# Patient Record
Sex: FEMALE | Race: WHITE | NOT HISPANIC OR LATINO | Employment: OTHER | URBAN - METROPOLITAN AREA
[De-identification: names, ages, dates, MRNs, and addresses within clinical notes are randomized per-mention and may not be internally consistent; named-entity substitution may affect disease eponyms.]

---

## 2019-05-02 ENCOUNTER — TRANSCRIBE ORDERS (OUTPATIENT)
Dept: MRI IMAGING | Facility: CLINIC | Age: 64
End: 2019-05-02

## 2019-05-02 ENCOUNTER — APPOINTMENT (OUTPATIENT)
Dept: RADIOLOGY | Facility: CLINIC | Age: 64
End: 2019-05-02
Payer: COMMERCIAL

## 2019-05-02 DIAGNOSIS — M54.2 NECKACHE: ICD-10-CM

## 2019-05-02 DIAGNOSIS — M54.2 NECKACHE: Primary | ICD-10-CM

## 2019-05-02 PROCEDURE — 72050 X-RAY EXAM NECK SPINE 4/5VWS: CPT

## 2019-07-02 ENCOUNTER — APPOINTMENT (OUTPATIENT)
Dept: LAB | Facility: HOSPITAL | Age: 64
End: 2019-07-02
Payer: COMMERCIAL

## 2019-07-02 ENCOUNTER — TRANSCRIBE ORDERS (OUTPATIENT)
Dept: ADMINISTRATIVE | Facility: HOSPITAL | Age: 64
End: 2019-07-02

## 2019-07-02 DIAGNOSIS — I10 ESSENTIAL HYPERTENSION, MALIGNANT: Primary | ICD-10-CM

## 2019-07-02 DIAGNOSIS — I10 ESSENTIAL HYPERTENSION, MALIGNANT: ICD-10-CM

## 2019-07-02 LAB
25(OH)D3 SERPL-MCNC: 18 NG/ML (ref 30–100)
ALBUMIN SERPL BCP-MCNC: 3.5 G/DL (ref 3.5–5)
ALP SERPL-CCNC: 96 U/L (ref 46–116)
ALT SERPL W P-5'-P-CCNC: 26 U/L (ref 12–78)
ANION GAP SERPL CALCULATED.3IONS-SCNC: 8 MMOL/L (ref 4–13)
AST SERPL W P-5'-P-CCNC: 14 U/L (ref 5–45)
BASOPHILS # BLD AUTO: 0.09 THOUSANDS/ΜL (ref 0–0.1)
BASOPHILS NFR BLD AUTO: 1 % (ref 0–1)
BILIRUB DIRECT SERPL-MCNC: 0.07 MG/DL (ref 0–0.2)
BILIRUB SERPL-MCNC: 0.3 MG/DL (ref 0.2–1)
BUN SERPL-MCNC: 13 MG/DL (ref 5–25)
CALCIUM SERPL-MCNC: 8.6 MG/DL (ref 8.3–10.1)
CHLORIDE SERPL-SCNC: 106 MMOL/L (ref 100–108)
CHOLEST SERPL-MCNC: 152 MG/DL (ref 50–200)
CO2 SERPL-SCNC: 28 MMOL/L (ref 21–32)
CREAT SERPL-MCNC: 0.72 MG/DL (ref 0.6–1.3)
EOSINOPHIL # BLD AUTO: 0.5 THOUSAND/ΜL (ref 0–0.61)
EOSINOPHIL NFR BLD AUTO: 5 % (ref 0–6)
ERYTHROCYTE [DISTWIDTH] IN BLOOD BY AUTOMATED COUNT: 12.6 % (ref 11.6–15.1)
EST. AVERAGE GLUCOSE BLD GHB EST-MCNC: 128 MG/DL
GFR SERPL CREATININE-BSD FRML MDRD: 89 ML/MIN/1.73SQ M
GLUCOSE SERPL-MCNC: 101 MG/DL (ref 65–140)
HBA1C MFR BLD: 6.1 % (ref 4.2–6.3)
HCT VFR BLD AUTO: 39.7 % (ref 34.8–46.1)
HDLC SERPL-MCNC: 36 MG/DL (ref 40–60)
HGB BLD-MCNC: 13.5 G/DL (ref 11.5–15.4)
IMM GRANULOCYTES # BLD AUTO: 0.12 THOUSAND/UL (ref 0–0.2)
IMM GRANULOCYTES NFR BLD AUTO: 1 % (ref 0–2)
LDLC SERPL CALC-MCNC: 55 MG/DL (ref 0–100)
LYMPHOCYTES # BLD AUTO: 2.84 THOUSANDS/ΜL (ref 0.6–4.47)
LYMPHOCYTES NFR BLD AUTO: 26 % (ref 14–44)
MCH RBC QN AUTO: 30.3 PG (ref 26.8–34.3)
MCHC RBC AUTO-ENTMCNC: 34 G/DL (ref 31.4–37.4)
MCV RBC AUTO: 89 FL (ref 82–98)
MONOCYTES # BLD AUTO: 0.88 THOUSAND/ΜL (ref 0.17–1.22)
MONOCYTES NFR BLD AUTO: 8 % (ref 4–12)
NEUTROPHILS # BLD AUTO: 6.58 THOUSANDS/ΜL (ref 1.85–7.62)
NEUTS SEG NFR BLD AUTO: 59 % (ref 43–75)
NONHDLC SERPL-MCNC: 116 MG/DL
NRBC BLD AUTO-RTO: 0 /100 WBCS
PLATELET # BLD AUTO: 218 THOUSANDS/UL (ref 149–390)
PMV BLD AUTO: 10.9 FL (ref 8.9–12.7)
POTASSIUM SERPL-SCNC: 4.2 MMOL/L (ref 3.5–5.3)
PROT SERPL-MCNC: 6.9 G/DL (ref 6.4–8.2)
RBC # BLD AUTO: 4.45 MILLION/UL (ref 3.81–5.12)
SODIUM SERPL-SCNC: 142 MMOL/L (ref 136–145)
TRIGL SERPL-MCNC: 306 MG/DL
WBC # BLD AUTO: 11.01 THOUSAND/UL (ref 4.31–10.16)

## 2019-07-02 PROCEDURE — 80061 LIPID PANEL: CPT

## 2019-07-02 PROCEDURE — 80048 BASIC METABOLIC PNL TOTAL CA: CPT

## 2019-07-02 PROCEDURE — 82306 VITAMIN D 25 HYDROXY: CPT

## 2019-07-02 PROCEDURE — 83036 HEMOGLOBIN GLYCOSYLATED A1C: CPT

## 2019-07-02 PROCEDURE — 80076 HEPATIC FUNCTION PANEL: CPT

## 2019-07-02 PROCEDURE — 85025 COMPLETE CBC W/AUTO DIFF WBC: CPT

## 2019-07-02 PROCEDURE — 36415 COLL VENOUS BLD VENIPUNCTURE: CPT

## 2019-10-09 ENCOUNTER — APPOINTMENT (OUTPATIENT)
Dept: LAB | Facility: CLINIC | Age: 64
End: 2019-10-09
Payer: COMMERCIAL

## 2019-10-09 ENCOUNTER — TRANSCRIBE ORDERS (OUTPATIENT)
Dept: LAB | Facility: CLINIC | Age: 64
End: 2019-10-09

## 2019-10-09 DIAGNOSIS — M79.642 LEFT HAND PAIN: Primary | ICD-10-CM

## 2019-10-09 DIAGNOSIS — M79.642 LEFT HAND PAIN: ICD-10-CM

## 2019-10-09 LAB
CRP SERPL QL: 10.2 MG/L
ERYTHROCYTE [DISTWIDTH] IN BLOOD BY AUTOMATED COUNT: 13.1 % (ref 11.6–15.1)
ERYTHROCYTE [SEDIMENTATION RATE] IN BLOOD: 38 MM/HOUR (ref 0–20)
HCT VFR BLD AUTO: 40 % (ref 34.8–46.1)
HGB BLD-MCNC: 13.1 G/DL (ref 11.5–15.4)
MCH RBC QN AUTO: 29.4 PG (ref 26.8–34.3)
MCHC RBC AUTO-ENTMCNC: 32.8 G/DL (ref 31.4–37.4)
MCV RBC AUTO: 90 FL (ref 82–98)
PLATELET # BLD AUTO: 228 THOUSANDS/UL (ref 149–390)
PMV BLD AUTO: 11 FL (ref 8.9–12.7)
RBC # BLD AUTO: 4.45 MILLION/UL (ref 3.81–5.12)
URATE SERPL-MCNC: 5.3 MG/DL (ref 2–6.8)
WBC # BLD AUTO: 10.69 THOUSAND/UL (ref 4.31–10.16)

## 2019-10-09 PROCEDURE — 86038 ANTINUCLEAR ANTIBODIES: CPT

## 2019-10-09 PROCEDURE — 86140 C-REACTIVE PROTEIN: CPT

## 2019-10-09 PROCEDURE — 36415 COLL VENOUS BLD VENIPUNCTURE: CPT | Performed by: ORTHOPAEDIC SURGERY

## 2019-10-09 PROCEDURE — 85652 RBC SED RATE AUTOMATED: CPT | Performed by: ORTHOPAEDIC SURGERY

## 2019-10-09 PROCEDURE — 84550 ASSAY OF BLOOD/URIC ACID: CPT

## 2019-10-09 PROCEDURE — 86618 LYME DISEASE ANTIBODY: CPT

## 2019-10-09 PROCEDURE — 85027 COMPLETE CBC AUTOMATED: CPT

## 2019-10-09 PROCEDURE — 86430 RHEUMATOID FACTOR TEST QUAL: CPT

## 2019-10-10 LAB
B BURGDOR IGG+IGM SER-ACNC: <0.91 ISR (ref 0–0.9)
RHEUMATOID FACT SER QL LA: NEGATIVE

## 2019-10-11 LAB — RYE IGE QN: NEGATIVE

## 2020-09-28 ENCOUNTER — TRANSCRIBE ORDERS (OUTPATIENT)
Dept: LAB | Facility: CLINIC | Age: 65
End: 2020-09-28

## 2020-09-28 ENCOUNTER — APPOINTMENT (OUTPATIENT)
Dept: LAB | Facility: CLINIC | Age: 65
End: 2020-09-28
Payer: MEDICARE

## 2020-09-28 DIAGNOSIS — R73.03 DIABETES MELLITUS, LATENT: ICD-10-CM

## 2020-09-28 DIAGNOSIS — Z83.430 FAMILY HISTORY OF ELEVATED LIPOPROTEIN (A): ICD-10-CM

## 2020-09-28 DIAGNOSIS — R73.03 DIABETES MELLITUS, LATENT: Primary | ICD-10-CM

## 2020-09-28 LAB
ALBUMIN SERPL BCP-MCNC: 3.5 G/DL (ref 3.5–5)
ALP SERPL-CCNC: 83 U/L (ref 46–116)
ALT SERPL W P-5'-P-CCNC: 24 U/L (ref 12–78)
ANION GAP SERPL CALCULATED.3IONS-SCNC: 7 MMOL/L (ref 4–13)
AST SERPL W P-5'-P-CCNC: 13 U/L (ref 5–45)
BASOPHILS # BLD AUTO: 0.15 THOUSANDS/ΜL (ref 0–0.1)
BASOPHILS NFR BLD AUTO: 1 % (ref 0–1)
BILIRUB SERPL-MCNC: 0.42 MG/DL (ref 0.2–1)
BUN SERPL-MCNC: 17 MG/DL (ref 5–25)
CALCIUM SERPL-MCNC: 9.1 MG/DL (ref 8.3–10.1)
CHLORIDE SERPL-SCNC: 109 MMOL/L (ref 100–108)
CHOLEST SERPL-MCNC: 105 MG/DL (ref 50–200)
CO2 SERPL-SCNC: 28 MMOL/L (ref 21–32)
CREAT SERPL-MCNC: 0.71 MG/DL (ref 0.6–1.3)
CREAT UR-MCNC: 213 MG/DL
EOSINOPHIL # BLD AUTO: 1.42 THOUSAND/ΜL (ref 0–0.61)
EOSINOPHIL NFR BLD AUTO: 9 % (ref 0–6)
ERYTHROCYTE [DISTWIDTH] IN BLOOD BY AUTOMATED COUNT: 12.9 % (ref 11.6–15.1)
EST. AVERAGE GLUCOSE BLD GHB EST-MCNC: 128 MG/DL
GFR SERPL CREATININE-BSD FRML MDRD: 90 ML/MIN/1.73SQ M
GLUCOSE P FAST SERPL-MCNC: 104 MG/DL (ref 65–99)
HBA1C MFR BLD: 6.1 %
HCT VFR BLD AUTO: 39.5 % (ref 34.8–46.1)
HDLC SERPL-MCNC: 33 MG/DL
HGB BLD-MCNC: 13.4 G/DL (ref 11.5–15.4)
IMM GRANULOCYTES # BLD AUTO: 0.19 THOUSAND/UL (ref 0–0.2)
IMM GRANULOCYTES NFR BLD AUTO: 1 % (ref 0–2)
LDLC SERPL CALC-MCNC: 25 MG/DL (ref 0–100)
LYMPHOCYTES # BLD AUTO: 3.56 THOUSANDS/ΜL (ref 0.6–4.47)
LYMPHOCYTES NFR BLD AUTO: 22 % (ref 14–44)
MCH RBC QN AUTO: 29.9 PG (ref 26.8–34.3)
MCHC RBC AUTO-ENTMCNC: 33.9 G/DL (ref 31.4–37.4)
MCV RBC AUTO: 88 FL (ref 82–98)
MICROALBUMIN UR-MCNC: 37.8 MG/L (ref 0–20)
MICROALBUMIN/CREAT 24H UR: 18 MG/G CREATININE (ref 0–30)
MONOCYTES # BLD AUTO: 1.09 THOUSAND/ΜL (ref 0.17–1.22)
MONOCYTES NFR BLD AUTO: 7 % (ref 4–12)
NEUTROPHILS # BLD AUTO: 9.56 THOUSANDS/ΜL (ref 1.85–7.62)
NEUTS SEG NFR BLD AUTO: 60 % (ref 43–75)
NONHDLC SERPL-MCNC: 72 MG/DL
NRBC BLD AUTO-RTO: 0 /100 WBCS
PLATELET # BLD AUTO: 257 THOUSANDS/UL (ref 149–390)
PMV BLD AUTO: 10.2 FL (ref 8.9–12.7)
POTASSIUM SERPL-SCNC: 2.9 MMOL/L (ref 3.5–5.3)
PROT SERPL-MCNC: 7.3 G/DL (ref 6.4–8.2)
RBC # BLD AUTO: 4.48 MILLION/UL (ref 3.81–5.12)
SODIUM SERPL-SCNC: 144 MMOL/L (ref 136–145)
TRIGL SERPL-MCNC: 236 MG/DL
WBC # BLD AUTO: 15.97 THOUSAND/UL (ref 4.31–10.16)

## 2020-09-28 PROCEDURE — 82043 UR ALBUMIN QUANTITATIVE: CPT | Performed by: NURSE PRACTITIONER

## 2020-09-28 PROCEDURE — 82570 ASSAY OF URINE CREATININE: CPT | Performed by: NURSE PRACTITIONER

## 2020-09-28 PROCEDURE — 80053 COMPREHEN METABOLIC PANEL: CPT

## 2020-09-28 PROCEDURE — 80061 LIPID PANEL: CPT

## 2020-09-28 PROCEDURE — 85025 COMPLETE CBC W/AUTO DIFF WBC: CPT

## 2020-09-28 PROCEDURE — 83036 HEMOGLOBIN GLYCOSYLATED A1C: CPT

## 2020-09-28 PROCEDURE — 36415 COLL VENOUS BLD VENIPUNCTURE: CPT

## 2020-10-06 ENCOUNTER — PREP FOR PROCEDURE (OUTPATIENT)
Dept: GASTROENTEROLOGY | Facility: CLINIC | Age: 65
End: 2020-10-06

## 2020-10-06 ENCOUNTER — OFFICE VISIT (OUTPATIENT)
Dept: GASTROENTEROLOGY | Facility: CLINIC | Age: 65
End: 2020-10-06
Payer: COMMERCIAL

## 2020-10-06 ENCOUNTER — TELEPHONE (OUTPATIENT)
Dept: GASTROENTEROLOGY | Facility: CLINIC | Age: 65
End: 2020-10-06

## 2020-10-06 VITALS
SYSTOLIC BLOOD PRESSURE: 146 MMHG | HEART RATE: 96 BPM | TEMPERATURE: 97.5 F | BODY MASS INDEX: 31.91 KG/M2 | DIASTOLIC BLOOD PRESSURE: 66 MMHG | WEIGHT: 169 LBS | HEIGHT: 61 IN

## 2020-10-06 DIAGNOSIS — R19.4 CHANGE IN BOWEL HABITS: ICD-10-CM

## 2020-10-06 DIAGNOSIS — R19.7 DIARRHEA, UNSPECIFIED TYPE: Primary | ICD-10-CM

## 2020-10-06 PROCEDURE — 99204 OFFICE O/P NEW MOD 45 MIN: CPT | Performed by: INTERNAL MEDICINE

## 2020-10-06 RX ORDER — POTASSIUM CHLORIDE 750 MG/1
10 TABLET, FILM COATED, EXTENDED RELEASE ORAL DAILY
COMMUNITY
Start: 2020-10-05 | End: 2020-10-09 | Stop reason: SDUPTHER

## 2020-10-06 RX ORDER — LOSARTAN POTASSIUM 50 MG/1
50 TABLET ORAL DAILY
COMMUNITY
Start: 2020-08-20 | End: 2020-11-13 | Stop reason: SDUPTHER

## 2020-10-06 RX ORDER — FUROSEMIDE 20 MG/1
20 TABLET ORAL DAILY
COMMUNITY
Start: 2020-08-20 | End: 2020-10-30 | Stop reason: SDUPTHER

## 2020-10-09 ENCOUNTER — LAB (OUTPATIENT)
Dept: LAB | Facility: CLINIC | Age: 65
End: 2020-10-09
Payer: COMMERCIAL

## 2020-10-09 ENCOUNTER — TELEPHONE (OUTPATIENT)
Dept: ADMINISTRATIVE | Facility: OTHER | Age: 65
End: 2020-10-09

## 2020-10-09 ENCOUNTER — OFFICE VISIT (OUTPATIENT)
Dept: INTERNAL MEDICINE CLINIC | Facility: CLINIC | Age: 65
End: 2020-10-09
Payer: COMMERCIAL

## 2020-10-09 VITALS
RESPIRATION RATE: 18 BRPM | TEMPERATURE: 97.8 F | DIASTOLIC BLOOD PRESSURE: 66 MMHG | HEART RATE: 84 BPM | WEIGHT: 168.4 LBS | SYSTOLIC BLOOD PRESSURE: 130 MMHG | HEIGHT: 60 IN | BODY MASS INDEX: 33.06 KG/M2

## 2020-10-09 DIAGNOSIS — F32.0 CURRENT MILD EPISODE OF MAJOR DEPRESSIVE DISORDER, UNSPECIFIED WHETHER RECURRENT (HCC): ICD-10-CM

## 2020-10-09 DIAGNOSIS — E66.9 OBESITY (BMI 30-39.9): ICD-10-CM

## 2020-10-09 DIAGNOSIS — E87.6 HYPOKALEMIA: ICD-10-CM

## 2020-10-09 DIAGNOSIS — Z23 ENCOUNTER FOR IMMUNIZATION: Primary | ICD-10-CM

## 2020-10-09 DIAGNOSIS — F17.200 SMOKING ADDICTION: ICD-10-CM

## 2020-10-09 DIAGNOSIS — E78.2 MIXED HYPERLIPIDEMIA: ICD-10-CM

## 2020-10-09 DIAGNOSIS — Z13.820 OSTEOPOROSIS SCREENING: ICD-10-CM

## 2020-10-09 DIAGNOSIS — H91.93 BILATERAL HEARING LOSS, UNSPECIFIED HEARING LOSS TYPE: ICD-10-CM

## 2020-10-09 DIAGNOSIS — L60.0 INGROWN TOENAIL OF BOTH FEET: ICD-10-CM

## 2020-10-09 DIAGNOSIS — M19.90 ARTHRITIS: ICD-10-CM

## 2020-10-09 DIAGNOSIS — E55.9 VITAMIN D DEFICIENCY: ICD-10-CM

## 2020-10-09 DIAGNOSIS — I10 ESSENTIAL HYPERTENSION: ICD-10-CM

## 2020-10-09 DIAGNOSIS — R73.03 PREDIABETES: ICD-10-CM

## 2020-10-09 DIAGNOSIS — Z53.20 CERVICAL CANCER SCREENING DECLINED: ICD-10-CM

## 2020-10-09 DIAGNOSIS — R19.7 DIARRHEA, UNSPECIFIED TYPE: ICD-10-CM

## 2020-10-09 PROCEDURE — 99204 OFFICE O/P NEW MOD 45 MIN: CPT | Performed by: NURSE PRACTITIONER

## 2020-10-09 PROCEDURE — 36415 COLL VENOUS BLD VENIPUNCTURE: CPT

## 2020-10-09 PROCEDURE — 4004F PT TOBACCO SCREEN RCVD TLK: CPT | Performed by: NURSE PRACTITIONER

## 2020-10-09 PROCEDURE — 83516 IMMUNOASSAY NONANTIBODY: CPT

## 2020-10-09 PROCEDURE — 3078F DIAST BP <80 MM HG: CPT | Performed by: NURSE PRACTITIONER

## 2020-10-09 PROCEDURE — 90662 IIV NO PRSV INCREASED AG IM: CPT | Performed by: NURSE PRACTITIONER

## 2020-10-09 PROCEDURE — G0008 ADMIN INFLUENZA VIRUS VAC: HCPCS | Performed by: NURSE PRACTITIONER

## 2020-10-09 PROCEDURE — 82784 ASSAY IGA/IGD/IGG/IGM EACH: CPT

## 2020-10-09 PROCEDURE — 86255 FLUORESCENT ANTIBODY SCREEN: CPT

## 2020-10-09 PROCEDURE — 3288F FALL RISK ASSESSMENT DOCD: CPT | Performed by: NURSE PRACTITIONER

## 2020-10-09 PROCEDURE — 1101F PT FALLS ASSESS-DOCD LE1/YR: CPT | Performed by: NURSE PRACTITIONER

## 2020-10-09 PROCEDURE — 3725F SCREEN DEPRESSION PERFORMED: CPT | Performed by: NURSE PRACTITIONER

## 2020-10-09 RX ORDER — LORATADINE 10 MG/1
CAPSULE, LIQUID FILLED ORAL AS NEEDED
COMMUNITY

## 2020-10-09 RX ORDER — ESCITALOPRAM OXALATE 20 MG/1
10 TABLET ORAL DAILY
Qty: 90 TABLET | Refills: 0 | Status: SHIPPED | OUTPATIENT
Start: 2020-10-09 | End: 2021-11-05 | Stop reason: ALTCHOICE

## 2020-10-09 RX ORDER — POTASSIUM CHLORIDE 1500 MG/1
20 TABLET, FILM COATED, EXTENDED RELEASE ORAL DAILY
Qty: 30 TABLET | Refills: 0 | Status: SHIPPED | OUTPATIENT
Start: 2020-10-09 | End: 2020-10-30 | Stop reason: SDUPTHER

## 2020-10-09 RX ORDER — NAPROXEN 500 MG/1
500 TABLET ORAL 2 TIMES DAILY WITH MEALS
Qty: 60 TABLET | Refills: 0 | Status: SHIPPED | OUTPATIENT
Start: 2020-10-09 | End: 2020-12-09

## 2020-10-09 RX ORDER — ATORVASTATIN CALCIUM 10 MG/1
TABLET, FILM COATED ORAL DAILY
COMMUNITY
Start: 2020-08-20 | End: 2020-10-30 | Stop reason: SDUPTHER

## 2020-10-09 RX ORDER — OMEPRAZOLE 20 MG/1
20 CAPSULE, DELAYED RELEASE ORAL DAILY
COMMUNITY
Start: 2020-09-14 | End: 2021-01-18 | Stop reason: SDUPTHER

## 2020-10-09 RX ORDER — FENOFIBRATE 160 MG/1
160 TABLET ORAL DAILY
COMMUNITY
Start: 2020-10-05 | End: 2020-11-13 | Stop reason: SDUPTHER

## 2020-10-10 LAB
ENDOMYSIUM IGA SER QL: NEGATIVE
GLIADIN PEPTIDE IGA SER-ACNC: 4 UNITS (ref 0–19)
GLIADIN PEPTIDE IGG SER-ACNC: 2 UNITS (ref 0–19)
IGA SERPL-MCNC: 222 MG/DL (ref 87–352)
TTG IGA SER-ACNC: <2 U/ML (ref 0–3)
TTG IGG SER-ACNC: <2 U/ML (ref 0–5)

## 2020-10-13 ENCOUNTER — TELEPHONE (OUTPATIENT)
Dept: INTERNAL MEDICINE CLINIC | Facility: CLINIC | Age: 65
End: 2020-10-13

## 2020-10-20 ENCOUNTER — LAB (OUTPATIENT)
Dept: LAB | Facility: HOSPITAL | Age: 65
End: 2020-10-20
Attending: INTERNAL MEDICINE
Payer: COMMERCIAL

## 2020-10-20 ENCOUNTER — TRANSCRIBE ORDERS (OUTPATIENT)
Dept: GASTROENTEROLOGY | Facility: CLINIC | Age: 65
End: 2020-10-20

## 2020-10-20 ENCOUNTER — TELEPHONE (OUTPATIENT)
Dept: GASTROENTEROLOGY | Facility: CLINIC | Age: 65
End: 2020-10-20

## 2020-10-20 ENCOUNTER — ANESTHESIA EVENT (OUTPATIENT)
Dept: GASTROENTEROLOGY | Facility: HOSPITAL | Age: 65
End: 2020-10-20

## 2020-10-20 DIAGNOSIS — R19.7 DIARRHEA, UNSPECIFIED TYPE: Primary | ICD-10-CM

## 2020-10-20 DIAGNOSIS — R19.4 CHANGE IN BOWEL HABITS: ICD-10-CM

## 2020-10-20 DIAGNOSIS — R19.7 DIARRHEA, UNSPECIFIED TYPE: ICD-10-CM

## 2020-10-20 LAB
25(OH)D3 SERPL-MCNC: 37.1 NG/ML (ref 30–100)
ANION GAP SERPL CALCULATED.3IONS-SCNC: 10 MMOL/L (ref 4–13)
BUN SERPL-MCNC: 12 MG/DL (ref 5–25)
CALCIUM SERPL-MCNC: 9 MG/DL (ref 8.3–10.1)
CHLORIDE SERPL-SCNC: 104 MMOL/L (ref 100–108)
CO2 SERPL-SCNC: 28 MMOL/L (ref 21–32)
CREAT SERPL-MCNC: 0.86 MG/DL (ref 0.6–1.3)
GFR SERPL CREATININE-BSD FRML MDRD: 71 ML/MIN/1.73SQ M
GLUCOSE P FAST SERPL-MCNC: 94 MG/DL (ref 65–99)
POTASSIUM SERPL-SCNC: 3.8 MMOL/L (ref 3.5–5.3)
SODIUM SERPL-SCNC: 142 MMOL/L (ref 136–145)

## 2020-10-20 PROCEDURE — 36415 COLL VENOUS BLD VENIPUNCTURE: CPT

## 2020-10-20 PROCEDURE — 80048 BASIC METABOLIC PNL TOTAL CA: CPT

## 2020-10-20 PROCEDURE — 82306 VITAMIN D 25 HYDROXY: CPT

## 2020-10-21 ENCOUNTER — TELEPHONE (OUTPATIENT)
Dept: HEMATOLOGY ONCOLOGY | Facility: CLINIC | Age: 65
End: 2020-10-21

## 2020-10-21 ENCOUNTER — HOSPITAL ENCOUNTER (OUTPATIENT)
Dept: GASTROENTEROLOGY | Facility: HOSPITAL | Age: 65
Setting detail: OUTPATIENT SURGERY
Discharge: HOME | End: 2020-10-21
Attending: INTERNAL MEDICINE
Payer: COMMERCIAL

## 2020-10-21 ENCOUNTER — ANESTHESIA (OUTPATIENT)
Dept: GASTROENTEROLOGY | Facility: HOSPITAL | Age: 65
End: 2020-10-21

## 2020-10-21 ENCOUNTER — TELEPHONE (OUTPATIENT)
Dept: GASTROENTEROLOGY | Facility: CLINIC | Age: 65
End: 2020-10-21

## 2020-10-21 ENCOUNTER — TRANSCRIBE ORDERS (OUTPATIENT)
Dept: GASTROENTEROLOGY | Facility: CLINIC | Age: 65
End: 2020-10-21

## 2020-10-21 VITALS
DIASTOLIC BLOOD PRESSURE: 70 MMHG | HEART RATE: 78 BPM | SYSTOLIC BLOOD PRESSURE: 164 MMHG | TEMPERATURE: 97.9 F | HEIGHT: 61 IN | RESPIRATION RATE: 21 BRPM | WEIGHT: 162.92 LBS | OXYGEN SATURATION: 99 % | BODY MASS INDEX: 30.76 KG/M2

## 2020-10-21 VITALS — HEART RATE: 80 BPM

## 2020-10-21 DIAGNOSIS — R19.4 CHANGE IN BOWEL HABITS: ICD-10-CM

## 2020-10-21 DIAGNOSIS — R19.7 DIARRHEA, UNSPECIFIED TYPE: ICD-10-CM

## 2020-10-21 DIAGNOSIS — K63.9 CECAL LESION: Primary | ICD-10-CM

## 2020-10-21 PROCEDURE — 88305 TISSUE EXAM BY PATHOLOGIST: CPT | Performed by: PATHOLOGY

## 2020-10-21 PROCEDURE — 45380 COLONOSCOPY AND BIOPSY: CPT | Performed by: INTERNAL MEDICINE

## 2020-10-21 PROCEDURE — 45381 COLONOSCOPY SUBMUCOUS NJX: CPT | Performed by: INTERNAL MEDICINE

## 2020-10-21 PROCEDURE — 45385 COLONOSCOPY W/LESION REMOVAL: CPT | Performed by: INTERNAL MEDICINE

## 2020-10-21 RX ORDER — SODIUM CHLORIDE, SODIUM LACTATE, POTASSIUM CHLORIDE, CALCIUM CHLORIDE 600; 310; 30; 20 MG/100ML; MG/100ML; MG/100ML; MG/100ML
125 INJECTION, SOLUTION INTRAVENOUS CONTINUOUS
Status: DISCONTINUED | OUTPATIENT
Start: 2020-10-21 | End: 2020-10-25 | Stop reason: HOSPADM

## 2020-10-21 RX ORDER — SODIUM CHLORIDE, SODIUM LACTATE, POTASSIUM CHLORIDE, CALCIUM CHLORIDE 600; 310; 30; 20 MG/100ML; MG/100ML; MG/100ML; MG/100ML
INJECTION, SOLUTION INTRAVENOUS CONTINUOUS PRN
Status: DISCONTINUED | OUTPATIENT
Start: 2020-10-21 | End: 2020-10-21

## 2020-10-21 RX ORDER — PROPOFOL 10 MG/ML
INJECTION, EMULSION INTRAVENOUS AS NEEDED
Status: DISCONTINUED | OUTPATIENT
Start: 2020-10-21 | End: 2020-10-21

## 2020-10-21 RX ORDER — LIDOCAINE HYDROCHLORIDE 10 MG/ML
0.5 INJECTION, SOLUTION EPIDURAL; INFILTRATION; INTRACAUDAL; PERINEURAL ONCE AS NEEDED
Status: DISCONTINUED | OUTPATIENT
Start: 2020-10-21 | End: 2020-10-25 | Stop reason: HOSPADM

## 2020-10-21 RX ORDER — PROPOFOL 10 MG/ML
INJECTION, EMULSION INTRAVENOUS CONTINUOUS PRN
Status: DISCONTINUED | OUTPATIENT
Start: 2020-10-21 | End: 2020-10-21

## 2020-10-21 RX ADMIN — PROPOFOL 100 MG: 10 INJECTION, EMULSION INTRAVENOUS at 12:33

## 2020-10-21 RX ADMIN — SODIUM CHLORIDE, SODIUM LACTATE, POTASSIUM CHLORIDE, AND CALCIUM CHLORIDE 125 ML/HR: .6; .31; .03; .02 INJECTION, SOLUTION INTRAVENOUS at 11:36

## 2020-10-21 RX ADMIN — SODIUM CHLORIDE, SODIUM LACTATE, POTASSIUM CHLORIDE, AND CALCIUM CHLORIDE: .6; .31; .03; .02 INJECTION, SOLUTION INTRAVENOUS at 12:30

## 2020-10-21 RX ADMIN — PROPOFOL 150 MCG/KG/MIN: 10 INJECTION, EMULSION INTRAVENOUS at 12:33

## 2020-10-22 ENCOUNTER — PATIENT OUTREACH (OUTPATIENT)
Dept: HEMATOLOGY ONCOLOGY | Facility: CLINIC | Age: 65
End: 2020-10-22

## 2020-10-23 ENCOUNTER — TELEPHONE (OUTPATIENT)
Dept: GASTROENTEROLOGY | Facility: CLINIC | Age: 65
End: 2020-10-23

## 2020-10-23 ENCOUNTER — TELEPHONE (OUTPATIENT)
Dept: INTERNAL MEDICINE CLINIC | Facility: CLINIC | Age: 65
End: 2020-10-23

## 2020-10-23 ENCOUNTER — DOCUMENTATION (OUTPATIENT)
Dept: HEMATOLOGY ONCOLOGY | Facility: CLINIC | Age: 65
End: 2020-10-23

## 2020-10-27 ENCOUNTER — TELEPHONE (OUTPATIENT)
Dept: GASTROENTEROLOGY | Facility: CLINIC | Age: 65
End: 2020-10-27

## 2020-10-27 ENCOUNTER — PATIENT OUTREACH (OUTPATIENT)
Dept: HEMATOLOGY ONCOLOGY | Facility: CLINIC | Age: 65
End: 2020-10-27

## 2020-10-28 ENCOUNTER — PATIENT OUTREACH (OUTPATIENT)
Dept: HEMATOLOGY ONCOLOGY | Facility: CLINIC | Age: 65
End: 2020-10-28

## 2020-10-28 ENCOUNTER — TELEPHONE (OUTPATIENT)
Dept: HEMATOLOGY ONCOLOGY | Facility: CLINIC | Age: 65
End: 2020-10-28

## 2020-10-30 DIAGNOSIS — E78.2 MIXED HYPERLIPIDEMIA: Primary | ICD-10-CM

## 2020-10-30 DIAGNOSIS — E87.6 HYPOKALEMIA: ICD-10-CM

## 2020-10-30 DIAGNOSIS — I10 ESSENTIAL HYPERTENSION: ICD-10-CM

## 2020-10-30 RX ORDER — FUROSEMIDE 20 MG/1
20 TABLET ORAL DAILY
Qty: 90 TABLET | Refills: 2 | Status: SHIPPED | OUTPATIENT
Start: 2020-10-30 | End: 2020-11-13 | Stop reason: SDUPTHER

## 2020-10-30 RX ORDER — POTASSIUM CHLORIDE 1500 MG/1
20 TABLET, FILM COATED, EXTENDED RELEASE ORAL DAILY
Qty: 30 TABLET | Refills: 0 | Status: SHIPPED | OUTPATIENT
Start: 2020-10-30 | End: 2020-11-13 | Stop reason: SDUPTHER

## 2020-10-30 RX ORDER — ATORVASTATIN CALCIUM 10 MG/1
10 TABLET, FILM COATED ORAL DAILY
Qty: 90 TABLET | Refills: 2 | Status: SHIPPED | OUTPATIENT
Start: 2020-10-30 | End: 2020-11-13 | Stop reason: SDUPTHER

## 2020-11-03 ENCOUNTER — TELEPHONE (OUTPATIENT)
Dept: GASTROENTEROLOGY | Facility: CLINIC | Age: 65
End: 2020-11-03

## 2020-11-03 ENCOUNTER — HOSPITAL ENCOUNTER (OUTPATIENT)
Dept: CT IMAGING | Facility: HOSPITAL | Age: 65
Discharge: HOME | End: 2020-11-03
Attending: INTERNAL MEDICINE
Payer: COMMERCIAL

## 2020-11-03 DIAGNOSIS — K63.9 CECAL LESION: ICD-10-CM

## 2020-11-03 PROCEDURE — 74177 CT ABD & PELVIS W/CONTRAST: CPT

## 2020-11-03 PROCEDURE — 71260 CT THORAX DX C+: CPT

## 2020-11-03 PROCEDURE — G1004 CDSM NDSC: HCPCS

## 2020-11-03 RX ADMIN — IOHEXOL 100 ML: 350 INJECTION, SOLUTION INTRAVENOUS at 07:38

## 2020-11-04 PROBLEM — D12.0 ADENOMA OF CECUM: Status: ACTIVE | Noted: 2020-11-04

## 2020-11-05 ENCOUNTER — PATIENT OUTREACH (OUTPATIENT)
Dept: HEMATOLOGY ONCOLOGY | Facility: CLINIC | Age: 65
End: 2020-11-05

## 2020-11-05 ENCOUNTER — TELEPHONE (OUTPATIENT)
Dept: SURGICAL ONCOLOGY | Facility: CLINIC | Age: 65
End: 2020-11-05

## 2020-11-05 ENCOUNTER — CONSULT (OUTPATIENT)
Dept: SURGICAL ONCOLOGY | Facility: CLINIC | Age: 65
End: 2020-11-05
Payer: COMMERCIAL

## 2020-11-05 VITALS
BODY MASS INDEX: 31.43 KG/M2 | WEIGHT: 166.5 LBS | HEIGHT: 61 IN | DIASTOLIC BLOOD PRESSURE: 70 MMHG | SYSTOLIC BLOOD PRESSURE: 122 MMHG | HEART RATE: 90 BPM | RESPIRATION RATE: 19 BRPM | TEMPERATURE: 98.2 F

## 2020-11-05 DIAGNOSIS — R91.1 LUNG NODULE SEEN ON IMAGING STUDY: ICD-10-CM

## 2020-11-05 DIAGNOSIS — D12.0 ADENOMA OF CECUM: Primary | ICD-10-CM

## 2020-11-05 DIAGNOSIS — K63.9 CECAL LESION: ICD-10-CM

## 2020-11-05 PROCEDURE — 99205 OFFICE O/P NEW HI 60 MIN: CPT | Performed by: SURGERY

## 2020-11-09 ENCOUNTER — TELEPHONE (OUTPATIENT)
Dept: GASTROENTEROLOGY | Facility: CLINIC | Age: 65
End: 2020-11-09

## 2020-11-09 DIAGNOSIS — R19.7 DIARRHEA, UNSPECIFIED TYPE: Primary | ICD-10-CM

## 2020-11-09 RX ORDER — CHOLESTYRAMINE 4 G/9G
1 POWDER, FOR SUSPENSION ORAL 3 TIMES DAILY PRN
Qty: 30 PACKET | Refills: 5 | Status: SHIPPED | OUTPATIENT
Start: 2020-11-09 | End: 2020-11-19 | Stop reason: HOSPADM

## 2020-11-10 DIAGNOSIS — R19.7 DIARRHEA, UNSPECIFIED TYPE: Primary | ICD-10-CM

## 2020-11-10 RX ORDER — DIPHENOXYLATE HYDROCHLORIDE AND ATROPINE SULFATE 2.5; .025 MG/1; MG/1
2 TABLET ORAL 2 TIMES DAILY PRN
Qty: 30 TABLET | Refills: 3 | Status: SHIPPED | OUTPATIENT
Start: 2020-11-10 | End: 2020-12-04

## 2020-11-11 ENCOUNTER — PATIENT OUTREACH (OUTPATIENT)
Dept: HEMATOLOGY ONCOLOGY | Facility: CLINIC | Age: 65
End: 2020-11-11

## 2020-11-13 ENCOUNTER — OFFICE VISIT (OUTPATIENT)
Dept: INTERNAL MEDICINE CLINIC | Facility: CLINIC | Age: 65
End: 2020-11-13
Payer: COMMERCIAL

## 2020-11-13 VITALS
DIASTOLIC BLOOD PRESSURE: 70 MMHG | HEART RATE: 97 BPM | WEIGHT: 165 LBS | OXYGEN SATURATION: 97 % | HEIGHT: 61 IN | BODY MASS INDEX: 31.15 KG/M2 | SYSTOLIC BLOOD PRESSURE: 148 MMHG | TEMPERATURE: 97.6 F

## 2020-11-13 DIAGNOSIS — F17.200 SMOKING ADDICTION: ICD-10-CM

## 2020-11-13 DIAGNOSIS — I10 ESSENTIAL HYPERTENSION: Primary | ICD-10-CM

## 2020-11-13 DIAGNOSIS — E78.2 MIXED HYPERLIPIDEMIA: ICD-10-CM

## 2020-11-13 DIAGNOSIS — R73.03 PREDIABETES: ICD-10-CM

## 2020-11-13 DIAGNOSIS — R91.1 NODULE OF LOWER LOBE OF RIGHT LUNG: ICD-10-CM

## 2020-11-13 DIAGNOSIS — E87.6 HYPOKALEMIA: ICD-10-CM

## 2020-11-13 PROCEDURE — 99203 OFFICE O/P NEW LOW 30 MIN: CPT | Performed by: INTERNAL MEDICINE

## 2020-11-13 PROCEDURE — 3725F SCREEN DEPRESSION PERFORMED: CPT | Performed by: INTERNAL MEDICINE

## 2020-11-13 RX ORDER — ATORVASTATIN CALCIUM 10 MG/1
10 TABLET, FILM COATED ORAL DAILY
Qty: 90 TABLET | Refills: 2 | Status: SHIPPED | OUTPATIENT
Start: 2020-11-13 | End: 2021-01-18 | Stop reason: SDUPTHER

## 2020-11-13 RX ORDER — LOSARTAN POTASSIUM 50 MG/1
50 TABLET ORAL DAILY
Qty: 90 TABLET | Refills: 3 | Status: SHIPPED | OUTPATIENT
Start: 2020-11-13 | End: 2020-12-09

## 2020-11-13 RX ORDER — FENOFIBRATE 160 MG/1
160 TABLET ORAL DAILY
Qty: 90 TABLET | Refills: 3 | Status: SHIPPED | OUTPATIENT
Start: 2020-11-13 | End: 2021-06-21 | Stop reason: SDUPTHER

## 2020-11-13 RX ORDER — FUROSEMIDE 20 MG/1
20 TABLET ORAL DAILY
Qty: 90 TABLET | Refills: 2 | Status: SHIPPED | OUTPATIENT
Start: 2020-11-13 | End: 2021-01-18 | Stop reason: SDUPTHER

## 2020-11-13 RX ORDER — POTASSIUM CHLORIDE 1500 MG/1
20 TABLET, FILM COATED, EXTENDED RELEASE ORAL DAILY
Qty: 90 TABLET | Refills: 3 | Status: SHIPPED | OUTPATIENT
Start: 2020-11-13 | End: 2021-06-21 | Stop reason: ALTCHOICE

## 2020-11-16 ENCOUNTER — HOSPITAL ENCOUNTER (OUTPATIENT)
Dept: RADIOLOGY | Age: 65
Discharge: HOME | End: 2020-11-16
Payer: COMMERCIAL

## 2020-11-16 ENCOUNTER — TELEPHONE (OUTPATIENT)
Dept: CARDIAC SURGERY | Facility: CLINIC | Age: 65
End: 2020-11-16

## 2020-11-16 DIAGNOSIS — R91.1 LUNG NODULE SEEN ON IMAGING STUDY: ICD-10-CM

## 2020-11-16 LAB — GLUCOSE SERPL-MCNC: 81 MG/DL (ref 65–140)

## 2020-11-16 PROCEDURE — G1004 CDSM NDSC: HCPCS

## 2020-11-16 PROCEDURE — 78815 PET IMAGE W/CT SKULL-THIGH: CPT

## 2020-11-16 PROCEDURE — A9552 F18 FDG: HCPCS

## 2020-11-16 PROCEDURE — 82948 REAGENT STRIP/BLOOD GLUCOSE: CPT

## 2020-11-17 ENCOUNTER — OFFICE VISIT (OUTPATIENT)
Dept: CARDIAC SURGERY | Facility: CLINIC | Age: 65
End: 2020-11-17
Payer: COMMERCIAL

## 2020-11-17 VITALS
HEART RATE: 105 BPM | OXYGEN SATURATION: 95 % | BODY MASS INDEX: 31.15 KG/M2 | DIASTOLIC BLOOD PRESSURE: 62 MMHG | WEIGHT: 165 LBS | TEMPERATURE: 97.5 F | SYSTOLIC BLOOD PRESSURE: 138 MMHG | HEIGHT: 61 IN

## 2020-11-17 DIAGNOSIS — R91.1 LUNG NODULE SEEN ON IMAGING STUDY: ICD-10-CM

## 2020-11-17 DIAGNOSIS — R91.1 PULMONARY NODULE: Primary | ICD-10-CM

## 2020-11-17 DIAGNOSIS — R91.1 NODULE OF LOWER LOBE OF RIGHT LUNG: ICD-10-CM

## 2020-11-17 DIAGNOSIS — R19.7 DIARRHEA, UNSPECIFIED TYPE: Primary | ICD-10-CM

## 2020-11-17 PROCEDURE — 3075F SYST BP GE 130 - 139MM HG: CPT | Performed by: THORACIC SURGERY (CARDIOTHORACIC VASCULAR SURGERY)

## 2020-11-17 PROCEDURE — 3078F DIAST BP <80 MM HG: CPT | Performed by: THORACIC SURGERY (CARDIOTHORACIC VASCULAR SURGERY)

## 2020-11-17 PROCEDURE — 99205 OFFICE O/P NEW HI 60 MIN: CPT | Performed by: THORACIC SURGERY (CARDIOTHORACIC VASCULAR SURGERY)

## 2020-11-17 RX ORDER — MONTELUKAST SODIUM 4 MG/1
2 TABLET, CHEWABLE ORAL 2 TIMES DAILY
Qty: 60 TABLET | Refills: 5 | Status: SHIPPED | OUTPATIENT
Start: 2020-11-17 | End: 2021-02-26

## 2020-11-18 ENCOUNTER — TELEPHONE (OUTPATIENT)
Dept: GASTROENTEROLOGY | Facility: CLINIC | Age: 65
End: 2020-11-18

## 2020-11-18 ENCOUNTER — TELEPHONE (OUTPATIENT)
Dept: SURGICAL ONCOLOGY | Facility: CLINIC | Age: 65
End: 2020-11-18

## 2020-11-19 ENCOUNTER — TELEPHONE (OUTPATIENT)
Dept: HEMATOLOGY ONCOLOGY | Facility: CLINIC | Age: 65
End: 2020-11-19

## 2020-11-19 ENCOUNTER — APPOINTMENT (OUTPATIENT)
Dept: LAB | Facility: HOSPITAL | Age: 65
End: 2020-11-19
Payer: COMMERCIAL

## 2020-11-19 ENCOUNTER — TRANSCRIBE ORDERS (OUTPATIENT)
Dept: ADMINISTRATIVE | Facility: HOSPITAL | Age: 65
End: 2020-11-19

## 2020-11-19 ENCOUNTER — LAB (OUTPATIENT)
Dept: LAB | Facility: HOSPITAL | Age: 65
End: 2020-11-19
Attending: SURGERY
Payer: COMMERCIAL

## 2020-11-19 ENCOUNTER — OFFICE VISIT (OUTPATIENT)
Dept: SURGICAL ONCOLOGY | Facility: CLINIC | Age: 65
End: 2020-11-19
Payer: COMMERCIAL

## 2020-11-19 VITALS
TEMPERATURE: 98.7 F | HEART RATE: 87 BPM | DIASTOLIC BLOOD PRESSURE: 80 MMHG | RESPIRATION RATE: 16 BRPM | HEIGHT: 61 IN | BODY MASS INDEX: 31.15 KG/M2 | SYSTOLIC BLOOD PRESSURE: 144 MMHG | WEIGHT: 165 LBS

## 2020-11-19 DIAGNOSIS — D12.0 BENIGN NEOPLASM OF CECUM: Primary | ICD-10-CM

## 2020-11-19 DIAGNOSIS — D12.0 ADENOMA OF CECUM: Primary | ICD-10-CM

## 2020-11-19 DIAGNOSIS — D12.0 BENIGN NEOPLASM OF CECUM: ICD-10-CM

## 2020-11-19 DIAGNOSIS — D12.0 ADENOMA OF CECUM: ICD-10-CM

## 2020-11-19 LAB
ABO GROUP BLD: NORMAL
ALBUMIN SERPL BCP-MCNC: 3.8 G/DL (ref 3.5–5)
ALP SERPL-CCNC: 75 U/L (ref 46–116)
ALT SERPL W P-5'-P-CCNC: 23 U/L (ref 12–78)
ANION GAP SERPL CALCULATED.3IONS-SCNC: 10 MMOL/L (ref 4–13)
APTT PPP: 29 SECONDS (ref 23–37)
AST SERPL W P-5'-P-CCNC: 19 U/L (ref 5–45)
BASOPHILS # BLD AUTO: 0.06 THOUSANDS/ΜL (ref 0–0.1)
BASOPHILS NFR BLD AUTO: 1 % (ref 0–1)
BILIRUB SERPL-MCNC: 0.3 MG/DL (ref 0.2–1)
BLD GP AB SCN SERPL QL: NEGATIVE
BUN SERPL-MCNC: 23 MG/DL (ref 5–25)
CALCIUM SERPL-MCNC: 9.7 MG/DL (ref 8.3–10.1)
CHLORIDE SERPL-SCNC: 108 MMOL/L (ref 100–108)
CO2 SERPL-SCNC: 23 MMOL/L (ref 21–32)
CREAT SERPL-MCNC: 0.94 MG/DL (ref 0.6–1.3)
EOSINOPHIL # BLD AUTO: 0.23 THOUSAND/ΜL (ref 0–0.61)
EOSINOPHIL NFR BLD AUTO: 2 % (ref 0–6)
ERYTHROCYTE [DISTWIDTH] IN BLOOD BY AUTOMATED COUNT: 12.8 % (ref 11.6–15.1)
GFR SERPL CREATININE-BSD FRML MDRD: 64 ML/MIN/1.73SQ M
GLUCOSE P FAST SERPL-MCNC: 101 MG/DL (ref 65–99)
HCT VFR BLD AUTO: 38.7 % (ref 34.8–46.1)
HGB BLD-MCNC: 13.4 G/DL (ref 11.5–15.4)
IMM GRANULOCYTES # BLD AUTO: 0.08 THOUSAND/UL (ref 0–0.2)
IMM GRANULOCYTES NFR BLD AUTO: 1 % (ref 0–2)
INR PPP: 0.97 (ref 0.84–1.19)
LYMPHOCYTES # BLD AUTO: 2.81 THOUSANDS/ΜL (ref 0.6–4.47)
LYMPHOCYTES NFR BLD AUTO: 23 % (ref 14–44)
MCH RBC QN AUTO: 30.2 PG (ref 26.8–34.3)
MCHC RBC AUTO-ENTMCNC: 34.6 G/DL (ref 31.4–37.4)
MCV RBC AUTO: 87 FL (ref 82–98)
MONOCYTES # BLD AUTO: 1.07 THOUSAND/ΜL (ref 0.17–1.22)
MONOCYTES NFR BLD AUTO: 9 % (ref 4–12)
NEUTROPHILS # BLD AUTO: 8.03 THOUSANDS/ΜL (ref 1.85–7.62)
NEUTS SEG NFR BLD AUTO: 64 % (ref 43–75)
NRBC BLD AUTO-RTO: 0 /100 WBCS
PLATELET # BLD AUTO: 308 THOUSANDS/UL (ref 149–390)
PMV BLD AUTO: 9.8 FL (ref 8.9–12.7)
POTASSIUM SERPL-SCNC: 3.5 MMOL/L (ref 3.5–5.3)
PROT SERPL-MCNC: 7.5 G/DL (ref 6.4–8.2)
PROTHROMBIN TIME: 12.4 SECONDS (ref 11.6–14.5)
RBC # BLD AUTO: 4.44 MILLION/UL (ref 3.81–5.12)
RH BLD: POSITIVE
SODIUM SERPL-SCNC: 141 MMOL/L (ref 136–145)
SPECIMEN EXPIRATION DATE: NORMAL
WBC # BLD AUTO: 12.28 THOUSAND/UL (ref 4.31–10.16)

## 2020-11-19 PROCEDURE — 3008F BODY MASS INDEX DOCD: CPT | Performed by: SURGERY

## 2020-11-19 PROCEDURE — 85025 COMPLETE CBC W/AUTO DIFF WBC: CPT

## 2020-11-19 PROCEDURE — 85610 PROTHROMBIN TIME: CPT

## 2020-11-19 PROCEDURE — 99214 OFFICE O/P EST MOD 30 MIN: CPT | Performed by: SURGERY

## 2020-11-19 PROCEDURE — 86850 RBC ANTIBODY SCREEN: CPT

## 2020-11-19 PROCEDURE — 85730 THROMBOPLASTIN TIME PARTIAL: CPT

## 2020-11-19 PROCEDURE — 86900 BLOOD TYPING SEROLOGIC ABO: CPT

## 2020-11-19 PROCEDURE — 80053 COMPREHEN METABOLIC PANEL: CPT

## 2020-11-19 PROCEDURE — 36415 COLL VENOUS BLD VENIPUNCTURE: CPT

## 2020-11-19 PROCEDURE — 4004F PT TOBACCO SCREEN RCVD TLK: CPT | Performed by: SURGERY

## 2020-11-19 PROCEDURE — 86901 BLOOD TYPING SEROLOGIC RH(D): CPT

## 2020-11-20 ENCOUNTER — TELEPHONE (OUTPATIENT)
Dept: SURGICAL ONCOLOGY | Facility: CLINIC | Age: 65
End: 2020-11-20

## 2020-12-07 DIAGNOSIS — D12.0 ADENOMA OF CECUM: ICD-10-CM

## 2020-12-07 PROCEDURE — U0003 INFECTIOUS AGENT DETECTION BY NUCLEIC ACID (DNA OR RNA); SEVERE ACUTE RESPIRATORY SYNDROME CORONAVIRUS 2 (SARS-COV-2) (CORONAVIRUS DISEASE [COVID-19]), AMPLIFIED PROBE TECHNIQUE, MAKING USE OF HIGH THROUGHPUT TECHNOLOGIES AS DESCRIBED BY CMS-2020-01-R: HCPCS | Performed by: SURGERY

## 2020-12-08 LAB — SARS-COV-2 RNA SPEC QL NAA+PROBE: NOT DETECTED

## 2020-12-09 ENCOUNTER — CONSULT (OUTPATIENT)
Dept: INTERNAL MEDICINE CLINIC | Facility: CLINIC | Age: 65
End: 2020-12-09
Payer: COMMERCIAL

## 2020-12-09 ENCOUNTER — TELEPHONE (OUTPATIENT)
Dept: HEMATOLOGY ONCOLOGY | Facility: CLINIC | Age: 65
End: 2020-12-09

## 2020-12-09 VITALS
BODY MASS INDEX: 31.42 KG/M2 | DIASTOLIC BLOOD PRESSURE: 70 MMHG | HEART RATE: 92 BPM | TEMPERATURE: 98.3 F | SYSTOLIC BLOOD PRESSURE: 124 MMHG | OXYGEN SATURATION: 97 % | WEIGHT: 166.4 LBS | HEIGHT: 61 IN

## 2020-12-09 DIAGNOSIS — F17.200 SMOKING ADDICTION: ICD-10-CM

## 2020-12-09 DIAGNOSIS — E78.2 MIXED HYPERLIPIDEMIA: ICD-10-CM

## 2020-12-09 DIAGNOSIS — I10 ESSENTIAL HYPERTENSION: ICD-10-CM

## 2020-12-09 DIAGNOSIS — Z01.818 PREOP EXAMINATION: ICD-10-CM

## 2020-12-09 DIAGNOSIS — E66.9 OBESITY (BMI 30-39.9): ICD-10-CM

## 2020-12-09 DIAGNOSIS — F32.0 CURRENT MILD EPISODE OF MAJOR DEPRESSIVE DISORDER, UNSPECIFIED WHETHER RECURRENT (HCC): ICD-10-CM

## 2020-12-09 DIAGNOSIS — D12.0 ADENOMA OF CECUM: Primary | ICD-10-CM

## 2020-12-09 PROCEDURE — 3008F BODY MASS INDEX DOCD: CPT | Performed by: PHYSICIAN ASSISTANT

## 2020-12-09 PROCEDURE — 4004F PT TOBACCO SCREEN RCVD TLK: CPT | Performed by: PHYSICIAN ASSISTANT

## 2020-12-09 PROCEDURE — 3078F DIAST BP <80 MM HG: CPT | Performed by: PHYSICIAN ASSISTANT

## 2020-12-09 PROCEDURE — 99214 OFFICE O/P EST MOD 30 MIN: CPT | Performed by: PHYSICIAN ASSISTANT

## 2020-12-09 PROCEDURE — 3074F SYST BP LT 130 MM HG: CPT | Performed by: PHYSICIAN ASSISTANT

## 2020-12-09 PROCEDURE — G0403 EKG FOR INITIAL PREVENT EXAM: HCPCS | Performed by: PHYSICIAN ASSISTANT

## 2020-12-09 PROCEDURE — G0402 INITIAL PREVENTIVE EXAM: HCPCS | Performed by: PHYSICIAN ASSISTANT

## 2020-12-14 ENCOUNTER — ANESTHESIA EVENT (OUTPATIENT)
Dept: PERIOP | Facility: HOSPITAL | Age: 65
DRG: 330 | End: 2020-12-14
Payer: COMMERCIAL

## 2020-12-14 ENCOUNTER — ANESTHESIA (OUTPATIENT)
Dept: PERIOP | Facility: HOSPITAL | Age: 65
DRG: 330 | End: 2020-12-14
Payer: COMMERCIAL

## 2020-12-14 ENCOUNTER — HOSPITAL ENCOUNTER (INPATIENT)
Facility: HOSPITAL | Age: 65
LOS: 4 days | Discharge: HOME | DRG: 330 | End: 2020-12-18
Attending: SURGERY | Admitting: SURGERY
Payer: COMMERCIAL

## 2020-12-14 VITALS — HEART RATE: 86 BPM

## 2020-12-14 DIAGNOSIS — N18.30 STAGE 3 CHRONIC KIDNEY DISEASE, UNSPECIFIED WHETHER STAGE 3A OR 3B CKD (HCC): Primary | ICD-10-CM

## 2020-12-14 DIAGNOSIS — D12.0 ADENOMA OF CECUM: ICD-10-CM

## 2020-12-14 LAB
ABO GROUP BLD: NORMAL
ANION GAP SERPL CALCULATED.3IONS-SCNC: 7 MMOL/L (ref 4–13)
BUN SERPL-MCNC: 11 MG/DL (ref 5–25)
CALCIUM SERPL-MCNC: 8.5 MG/DL (ref 8.3–10.1)
CHLORIDE SERPL-SCNC: 111 MMOL/L (ref 100–108)
CO2 SERPL-SCNC: 23 MMOL/L (ref 21–32)
CREAT SERPL-MCNC: 0.68 MG/DL (ref 0.6–1.3)
ERYTHROCYTE [DISTWIDTH] IN BLOOD BY AUTOMATED COUNT: 12.9 % (ref 11.6–15.1)
GFR SERPL CREATININE-BSD FRML MDRD: 92 ML/MIN/1.73SQ M
GLUCOSE SERPL-MCNC: 149 MG/DL (ref 65–140)
HCT VFR BLD AUTO: 33.5 % (ref 34.8–46.1)
HGB BLD-MCNC: 11.4 G/DL (ref 11.5–15.4)
MCH RBC QN AUTO: 30.3 PG (ref 26.8–34.3)
MCHC RBC AUTO-ENTMCNC: 34 G/DL (ref 31.4–37.4)
MCV RBC AUTO: 89 FL (ref 82–98)
PLATELET # BLD AUTO: 212 THOUSANDS/UL (ref 149–390)
PMV BLD AUTO: 9.5 FL (ref 8.9–12.7)
POTASSIUM SERPL-SCNC: 3.1 MMOL/L (ref 3.5–5.3)
RBC # BLD AUTO: 3.76 MILLION/UL (ref 3.81–5.12)
RH BLD: POSITIVE
SODIUM SERPL-SCNC: 141 MMOL/L (ref 136–145)
WBC # BLD AUTO: 14.92 THOUSAND/UL (ref 4.31–10.16)

## 2020-12-14 PROCEDURE — 88309 TISSUE EXAM BY PATHOLOGIST: CPT | Performed by: PATHOLOGY

## 2020-12-14 PROCEDURE — 0DTF4ZZ RESECTION OF RIGHT LARGE INTESTINE, PERCUTANEOUS ENDOSCOPIC APPROACH: ICD-10-PCS | Performed by: SURGERY

## 2020-12-14 PROCEDURE — 85027 COMPLETE CBC AUTOMATED: CPT | Performed by: SURGERY

## 2020-12-14 PROCEDURE — 80048 BASIC METABOLIC PNL TOTAL CA: CPT | Performed by: SURGERY

## 2020-12-14 PROCEDURE — 44204 LAPARO PARTIAL COLECTOMY: CPT | Performed by: SURGERY

## 2020-12-14 PROCEDURE — NC001 PR NO CHARGE: Performed by: NURSE PRACTITIONER

## 2020-12-14 RX ORDER — ROCURONIUM BROMIDE 10 MG/ML
INJECTION, SOLUTION INTRAVENOUS AS NEEDED
Status: DISCONTINUED | OUTPATIENT
Start: 2020-12-14 | End: 2020-12-14

## 2020-12-14 RX ORDER — LIDOCAINE HYDROCHLORIDE 10 MG/ML
INJECTION, SOLUTION EPIDURAL; INFILTRATION; INTRACAUDAL; PERINEURAL AS NEEDED
Status: DISCONTINUED | OUTPATIENT
Start: 2020-12-14 | End: 2020-12-14

## 2020-12-14 RX ORDER — ESCITALOPRAM OXALATE 10 MG/1
10 TABLET ORAL DAILY
Status: DISCONTINUED | OUTPATIENT
Start: 2020-12-14 | End: 2020-12-18 | Stop reason: HOSPADM

## 2020-12-14 RX ORDER — NICOTINE 21 MG/24HR
1 PATCH, TRANSDERMAL 24 HOURS TRANSDERMAL DAILY
Status: DISCONTINUED | OUTPATIENT
Start: 2020-12-14 | End: 2020-12-15

## 2020-12-14 RX ORDER — GLYCOPYRROLATE 0.2 MG/ML
INJECTION INTRAMUSCULAR; INTRAVENOUS AS NEEDED
Status: DISCONTINUED | OUTPATIENT
Start: 2020-12-14 | End: 2020-12-14

## 2020-12-14 RX ORDER — ONDANSETRON 2 MG/ML
4 INJECTION INTRAMUSCULAR; INTRAVENOUS EVERY 6 HOURS PRN
Status: DISCONTINUED | OUTPATIENT
Start: 2020-12-14 | End: 2020-12-18 | Stop reason: HOSPADM

## 2020-12-14 RX ORDER — KETAMINE HCL IN NACL, ISO-OSM 100MG/10ML
SYRINGE (ML) INJECTION AS NEEDED
Status: DISCONTINUED | OUTPATIENT
Start: 2020-12-14 | End: 2020-12-14

## 2020-12-14 RX ORDER — SODIUM CHLORIDE 9 MG/ML
INJECTION, SOLUTION INTRAVENOUS CONTINUOUS PRN
Status: DISCONTINUED | OUTPATIENT
Start: 2020-12-14 | End: 2020-12-14

## 2020-12-14 RX ORDER — SODIUM CHLORIDE, SODIUM LACTATE, POTASSIUM CHLORIDE, CALCIUM CHLORIDE 600; 310; 30; 20 MG/100ML; MG/100ML; MG/100ML; MG/100ML
125 INJECTION, SOLUTION INTRAVENOUS CONTINUOUS
Status: DISCONTINUED | OUTPATIENT
Start: 2020-12-14 | End: 2020-12-15

## 2020-12-14 RX ORDER — FENTANYL CITRATE 50 UG/ML
INJECTION, SOLUTION INTRAMUSCULAR; INTRAVENOUS AS NEEDED
Status: DISCONTINUED | OUTPATIENT
Start: 2020-12-14 | End: 2020-12-14

## 2020-12-14 RX ORDER — ASPIRIN 81 MG/1
81 TABLET, CHEWABLE ORAL DAILY
Status: DISCONTINUED | OUTPATIENT
Start: 2020-12-15 | End: 2020-12-18 | Stop reason: HOSPADM

## 2020-12-14 RX ORDER — ATORVASTATIN CALCIUM 10 MG/1
10 TABLET, FILM COATED ORAL DAILY
Status: DISCONTINUED | OUTPATIENT
Start: 2020-12-14 | End: 2020-12-18 | Stop reason: HOSPADM

## 2020-12-14 RX ORDER — PROPOFOL 10 MG/ML
INJECTION, EMULSION INTRAVENOUS AS NEEDED
Status: DISCONTINUED | OUTPATIENT
Start: 2020-12-14 | End: 2020-12-14

## 2020-12-14 RX ORDER — DIPHENHYDRAMINE HYDROCHLORIDE 50 MG/ML
INJECTION INTRAMUSCULAR; INTRAVENOUS AS NEEDED
Status: DISCONTINUED | OUTPATIENT
Start: 2020-12-14 | End: 2020-12-14

## 2020-12-14 RX ORDER — DEXAMETHASONE SODIUM PHOSPHATE 10 MG/ML
INJECTION, SOLUTION INTRAMUSCULAR; INTRAVENOUS AS NEEDED
Status: DISCONTINUED | OUTPATIENT
Start: 2020-12-14 | End: 2020-12-14

## 2020-12-14 RX ORDER — BUPIVACAINE HYDROCHLORIDE 2.5 MG/ML
INJECTION, SOLUTION EPIDURAL; INFILTRATION; INTRACAUDAL AS NEEDED
Status: DISCONTINUED | OUTPATIENT
Start: 2020-12-14 | End: 2020-12-14 | Stop reason: HOSPADM

## 2020-12-14 RX ORDER — FENTANYL CITRATE/PF 50 MCG/ML
25 SYRINGE (ML) INJECTION
Status: DISCONTINUED | OUTPATIENT
Start: 2020-12-14 | End: 2020-12-14 | Stop reason: HOSPADM

## 2020-12-14 RX ORDER — SODIUM CHLORIDE, SODIUM LACTATE, POTASSIUM CHLORIDE, CALCIUM CHLORIDE 600; 310; 30; 20 MG/100ML; MG/100ML; MG/100ML; MG/100ML
125 INJECTION, SOLUTION INTRAVENOUS CONTINUOUS
Status: DISCONTINUED | OUTPATIENT
Start: 2020-12-14 | End: 2020-12-14

## 2020-12-14 RX ORDER — HYDROMORPHONE HCL/PF 1 MG/ML
SYRINGE (ML) INJECTION AS NEEDED
Status: DISCONTINUED | OUTPATIENT
Start: 2020-12-14 | End: 2020-12-14

## 2020-12-14 RX ORDER — HYDROMORPHONE HCL/PF 1 MG/ML
0.5 SYRINGE (ML) INJECTION
Status: DISCONTINUED | OUTPATIENT
Start: 2020-12-14 | End: 2020-12-14 | Stop reason: HOSPADM

## 2020-12-14 RX ORDER — NALOXONE HYDROCHLORIDE 0.4 MG/ML
0.1 INJECTION, SOLUTION INTRAMUSCULAR; INTRAVENOUS; SUBCUTANEOUS
Status: DISCONTINUED | OUTPATIENT
Start: 2020-12-14 | End: 2020-12-18 | Stop reason: HOSPADM

## 2020-12-14 RX ORDER — MAGNESIUM HYDROXIDE 1200 MG/15ML
LIQUID ORAL AS NEEDED
Status: DISCONTINUED | OUTPATIENT
Start: 2020-12-14 | End: 2020-12-14 | Stop reason: HOSPADM

## 2020-12-14 RX ORDER — MAGNESIUM SULFATE HEPTAHYDRATE 40 MG/ML
INJECTION, SOLUTION INTRAVENOUS AS NEEDED
Status: DISCONTINUED | OUTPATIENT
Start: 2020-12-14 | End: 2020-12-14

## 2020-12-14 RX ORDER — HEPARIN SODIUM 5000 [USP'U]/ML
5000 INJECTION, SOLUTION INTRAVENOUS; SUBCUTANEOUS EVERY 8 HOURS SCHEDULED
Status: DISCONTINUED | OUTPATIENT
Start: 2020-12-14 | End: 2020-12-18 | Stop reason: HOSPADM

## 2020-12-14 RX ORDER — LIDOCAINE HYDROCHLORIDE 10 MG/ML
0.5 INJECTION, SOLUTION EPIDURAL; INFILTRATION; INTRACAUDAL; PERINEURAL ONCE AS NEEDED
Status: COMPLETED | OUTPATIENT
Start: 2020-12-14 | End: 2020-12-14

## 2020-12-14 RX ORDER — MIDAZOLAM HYDROCHLORIDE 2 MG/2ML
INJECTION, SOLUTION INTRAMUSCULAR; INTRAVENOUS AS NEEDED
Status: DISCONTINUED | OUTPATIENT
Start: 2020-12-14 | End: 2020-12-14

## 2020-12-14 RX ORDER — CEFAZOLIN SODIUM 1 G/50ML
1000 SOLUTION INTRAVENOUS ONCE
Status: COMPLETED | OUTPATIENT
Start: 2020-12-14 | End: 2020-12-14

## 2020-12-14 RX ORDER — ONDANSETRON 2 MG/ML
INJECTION INTRAMUSCULAR; INTRAVENOUS AS NEEDED
Status: DISCONTINUED | OUTPATIENT
Start: 2020-12-14 | End: 2020-12-14

## 2020-12-14 RX ORDER — ALBUMIN, HUMAN INJ 5% 5 %
SOLUTION INTRAVENOUS CONTINUOUS PRN
Status: DISCONTINUED | OUTPATIENT
Start: 2020-12-14 | End: 2020-12-14

## 2020-12-14 RX ADMIN — GLYCOPYRROLATE 0.1 MG: 0.2 INJECTION, SOLUTION INTRAMUSCULAR; INTRAVENOUS at 12:15

## 2020-12-14 RX ADMIN — FENTANYL CITRATE 50 MCG: 50 INJECTION INTRAMUSCULAR; INTRAVENOUS at 12:28

## 2020-12-14 RX ADMIN — MAGNESIUM SULFATE HEPTAHYDRATE 2 G: 40 INJECTION, SOLUTION INTRAVENOUS at 12:45

## 2020-12-14 RX ADMIN — ONDANSETRON 4 MG: 2 INJECTION INTRAMUSCULAR; INTRAVENOUS at 12:15

## 2020-12-14 RX ADMIN — HYDROMORPHONE HYDROCHLORIDE 1 MG: 1 INJECTION, SOLUTION INTRAMUSCULAR; INTRAVENOUS; SUBCUTANEOUS at 14:49

## 2020-12-14 RX ADMIN — CEFAZOLIN SODIUM 2000 MG: 1 SOLUTION INTRAVENOUS at 12:11

## 2020-12-14 RX ADMIN — LIDOCAINE HYDROCHLORIDE 0.5 ML: 10 INJECTION, SOLUTION EPIDURAL; INFILTRATION; INTRACAUDAL; PERINEURAL at 11:41

## 2020-12-14 RX ADMIN — Medication 50 MG: at 12:28

## 2020-12-14 RX ADMIN — LIDOCAINE HYDROCHLORIDE 50 MG: 10 INJECTION, SOLUTION EPIDURAL; INFILTRATION; INTRACAUDAL; PERINEURAL at 12:28

## 2020-12-14 RX ADMIN — PROPOFOL 160 MG: 10 INJECTION, EMULSION INTRAVENOUS at 12:28

## 2020-12-14 RX ADMIN — SODIUM CHLORIDE, SODIUM LACTATE, POTASSIUM CHLORIDE, AND CALCIUM CHLORIDE 125 ML/HR: .6; .31; .03; .02 INJECTION, SOLUTION INTRAVENOUS at 16:46

## 2020-12-14 RX ADMIN — MIDAZOLAM 2 MG: 1 INJECTION INTRAMUSCULAR; INTRAVENOUS at 12:15

## 2020-12-14 RX ADMIN — SODIUM CHLORIDE 73 MCG: 9 INJECTION, SOLUTION INTRAVENOUS at 12:45

## 2020-12-14 RX ADMIN — ATORVASTATIN CALCIUM 10 MG: 10 TABLET, FILM COATED ORAL at 18:25

## 2020-12-14 RX ADMIN — ROCURONIUM BROMIDE 50 MG: 50 INJECTION, SOLUTION INTRAVENOUS at 12:28

## 2020-12-14 RX ADMIN — METRONIDAZOLE 500 MG: 500 INJECTION, SOLUTION INTRAVENOUS at 12:11

## 2020-12-14 RX ADMIN — Medication: at 16:30

## 2020-12-14 RX ADMIN — SODIUM CHLORIDE, SODIUM LACTATE, POTASSIUM CHLORIDE, AND CALCIUM CHLORIDE: .6; .31; .03; .02 INJECTION, SOLUTION INTRAVENOUS at 11:44

## 2020-12-14 RX ADMIN — DIPHENHYDRAMINE HYDROCHLORIDE 25 MG: 50 INJECTION, SOLUTION INTRAMUSCULAR; INTRAVENOUS at 14:51

## 2020-12-14 RX ADMIN — SODIUM CHLORIDE, SODIUM LACTATE, POTASSIUM CHLORIDE, AND CALCIUM CHLORIDE 125 ML/HR: .6; .31; .03; .02 INJECTION, SOLUTION INTRAVENOUS at 21:08

## 2020-12-14 RX ADMIN — HEPARIN SODIUM 5000 UNITS: 5000 INJECTION INTRAVENOUS; SUBCUTANEOUS at 18:25

## 2020-12-14 RX ADMIN — PHENYLEPHRINE HYDROCHLORIDE 200 MCG: 10 INJECTION INTRAVENOUS at 13:25

## 2020-12-14 RX ADMIN — DEXAMETHASONE SODIUM PHOSPHATE 10 MG: 10 INJECTION, SOLUTION INTRAMUSCULAR; INTRAVENOUS at 12:58

## 2020-12-14 RX ADMIN — SODIUM CHLORIDE: 0.9 INJECTION, SOLUTION INTRAVENOUS at 12:37

## 2020-12-14 RX ADMIN — SUGAMMADEX 200 MG: 100 INJECTION, SOLUTION INTRAVENOUS at 14:47

## 2020-12-14 RX ADMIN — FENTANYL CITRATE 50 MCG: 50 INJECTION INTRAMUSCULAR; INTRAVENOUS at 13:11

## 2020-12-14 RX ADMIN — ONDANSETRON 4 MG: 2 INJECTION INTRAMUSCULAR; INTRAVENOUS at 14:48

## 2020-12-14 RX ADMIN — ROCURONIUM BROMIDE 20 MG: 50 INJECTION, SOLUTION INTRAVENOUS at 13:10

## 2020-12-14 RX ADMIN — ESCITALOPRAM OXALATE 10 MG: 10 TABLET ORAL at 18:25

## 2020-12-14 RX ADMIN — PHENYLEPHRINE HYDROCHLORIDE 200 MCG: 10 INJECTION INTRAVENOUS at 13:43

## 2020-12-14 RX ADMIN — ALBUMIN (HUMAN): 12.5 INJECTION, SOLUTION INTRAVENOUS at 13:54

## 2020-12-14 RX ADMIN — SODIUM CHLORIDE, SODIUM LACTATE, POTASSIUM CHLORIDE, AND CALCIUM CHLORIDE 125 ML/HR: .6; .31; .03; .02 INJECTION, SOLUTION INTRAVENOUS at 11:41

## 2020-12-15 LAB
ANION GAP SERPL CALCULATED.3IONS-SCNC: 8 MMOL/L (ref 4–13)
BASOPHILS # BLD AUTO: 0.02 THOUSANDS/ΜL (ref 0–0.1)
BASOPHILS NFR BLD AUTO: 0 % (ref 0–1)
BUN SERPL-MCNC: 11 MG/DL (ref 5–25)
CALCIUM SERPL-MCNC: 7.8 MG/DL (ref 8.3–10.1)
CHLORIDE SERPL-SCNC: 111 MMOL/L (ref 100–108)
CO2 SERPL-SCNC: 21 MMOL/L (ref 21–32)
CREAT SERPL-MCNC: 0.44 MG/DL (ref 0.6–1.3)
EOSINOPHIL # BLD AUTO: 0 THOUSAND/ΜL (ref 0–0.61)
EOSINOPHIL NFR BLD AUTO: 0 % (ref 0–6)
ERYTHROCYTE [DISTWIDTH] IN BLOOD BY AUTOMATED COUNT: 13 % (ref 11.6–15.1)
GFR SERPL CREATININE-BSD FRML MDRD: 106 ML/MIN/1.73SQ M
GLUCOSE SERPL-MCNC: 90 MG/DL (ref 65–140)
HCT VFR BLD AUTO: 28.6 % (ref 34.8–46.1)
HGB BLD-MCNC: 9.3 G/DL (ref 11.5–15.4)
IMM GRANULOCYTES # BLD AUTO: 0.06 THOUSAND/UL (ref 0–0.2)
IMM GRANULOCYTES NFR BLD AUTO: 1 % (ref 0–2)
LYMPHOCYTES # BLD AUTO: 1.57 THOUSANDS/ΜL (ref 0.6–4.47)
LYMPHOCYTES NFR BLD AUTO: 12 % (ref 14–44)
MAGNESIUM SERPL-MCNC: 1.7 MG/DL (ref 1.6–2.6)
MCH RBC QN AUTO: 30.3 PG (ref 26.8–34.3)
MCHC RBC AUTO-ENTMCNC: 32.5 G/DL (ref 31.4–37.4)
MCV RBC AUTO: 93 FL (ref 82–98)
MONOCYTES # BLD AUTO: 1.41 THOUSAND/ΜL (ref 0.17–1.22)
MONOCYTES NFR BLD AUTO: 11 % (ref 4–12)
NEUTROPHILS # BLD AUTO: 10.03 THOUSANDS/ΜL (ref 1.85–7.62)
NEUTS SEG NFR BLD AUTO: 76 % (ref 43–75)
NRBC BLD AUTO-RTO: 0 /100 WBCS
PLATELET # BLD AUTO: 201 THOUSANDS/UL (ref 149–390)
PMV BLD AUTO: 10.5 FL (ref 8.9–12.7)
POTASSIUM SERPL-SCNC: 4.7 MMOL/L (ref 3.5–5.3)
RBC # BLD AUTO: 3.07 MILLION/UL (ref 3.81–5.12)
SODIUM SERPL-SCNC: 140 MMOL/L (ref 136–145)
WBC # BLD AUTO: 13.09 THOUSAND/UL (ref 4.31–10.16)

## 2020-12-15 PROCEDURE — 83735 ASSAY OF MAGNESIUM: CPT | Performed by: SURGERY

## 2020-12-15 PROCEDURE — 97163 PT EVAL HIGH COMPLEX 45 MIN: CPT

## 2020-12-15 PROCEDURE — 97167 OT EVAL HIGH COMPLEX 60 MIN: CPT

## 2020-12-15 PROCEDURE — 85025 COMPLETE CBC W/AUTO DIFF WBC: CPT | Performed by: SURGERY

## 2020-12-15 PROCEDURE — 80048 BASIC METABOLIC PNL TOTAL CA: CPT | Performed by: SURGERY

## 2020-12-15 PROCEDURE — 99024 POSTOP FOLLOW-UP VISIT: CPT | Performed by: SURGERY

## 2020-12-15 RX ORDER — POTASSIUM CHLORIDE 20 MEQ/1
40 TABLET, EXTENDED RELEASE ORAL ONCE
Status: COMPLETED | OUTPATIENT
Start: 2020-12-15 | End: 2020-12-15

## 2020-12-15 RX ORDER — POTASSIUM CHLORIDE 14.9 MG/ML
20 INJECTION INTRAVENOUS
Status: COMPLETED | OUTPATIENT
Start: 2020-12-15 | End: 2020-12-15

## 2020-12-15 RX ORDER — DEXTROSE, SODIUM CHLORIDE, AND POTASSIUM CHLORIDE 5; .45; .15 G/100ML; G/100ML; G/100ML
75 INJECTION INTRAVENOUS CONTINUOUS
Status: DISCONTINUED | OUTPATIENT
Start: 2020-12-15 | End: 2020-12-18

## 2020-12-15 RX ORDER — CALCIUM CARBONATE 200(500)MG
500 TABLET,CHEWABLE ORAL 3 TIMES DAILY PRN
Status: DISCONTINUED | OUTPATIENT
Start: 2020-12-15 | End: 2020-12-18 | Stop reason: HOSPADM

## 2020-12-15 RX ORDER — MAGNESIUM SULFATE HEPTAHYDRATE 40 MG/ML
2 INJECTION, SOLUTION INTRAVENOUS ONCE
Status: COMPLETED | OUTPATIENT
Start: 2020-12-15 | End: 2020-12-15

## 2020-12-15 RX ORDER — PANTOPRAZOLE SODIUM 40 MG/1
40 TABLET, DELAYED RELEASE ORAL
Status: DISCONTINUED | OUTPATIENT
Start: 2020-12-15 | End: 2020-12-18 | Stop reason: HOSPADM

## 2020-12-15 RX ADMIN — ESCITALOPRAM OXALATE 10 MG: 10 TABLET ORAL at 08:11

## 2020-12-15 RX ADMIN — HEPARIN SODIUM 5000 UNITS: 5000 INJECTION INTRAVENOUS; SUBCUTANEOUS at 06:27

## 2020-12-15 RX ADMIN — PANTOPRAZOLE SODIUM 40 MG: 40 TABLET, DELAYED RELEASE ORAL at 08:11

## 2020-12-15 RX ADMIN — CALCIUM CARBONATE (ANTACID) CHEW TAB 500 MG 500 MG: 500 CHEW TAB at 16:11

## 2020-12-15 RX ADMIN — ATORVASTATIN CALCIUM 10 MG: 10 TABLET, FILM COATED ORAL at 08:11

## 2020-12-15 RX ADMIN — POTASSIUM CHLORIDE 20 MEQ: 14.9 INJECTION, SOLUTION INTRAVENOUS at 01:27

## 2020-12-15 RX ADMIN — DEXTROSE, SODIUM CHLORIDE, AND POTASSIUM CHLORIDE 100 ML/HR: 5; .45; .15 INJECTION INTRAVENOUS at 08:12

## 2020-12-15 RX ADMIN — DEXTROSE, SODIUM CHLORIDE, AND POTASSIUM CHLORIDE 100 ML/HR: 5; .45; .15 INJECTION INTRAVENOUS at 17:21

## 2020-12-15 RX ADMIN — SODIUM CHLORIDE, SODIUM LACTATE, POTASSIUM CHLORIDE, AND CALCIUM CHLORIDE 500 ML: .6; .31; .03; .02 INJECTION, SOLUTION INTRAVENOUS at 06:27

## 2020-12-15 RX ADMIN — SODIUM CHLORIDE, SODIUM LACTATE, POTASSIUM CHLORIDE, AND CALCIUM CHLORIDE 125 ML/HR: .6; .31; .03; .02 INJECTION, SOLUTION INTRAVENOUS at 04:39

## 2020-12-15 RX ADMIN — MAGNESIUM SULFATE HEPTAHYDRATE 2 G: 40 INJECTION, SOLUTION INTRAVENOUS at 10:11

## 2020-12-15 RX ADMIN — HEPARIN SODIUM 5000 UNITS: 5000 INJECTION INTRAVENOUS; SUBCUTANEOUS at 22:05

## 2020-12-15 RX ADMIN — POTASSIUM CHLORIDE 20 MEQ: 14.9 INJECTION, SOLUTION INTRAVENOUS at 04:36

## 2020-12-15 RX ADMIN — HEPARIN SODIUM 5000 UNITS: 5000 INJECTION INTRAVENOUS; SUBCUTANEOUS at 13:30

## 2020-12-15 RX ADMIN — POTASSIUM CHLORIDE 40 MEQ: 1500 TABLET, EXTENDED RELEASE ORAL at 01:27

## 2020-12-15 RX ADMIN — ASPIRIN 81 MG CHEWABLE TABLET 81 MG: 81 TABLET CHEWABLE at 08:11

## 2020-12-16 LAB
ANION GAP SERPL CALCULATED.3IONS-SCNC: 5 MMOL/L (ref 4–13)
ANION GAP SERPL CALCULATED.3IONS-SCNC: 6 MMOL/L (ref 4–13)
BASOPHILS # BLD AUTO: 0.03 THOUSANDS/ΜL (ref 0–0.1)
BASOPHILS NFR BLD AUTO: 0 % (ref 0–1)
BUN SERPL-MCNC: 7 MG/DL (ref 5–25)
BUN SERPL-MCNC: 8 MG/DL (ref 5–25)
CALCIUM SERPL-MCNC: 7.8 MG/DL (ref 8.3–10.1)
CALCIUM SERPL-MCNC: 8.4 MG/DL (ref 8.3–10.1)
CHLORIDE SERPL-SCNC: 105 MMOL/L (ref 100–108)
CHLORIDE SERPL-SCNC: 106 MMOL/L (ref 100–108)
CO2 SERPL-SCNC: 26 MMOL/L (ref 21–32)
CO2 SERPL-SCNC: 28 MMOL/L (ref 21–32)
CREAT SERPL-MCNC: 0.54 MG/DL (ref 0.6–1.3)
CREAT SERPL-MCNC: 0.59 MG/DL (ref 0.6–1.3)
EOSINOPHIL # BLD AUTO: 0.16 THOUSAND/ΜL (ref 0–0.61)
EOSINOPHIL NFR BLD AUTO: 1 % (ref 0–6)
ERYTHROCYTE [DISTWIDTH] IN BLOOD BY AUTOMATED COUNT: 12.8 % (ref 11.6–15.1)
GFR SERPL CREATININE-BSD FRML MDRD: 96 ML/MIN/1.73SQ M
GFR SERPL CREATININE-BSD FRML MDRD: 99 ML/MIN/1.73SQ M
GLUCOSE SERPL-MCNC: 116 MG/DL (ref 65–140)
GLUCOSE SERPL-MCNC: 272 MG/DL (ref 65–140)
HCT VFR BLD AUTO: 28.9 % (ref 34.8–46.1)
HGB BLD-MCNC: 9.5 G/DL (ref 11.5–15.4)
IMM GRANULOCYTES # BLD AUTO: 0.07 THOUSAND/UL (ref 0–0.2)
IMM GRANULOCYTES NFR BLD AUTO: 1 % (ref 0–2)
LYMPHOCYTES # BLD AUTO: 1.48 THOUSANDS/ΜL (ref 0.6–4.47)
LYMPHOCYTES NFR BLD AUTO: 12 % (ref 14–44)
MAGNESIUM SERPL-MCNC: 2.1 MG/DL (ref 1.6–2.6)
MCH RBC QN AUTO: 30.2 PG (ref 26.8–34.3)
MCHC RBC AUTO-ENTMCNC: 32.9 G/DL (ref 31.4–37.4)
MCV RBC AUTO: 92 FL (ref 82–98)
MONOCYTES # BLD AUTO: 1.09 THOUSAND/ΜL (ref 0.17–1.22)
MONOCYTES NFR BLD AUTO: 9 % (ref 4–12)
NEUTROPHILS # BLD AUTO: 9.1 THOUSANDS/ΜL (ref 1.85–7.62)
NEUTS SEG NFR BLD AUTO: 77 % (ref 43–75)
NRBC BLD AUTO-RTO: 0 /100 WBCS
PLATELET # BLD AUTO: 191 THOUSANDS/UL (ref 149–390)
PMV BLD AUTO: 10.5 FL (ref 8.9–12.7)
POTASSIUM SERPL-SCNC: 4.3 MMOL/L (ref 3.5–5.3)
POTASSIUM SERPL-SCNC: 5 MMOL/L (ref 3.5–5.3)
RBC # BLD AUTO: 3.15 MILLION/UL (ref 3.81–5.12)
SODIUM SERPL-SCNC: 137 MMOL/L (ref 136–145)
SODIUM SERPL-SCNC: 139 MMOL/L (ref 136–145)
WBC # BLD AUTO: 11.93 THOUSAND/UL (ref 4.31–10.16)

## 2020-12-16 PROCEDURE — 83735 ASSAY OF MAGNESIUM: CPT | Performed by: PHYSICIAN ASSISTANT

## 2020-12-16 PROCEDURE — 97535 SELF CARE MNGMENT TRAINING: CPT

## 2020-12-16 PROCEDURE — 99024 POSTOP FOLLOW-UP VISIT: CPT | Performed by: SURGERY

## 2020-12-16 PROCEDURE — 80048 BASIC METABOLIC PNL TOTAL CA: CPT | Performed by: PHYSICIAN ASSISTANT

## 2020-12-16 PROCEDURE — 85025 COMPLETE CBC W/AUTO DIFF WBC: CPT | Performed by: PHYSICIAN ASSISTANT

## 2020-12-16 RX ORDER — ACETAMINOPHEN 325 MG/1
650 TABLET ORAL EVERY 6 HOURS PRN
Status: DISCONTINUED | OUTPATIENT
Start: 2020-12-16 | End: 2020-12-18 | Stop reason: HOSPADM

## 2020-12-16 RX ADMIN — HEPARIN SODIUM 5000 UNITS: 5000 INJECTION INTRAVENOUS; SUBCUTANEOUS at 22:58

## 2020-12-16 RX ADMIN — DEXTROSE, SODIUM CHLORIDE, AND POTASSIUM CHLORIDE 75 ML/HR: 5; .45; .15 INJECTION INTRAVENOUS at 15:17

## 2020-12-16 RX ADMIN — HEPARIN SODIUM 5000 UNITS: 5000 INJECTION INTRAVENOUS; SUBCUTANEOUS at 15:16

## 2020-12-16 RX ADMIN — DEXTROSE, SODIUM CHLORIDE, AND POTASSIUM CHLORIDE 100 ML/HR: 5; .45; .15 INJECTION INTRAVENOUS at 03:33

## 2020-12-16 RX ADMIN — ATORVASTATIN CALCIUM 10 MG: 10 TABLET, FILM COATED ORAL at 09:30

## 2020-12-16 RX ADMIN — ASPIRIN 81 MG CHEWABLE TABLET 81 MG: 81 TABLET CHEWABLE at 09:30

## 2020-12-16 RX ADMIN — Medication: at 23:08

## 2020-12-16 RX ADMIN — ESCITALOPRAM OXALATE 10 MG: 10 TABLET ORAL at 09:30

## 2020-12-16 RX ADMIN — PANTOPRAZOLE SODIUM 40 MG: 40 TABLET, DELAYED RELEASE ORAL at 05:27

## 2020-12-16 RX ADMIN — HEPARIN SODIUM 5000 UNITS: 5000 INJECTION INTRAVENOUS; SUBCUTANEOUS at 05:27

## 2020-12-17 LAB
ANION GAP SERPL CALCULATED.3IONS-SCNC: 3 MMOL/L (ref 4–13)
BASOPHILS # BLD AUTO: 0.05 THOUSANDS/ΜL (ref 0–0.1)
BASOPHILS NFR BLD AUTO: 1 % (ref 0–1)
BUN SERPL-MCNC: 5 MG/DL (ref 5–25)
CALCIUM SERPL-MCNC: 8.9 MG/DL (ref 8.3–10.1)
CHLORIDE SERPL-SCNC: 105 MMOL/L (ref 100–108)
CO2 SERPL-SCNC: 30 MMOL/L (ref 21–32)
CREAT SERPL-MCNC: 0.49 MG/DL (ref 0.6–1.3)
EOSINOPHIL # BLD AUTO: 0.44 THOUSAND/ΜL (ref 0–0.61)
EOSINOPHIL NFR BLD AUTO: 5 % (ref 0–6)
ERYTHROCYTE [DISTWIDTH] IN BLOOD BY AUTOMATED COUNT: 12.4 % (ref 11.6–15.1)
GFR SERPL CREATININE-BSD FRML MDRD: 103 ML/MIN/1.73SQ M
GLUCOSE SERPL-MCNC: 100 MG/DL (ref 65–140)
HCT VFR BLD AUTO: 29.3 % (ref 34.8–46.1)
HGB BLD-MCNC: 9.7 G/DL (ref 11.5–15.4)
IMM GRANULOCYTES # BLD AUTO: 0.06 THOUSAND/UL (ref 0–0.2)
IMM GRANULOCYTES NFR BLD AUTO: 1 % (ref 0–2)
LYMPHOCYTES # BLD AUTO: 2.14 THOUSANDS/ΜL (ref 0.6–4.47)
LYMPHOCYTES NFR BLD AUTO: 22 % (ref 14–44)
MCH RBC QN AUTO: 30.4 PG (ref 26.8–34.3)
MCHC RBC AUTO-ENTMCNC: 33.1 G/DL (ref 31.4–37.4)
MCV RBC AUTO: 92 FL (ref 82–98)
MONOCYTES # BLD AUTO: 0.9 THOUSAND/ΜL (ref 0.17–1.22)
MONOCYTES NFR BLD AUTO: 9 % (ref 4–12)
NEUTROPHILS # BLD AUTO: 6.2 THOUSANDS/ΜL (ref 1.85–7.62)
NEUTS SEG NFR BLD AUTO: 62 % (ref 43–75)
NRBC BLD AUTO-RTO: 0 /100 WBCS
PLATELET # BLD AUTO: 206 THOUSANDS/UL (ref 149–390)
PMV BLD AUTO: 10.5 FL (ref 8.9–12.7)
POTASSIUM SERPL-SCNC: 3.8 MMOL/L (ref 3.5–5.3)
RBC # BLD AUTO: 3.19 MILLION/UL (ref 3.81–5.12)
SODIUM SERPL-SCNC: 138 MMOL/L (ref 136–145)
WBC # BLD AUTO: 9.79 THOUSAND/UL (ref 4.31–10.16)

## 2020-12-17 PROCEDURE — 97530 THERAPEUTIC ACTIVITIES: CPT

## 2020-12-17 PROCEDURE — 85025 COMPLETE CBC W/AUTO DIFF WBC: CPT | Performed by: PHYSICIAN ASSISTANT

## 2020-12-17 PROCEDURE — 99024 POSTOP FOLLOW-UP VISIT: CPT | Performed by: SURGERY

## 2020-12-17 PROCEDURE — 80048 BASIC METABOLIC PNL TOTAL CA: CPT | Performed by: PHYSICIAN ASSISTANT

## 2020-12-17 PROCEDURE — 97116 GAIT TRAINING THERAPY: CPT

## 2020-12-17 RX ORDER — GABAPENTIN 100 MG/1
100 CAPSULE ORAL 3 TIMES DAILY
Status: DISCONTINUED | OUTPATIENT
Start: 2020-12-17 | End: 2020-12-18 | Stop reason: HOSPADM

## 2020-12-17 RX ORDER — OXYCODONE HYDROCHLORIDE 10 MG/1
10 TABLET ORAL EVERY 4 HOURS PRN
Status: DISCONTINUED | OUTPATIENT
Start: 2020-12-17 | End: 2020-12-18 | Stop reason: HOSPADM

## 2020-12-17 RX ORDER — HYDROMORPHONE HCL/PF 1 MG/ML
0.5 SYRINGE (ML) INJECTION
Status: DISCONTINUED | OUTPATIENT
Start: 2020-12-17 | End: 2020-12-18 | Stop reason: HOSPADM

## 2020-12-17 RX ORDER — METHOCARBAMOL 500 MG/1
500 TABLET, FILM COATED ORAL EVERY 6 HOURS SCHEDULED
Status: DISCONTINUED | OUTPATIENT
Start: 2020-12-17 | End: 2020-12-18 | Stop reason: HOSPADM

## 2020-12-17 RX ORDER — OXYCODONE HYDROCHLORIDE 5 MG/1
5 TABLET ORAL EVERY 4 HOURS PRN
Status: DISCONTINUED | OUTPATIENT
Start: 2020-12-17 | End: 2020-12-18 | Stop reason: HOSPADM

## 2020-12-17 RX ORDER — POTASSIUM CHLORIDE 20 MEQ/1
40 TABLET, EXTENDED RELEASE ORAL ONCE
Status: COMPLETED | OUTPATIENT
Start: 2020-12-17 | End: 2020-12-17

## 2020-12-17 RX ORDER — DIPHENHYDRAMINE HYDROCHLORIDE 50 MG/ML
12.5 INJECTION INTRAMUSCULAR; INTRAVENOUS EVERY 6 HOURS PRN
Status: DISCONTINUED | OUTPATIENT
Start: 2020-12-17 | End: 2020-12-18 | Stop reason: HOSPADM

## 2020-12-17 RX ADMIN — METHOCARBAMOL TABLETS 500 MG: 500 TABLET, COATED ORAL at 09:57

## 2020-12-17 RX ADMIN — GABAPENTIN 100 MG: 100 CAPSULE ORAL at 21:30

## 2020-12-17 RX ADMIN — OXYCODONE HYDROCHLORIDE 10 MG: 10 TABLET ORAL at 16:24

## 2020-12-17 RX ADMIN — DEXTROSE, SODIUM CHLORIDE, AND POTASSIUM CHLORIDE 75 ML/HR: 5; .45; .15 INJECTION INTRAVENOUS at 05:40

## 2020-12-17 RX ADMIN — OXYCODONE HYDROCHLORIDE 5 MG: 5 TABLET ORAL at 09:59

## 2020-12-17 RX ADMIN — HEPARIN SODIUM 5000 UNITS: 5000 INJECTION INTRAVENOUS; SUBCUTANEOUS at 13:42

## 2020-12-17 RX ADMIN — PANTOPRAZOLE SODIUM 40 MG: 40 TABLET, DELAYED RELEASE ORAL at 05:40

## 2020-12-17 RX ADMIN — POTASSIUM CHLORIDE 40 MEQ: 1500 TABLET, EXTENDED RELEASE ORAL at 09:57

## 2020-12-17 RX ADMIN — HEPARIN SODIUM 5000 UNITS: 5000 INJECTION INTRAVENOUS; SUBCUTANEOUS at 05:40

## 2020-12-17 RX ADMIN — ESCITALOPRAM OXALATE 10 MG: 10 TABLET ORAL at 09:57

## 2020-12-17 RX ADMIN — DEXTROSE, SODIUM CHLORIDE, AND POTASSIUM CHLORIDE 75 ML/HR: 5; .45; .15 INJECTION INTRAVENOUS at 19:42

## 2020-12-17 RX ADMIN — METHOCARBAMOL TABLETS 500 MG: 500 TABLET, COATED ORAL at 17:56

## 2020-12-17 RX ADMIN — GABAPENTIN 100 MG: 100 CAPSULE ORAL at 09:57

## 2020-12-17 RX ADMIN — GABAPENTIN 100 MG: 100 CAPSULE ORAL at 16:24

## 2020-12-17 RX ADMIN — HEPARIN SODIUM 5000 UNITS: 5000 INJECTION INTRAVENOUS; SUBCUTANEOUS at 21:30

## 2020-12-17 RX ADMIN — ATORVASTATIN CALCIUM 10 MG: 10 TABLET, FILM COATED ORAL at 09:57

## 2020-12-17 RX ADMIN — ASPIRIN 81 MG CHEWABLE TABLET 81 MG: 81 TABLET CHEWABLE at 09:57

## 2020-12-18 VITALS
OXYGEN SATURATION: 93 % | TEMPERATURE: 99.7 F | HEIGHT: 61 IN | BODY MASS INDEX: 30.4 KG/M2 | DIASTOLIC BLOOD PRESSURE: 64 MMHG | HEART RATE: 86 BPM | WEIGHT: 161 LBS | SYSTOLIC BLOOD PRESSURE: 155 MMHG | RESPIRATION RATE: 18 BRPM

## 2020-12-18 LAB
ANION GAP SERPL CALCULATED.3IONS-SCNC: 4 MMOL/L (ref 4–13)
BUN SERPL-MCNC: 4 MG/DL (ref 5–25)
CALCIUM SERPL-MCNC: 8.8 MG/DL (ref 8.3–10.1)
CHLORIDE SERPL-SCNC: 103 MMOL/L (ref 100–108)
CO2 SERPL-SCNC: 28 MMOL/L (ref 21–32)
CREAT SERPL-MCNC: 0.63 MG/DL (ref 0.6–1.3)
ERYTHROCYTE [DISTWIDTH] IN BLOOD BY AUTOMATED COUNT: 12.4 % (ref 11.6–15.1)
GFR SERPL CREATININE-BSD FRML MDRD: 94 ML/MIN/1.73SQ M
GLUCOSE SERPL-MCNC: 137 MG/DL (ref 65–140)
HCT VFR BLD AUTO: 30.4 % (ref 34.8–46.1)
HGB BLD-MCNC: 10 G/DL (ref 11.5–15.4)
MCH RBC QN AUTO: 30.1 PG (ref 26.8–34.3)
MCHC RBC AUTO-ENTMCNC: 32.9 G/DL (ref 31.4–37.4)
MCV RBC AUTO: 92 FL (ref 82–98)
PLATELET # BLD AUTO: 217 THOUSANDS/UL (ref 149–390)
PMV BLD AUTO: 10.2 FL (ref 8.9–12.7)
POTASSIUM SERPL-SCNC: 3.9 MMOL/L (ref 3.5–5.3)
RBC # BLD AUTO: 3.32 MILLION/UL (ref 3.81–5.12)
SODIUM SERPL-SCNC: 135 MMOL/L (ref 136–145)
WBC # BLD AUTO: 8.97 THOUSAND/UL (ref 4.31–10.16)

## 2020-12-18 PROCEDURE — 99024 POSTOP FOLLOW-UP VISIT: CPT | Performed by: SURGERY

## 2020-12-18 PROCEDURE — 80048 BASIC METABOLIC PNL TOTAL CA: CPT | Performed by: SURGERY

## 2020-12-18 PROCEDURE — 85027 COMPLETE CBC AUTOMATED: CPT | Performed by: SURGERY

## 2020-12-18 PROCEDURE — NC001 PR NO CHARGE: Performed by: SURGERY

## 2020-12-18 RX ORDER — OXYCODONE HYDROCHLORIDE 5 MG/1
5 TABLET ORAL EVERY 6 HOURS PRN
Qty: 16 TABLET | Refills: 0 | Status: SHIPPED | OUTPATIENT
Start: 2020-12-18 | End: 2020-12-22

## 2020-12-18 RX ADMIN — OXYCODONE HYDROCHLORIDE 10 MG: 10 TABLET ORAL at 12:16

## 2020-12-18 RX ADMIN — ASPIRIN 81 MG CHEWABLE TABLET 81 MG: 81 TABLET CHEWABLE at 08:33

## 2020-12-18 RX ADMIN — PANTOPRAZOLE SODIUM 40 MG: 40 TABLET, DELAYED RELEASE ORAL at 06:01

## 2020-12-18 RX ADMIN — GABAPENTIN 100 MG: 100 CAPSULE ORAL at 08:33

## 2020-12-18 RX ADMIN — HEPARIN SODIUM 5000 UNITS: 5000 INJECTION INTRAVENOUS; SUBCUTANEOUS at 06:02

## 2020-12-18 RX ADMIN — METHOCARBAMOL TABLETS 500 MG: 500 TABLET, COATED ORAL at 06:02

## 2020-12-18 RX ADMIN — METHOCARBAMOL TABLETS 500 MG: 500 TABLET, COATED ORAL at 12:16

## 2020-12-18 RX ADMIN — ATORVASTATIN CALCIUM 10 MG: 10 TABLET, FILM COATED ORAL at 08:33

## 2020-12-18 RX ADMIN — ESCITALOPRAM OXALATE 10 MG: 10 TABLET ORAL at 08:33

## 2020-12-21 ENCOUNTER — TRANSITIONAL CARE MANAGEMENT (OUTPATIENT)
Dept: INTERNAL MEDICINE CLINIC | Facility: CLINIC | Age: 65
End: 2020-12-21

## 2020-12-23 ENCOUNTER — TELEMEDICINE (OUTPATIENT)
Dept: INTERNAL MEDICINE CLINIC | Facility: CLINIC | Age: 65
End: 2020-12-23
Payer: COMMERCIAL

## 2020-12-23 DIAGNOSIS — D12.0 ADENOMA OF CECUM: Primary | ICD-10-CM

## 2020-12-23 DIAGNOSIS — D50.9 IRON DEFICIENCY ANEMIA, UNSPECIFIED IRON DEFICIENCY ANEMIA TYPE: ICD-10-CM

## 2020-12-23 DIAGNOSIS — R19.7 DIARRHEA, UNSPECIFIED TYPE: ICD-10-CM

## 2020-12-23 DIAGNOSIS — E78.2 MIXED HYPERLIPIDEMIA: ICD-10-CM

## 2020-12-23 DIAGNOSIS — I10 ESSENTIAL HYPERTENSION: ICD-10-CM

## 2020-12-23 DIAGNOSIS — F17.200 SMOKING ADDICTION: ICD-10-CM

## 2020-12-23 PROCEDURE — 99496 TRANSJ CARE MGMT HIGH F2F 7D: CPT | Performed by: PHYSICIAN ASSISTANT

## 2020-12-30 ENCOUNTER — DOCUMENTATION (OUTPATIENT)
Dept: HEMATOLOGY ONCOLOGY | Facility: CLINIC | Age: 65
End: 2020-12-30

## 2021-01-05 ENCOUNTER — OFFICE VISIT (OUTPATIENT)
Dept: SURGICAL ONCOLOGY | Facility: CLINIC | Age: 66
End: 2021-01-05

## 2021-01-05 VITALS
TEMPERATURE: 98.1 F | HEIGHT: 61 IN | SYSTOLIC BLOOD PRESSURE: 124 MMHG | WEIGHT: 163 LBS | HEART RATE: 88 BPM | RESPIRATION RATE: 18 BRPM | DIASTOLIC BLOOD PRESSURE: 82 MMHG | BODY MASS INDEX: 30.78 KG/M2

## 2021-01-05 DIAGNOSIS — D12.0 ADENOMA OF CECUM: Primary | ICD-10-CM

## 2021-01-05 PROCEDURE — 3008F BODY MASS INDEX DOCD: CPT | Performed by: INTERNAL MEDICINE

## 2021-01-05 PROCEDURE — 99024 POSTOP FOLLOW-UP VISIT: CPT | Performed by: SURGERY

## 2021-01-05 NOTE — LETTER
January 5, 2021     Loi Bonilla MD  [address] [address]    Patient: Alysa Cardona   YOB: 1955   Date of Visit: 1/5/2021       Dear Dr Qamar Velazquez: Thank you for referring Alysa Cardona to me for evaluation  Below are my notes for this consultation  If you have questions, please do not hesitate to call me  I look forward to following your patient along with you  Sincerely,        Nargis Crowley MD        CC: Veryl Boast, MD Jannett Nora, MD Alise Mechanic, MD  1/5/2021 11:53 AM  Sign when Signing Visit               Surgical Oncology Follow Up       [address] [place] [id number]    Alysa Cardona  1955  [id number]  [address] [place] [address] SURGICAL ONCOLOGY [place]  [address] [id number]    Diagnoses and all orders for this visit:    Adenoma of cecum        Chief Complaint   Patient presents with    Follow-up       No follow-ups on file  Oncology History    No history exists  Staging:  Tubular adenoma, December 2020  Treatment history:  Laparoscopic right hemicolectomy December 2020  Current treatment:  Observation  Disease status: FAZAL    History of Present Illness:  Patient returns in follow-up of her laparoscopic right hemicolectomy  Her pathology was benign  She is doing well  Her only complaint is some occasional diarrhea, but this is improving  Review of Systems  Complete ROS Surg Onc:   Complete ROS Surg Onc:   Constitutional: The patient denies new or recent history of general fatigue, no recent weight loss, no change in appetite  Eyes: No complaints of visual problems, no scleral icterus  ENT: no complaints of ear pain, no hoarseness, no difficulty swallowing,  no tinnitus and no new masses in head, oral cavity, or neck     Cardiovascular: No complaints of chest pain, no palpitations, no ankle edema  Respiratory: No complaints of shortness of breath, no cough  Gastrointestinal: No complaints of jaundice, no bloody stools, no pale stools  Genitourinary: No complaints of dysuria, no hematuria, no nocturia, no frequent urination, no urethral discharge  Musculoskeletal: No complaints of weakness, paralysis, joint stiffness or arthralgias  Integumentary: No complaints of rash, no new lesions  Neurological: No complaints of convulsions, no seizures, no dizziness  Hematologic/Lymphatic: No complaints of easy bruising  Endocrine:  No hot or cold intolerance  No polydipsia, polyphagia, or polyuria  Allergy/immunology:  No environmental allergies  No food allergies  Not immunocompromised  Skin:  No pallor or rash  Surgical wound  Patient Active Problem List   Diagnosis    Prediabetes    Depression    Hyperlipidemia    Hypertension    Hypokalemia    Arthritis    Obesity (BMI 30-39  9)    Smoking addiction    Adenoma of cecum    Nodule of lower lobe of right lung     Past Medical History:   Diagnosis Date    Depression     Hyperlipidemia     Hypertension     Nodule of lower lobe of right lung      Past Surgical History:   Procedure Laterality Date    CHOLECYSTECTOMY      COLONOSCOPY      HEMICOLOECTOMY W/ ANASTOMOSIS N/A 12/14/2020    Procedure: LAPAROSCOPIC HAND-ASSISTED RIGHT HEMICOLECTOMY;  Surgeon: Emilia Duran MD;  [facility];  Service: Surgical Oncology    HYSTERECTOMY      partial     Family History   Problem Relation Age of Onset    Throat cancer Mother     Heart disease Father      Social History     Socioeconomic History    Marital status:       Spouse name: Not on file    Number of children: Not on file    Years of education: Not on file    Highest education level: Not on file   Occupational History    Not on file   Social Needs    Financial resource strain: Not on file    Food insecurity     Worry: Not on file Inability: Not on file    Transportation needs     Medical: Not on file     Non-medical: Not on file   Tobacco Use    Smoking status: Current Every Day Smoker     Packs/day: 0 50     Years: 52 00     Pack years: 26 00     Types: Cigarettes    Smokeless tobacco: Never Used   Substance and Sexual Activity    Alcohol use: Not Currently    Drug use: Never    Sexual activity: Not Currently   Lifestyle    Physical activity     Days per week: 7 days     Minutes per session: Not on file    Stress:  To some extent   Relationships    Social connections     Talks on phone: Not on file     Gets together: Not on file     Attends [employer] service: Not on file     Active member of club or organization: Not on file     Attends meetings of clubs or organizations: Not on file     Relationship status: Not on file    Intimate partner violence     Fear of current or ex partner: Not on file     Emotionally abused: Not on file     Physically abused: Not on file     Forced sexual activity: Not on file   Other Topics Concern    Not on file   Social History Narrative    Not on file       Current Outpatient Medications:     ASPIRIN 81 PO, Take 81 mg by mouth daily , Disp: , Rfl:     atorvastatin (LIPITOR) 10 mg tablet, Take 1 tablet (10 mg total) by mouth daily, Disp: 90 tablet, Rfl: 2    colestipol (COLESTID) 1 g tablet, Take 2 tablets (2 g total) by mouth 2 (two) times a day, Disp: 60 tablet, Rfl: 5    escitalopram (Lexapro) 20 mg tablet, Take 0 5 tablets (10 mg total) by mouth daily, Disp: 90 tablet, Rfl: 0    fenofibrate (TRIGLIDE) 160 MG tablet, Take 1 tablet (160 mg total) by mouth daily, Disp: 90 tablet, Rfl: 3    furosemide (LASIX) 20 mg tablet, Take 1 tablet (20 mg total) by mouth daily, Disp: 90 tablet, Rfl: 2    Loratadine 10 MG CAPS, loratadine 10 mg capsule  Take by oral route , Disp: , Rfl:     Misc Natural Products (OSTEO BI-FLEX ADV DOUBLE ST PO), Take by mouth, Disp: , Rfl:     omeprazole (PriLOSEC) 20 mg delayed release capsule, 20 mg daily , Disp: , Rfl:     Potassium Chloride ER 20 MEQ TBCR, Take 1 tablet (20 mEq total) by mouth daily, Disp: 90 tablet, Rfl: 3    TURMERIC PO, Take by mouth, Disp: , Rfl:   Allergies   Allergen Reactions    Codeine Rash     There were no vitals filed for this visit  Physical Exam  Constitutional: General appearance: The Patient is well-developed and well-nourished who appears the stated age in no acute distress  Patient is pleasant and talkative  HEENT:  Normocephalic  Sclerae are anicteric  Mucous membranes are moist     Abdomen: Abdomen is soft, non-tender, non-distended and without masses  Incisions C/D/I     Extremities: There is no clubbing or cyanosis  There is no edema  Symmetric  Neuro: Grossly nonfocal  Gait is normal      Skin: Warm, anicteric  Psych:  Patient is pleasant and talkative  Breasts:        Pathology:  Final Diagnosis   A  Terminal ileum, appendix, right colon (resection):  - Tubular adenoma (3 cm)  - No high grade dysplasia   - Benign appendix  - Twenty-six (26) lymph nodes negative for carcinoma (0/26)  - Margins negative for dysplasia    Electronically signed by Vivian Joya MD on 12/17/2020 at  4:37 PM         Labs:      Imaging  No results found  I reviewed the above laboratory and imaging data  Discussion/Summary:  17-year-old female status post laparoscopic right hemicolectomy  Her final pathology was benign  Since her polypectomy pathology was negative and the excisional biopsy was negative, this was a benign lesion  She will continue to follow up with Gastroenterology for repeat colonoscopy in the next year  If her diarrhea becomes worse she will contact us  I did discuss that she can continue with Questran or start Imodium as needed  I will see her again in the future should the need arise  She will follow-up with thoracic surgery regarding her lung nodule  She is agreeable to this    All her questions were answered

## 2021-01-05 NOTE — PROGRESS NOTES
Surgical Oncology Follow Up       [facility]  [facility] [place]  [address]   [place] [address]    Chetna Sandhu  1955  [id number]  [address]  [facility]  [address] [facility] [place] [address]    Diagnoses and all orders for this visit:    Adenoma of cecum        Chief Complaint   Patient presents with    Follow-up       No follow-ups on file  Oncology History    No history exists  Staging:  Tubular adenoma, December 2020  Treatment history:  Laparoscopic right hemicolectomy December 2020  Current treatment:  Observation  Disease status: FAZAL    History of Present Illness:  Patient returns in follow-up of her laparoscopic right hemicolectomy  Her pathology was benign  She is doing well  Her only complaint is some occasional diarrhea, but this is improving  Review of Systems  Complete ROS Surg Onc:   Complete ROS Surg Onc:   Constitutional: The patient denies new or recent history of general fatigue, no recent weight loss, no change in appetite  Eyes: No complaints of visual problems, no scleral icterus  ENT: no complaints of ear pain, no hoarseness, no difficulty swallowing,  no tinnitus and no new masses in head, oral cavity, or neck  Cardiovascular: No complaints of chest pain, no palpitations, no ankle edema  Respiratory: No complaints of shortness of breath, no cough  Gastrointestinal: No complaints of jaundice, no bloody stools, no pale stools  Genitourinary: No complaints of dysuria, no hematuria, no nocturia, no frequent urination, no urethral discharge  Musculoskeletal: No complaints of weakness, paralysis, joint stiffness or arthralgias  Integumentary: No complaints of rash, no new lesions  Neurological: No complaints of convulsions, no seizures, no dizziness  Hematologic/Lymphatic: No complaints of easy bruising     Endocrine:  No hot or cold intolerance  No polydipsia, polyphagia, or polyuria  Allergy/immunology:  No environmental allergies  No food allergies  Not immunocompromised  Skin:  No pallor or rash  Surgical wound  Patient Active Problem List   Diagnosis    Prediabetes    Depression    Hyperlipidemia    Hypertension    Hypokalemia    Arthritis    Obesity (BMI 30-39  9)    Smoking addiction    Adenoma of cecum    Nodule of lower lobe of right lung     Past Medical History:   Diagnosis Date    Depression     Hyperlipidemia     Hypertension     Nodule of lower lobe of right lung      Past Surgical History:   Procedure Laterality Date    CHOLECYSTECTOMY      COLONOSCOPY      HEMICOLOECTOMY W/ ANASTOMOSIS N/A 12/14/2020    Procedure: LAPAROSCOPIC HAND-ASSISTED RIGHT HEMICOLECTOMY;  Surgeon: Dea Fuentes MD;  [facility];  Service: Surgical Oncology    HYSTERECTOMY      partial     Family History   Problem Relation Age of Onset    Throat cancer Mother     Heart disease Father      Social History     Socioeconomic History    Marital status:      Spouse name: Not on file    Number of children: Not on file    Years of education: Not on file    Highest education level: Not on file   Occupational History    Not on file   Social Needs    Financial resource strain: Not on file    Food insecurity     Worry: Not on file     Inability: Not on file    Transportation needs     Medical: Not on file     Non-medical: Not on file   Tobacco Use    Smoking status: Current Every Day Smoker     Packs/day: 0 50     Years: 52 00     Pack years: 26 00     Types: Cigarettes    Smokeless tobacco: Never Used   Substance and Sexual Activity    Alcohol use: Not Currently    Drug use: Never    Sexual activity: Not Currently   Lifestyle    Physical activity     Days per week: 7 days     Minutes per session: Not on file    Stress:  To some extent   Relationships    Social connections     Talks on phone: Not on file Gets together: Not on file     Attends [employer] service: Not on file     Active member of club or organization: Not on file     Attends meetings of clubs or organizations: Not on file     Relationship status: Not on file    Intimate partner violence     Fear of current or ex partner: Not on file     Emotionally abused: Not on file     Physically abused: Not on file     Forced sexual activity: Not on file   Other Topics Concern    Not on file   Social History Narrative    Not on file       Current Outpatient Medications:     ASPIRIN 81 PO, Take 81 mg by mouth daily , Disp: , Rfl:     atorvastatin (LIPITOR) 10 mg tablet, Take 1 tablet (10 mg total) by mouth daily, Disp: 90 tablet, Rfl: 2    colestipol (COLESTID) 1 g tablet, Take 2 tablets (2 g total) by mouth 2 (two) times a day, Disp: 60 tablet, Rfl: 5    escitalopram (Lexapro) 20 mg tablet, Take 0 5 tablets (10 mg total) by mouth daily, Disp: 90 tablet, Rfl: 0    fenofibrate (TRIGLIDE) 160 MG tablet, Take 1 tablet (160 mg total) by mouth daily, Disp: 90 tablet, Rfl: 3    furosemide (LASIX) 20 mg tablet, Take 1 tablet (20 mg total) by mouth daily, Disp: 90 tablet, Rfl: 2    Loratadine 10 MG CAPS, loratadine 10 mg capsule  Take by oral route , Disp: , Rfl:     Misc Natural Products (OSTEO BI-FLEX ADV DOUBLE ST PO), Take by mouth, Disp: , Rfl:     omeprazole (PriLOSEC) 20 mg delayed release capsule, 20 mg daily , Disp: , Rfl:     Potassium Chloride ER 20 MEQ TBCR, Take 1 tablet (20 mEq total) by mouth daily, Disp: 90 tablet, Rfl: 3    TURMERIC PO, Take by mouth, Disp: , Rfl:   Allergies   Allergen Reactions    Codeine Rash     There were no vitals filed for this visit  Physical Exam  Constitutional: General appearance: The Patient is well-developed and well-nourished who appears the stated age in no acute distress  Patient is pleasant and talkative  HEENT:  Normocephalic  Sclerae are anicteric   Mucous membranes are moist     Abdomen: Abdomen is soft, non-tender, non-distended and without masses  Incisions C/D/I     Extremities: There is no clubbing or cyanosis  There is no edema  Symmetric  Neuro: Grossly nonfocal  Gait is normal      Skin: Warm, anicteric  Psych:  Patient is pleasant and talkative  Breasts:        Pathology:  Final Diagnosis   A  Terminal ileum, appendix, right colon (resection):  - Tubular adenoma (3 cm)  - No high grade dysplasia   - Benign appendix  - Twenty-six (26) lymph nodes negative for carcinoma (0/26)  - Margins negative for dysplasia    Electronically signed by Darien Marie MD on 12/17/2020 at  4:37 PM         Labs:      Imaging  No results found  I reviewed the above laboratory and imaging data  Discussion/Summary:  72-year-old female status post laparoscopic right hemicolectomy  Her final pathology was benign  Since her polypectomy pathology was negative and the excisional biopsy was negative, this was a benign lesion  She will continue to follow up with Gastroenterology for repeat colonoscopy in the next year  If her diarrhea becomes worse she will contact us  I did discuss that she can continue with Questran or start Imodium as needed  I will see her again in the future should the need arise  She will follow-up with thoracic surgery regarding her lung nodule  She is agreeable to this  All her questions were answered

## 2021-01-11 ENCOUNTER — OFFICE VISIT (OUTPATIENT)
Dept: INTERNAL MEDICINE CLINIC | Facility: CLINIC | Age: 66
End: 2021-01-11
Payer: COMMERCIAL

## 2021-01-11 ENCOUNTER — TELEPHONE (OUTPATIENT)
Dept: INTERNAL MEDICINE CLINIC | Facility: CLINIC | Age: 66
End: 2021-01-11

## 2021-01-11 DIAGNOSIS — I10 ESSENTIAL HYPERTENSION: ICD-10-CM

## 2021-01-11 DIAGNOSIS — D12.0 ADENOMA OF CECUM: Primary | ICD-10-CM

## 2021-01-11 PROCEDURE — 1036F TOBACCO NON-USER: CPT | Performed by: INTERNAL MEDICINE

## 2021-01-11 PROCEDURE — 1160F RVW MEDS BY RX/DR IN RCRD: CPT | Performed by: INTERNAL MEDICINE

## 2021-01-11 PROCEDURE — 99213 OFFICE O/P EST LOW 20 MIN: CPT | Performed by: INTERNAL MEDICINE

## 2021-01-11 NOTE — PROGRESS NOTES
Virtual Regular Visit      Assessment/Plan:    Problem List Items Addressed This Visit        Digestive    Adenoma of cecum - Primary       Cardiovascular and Mediastinum    Hypertension               Reason for visit is   Chief Complaint   Patient presents with    Follow-up     from Dr Lolis Luis Virtual Regular Visit        Encounter provider Frankey Cheers, MD    Provider located at [address] [place] [address]      Recent Visits  No visits were found meeting these conditions  Showing recent visits within past 7 days and meeting all other requirements     Today's Visits  Date Type Provider Dept   01/11/21 Office Visit Frankey Cheers, MD [address] [place] [address] today's visits and meeting all other requirements     Future Appointments  No visits were found meeting these conditions  Showing future appointments within next 150 days and meeting all other requirements        The patient was identified by name and date of birth  David Jay was informed that this is a telemedicine visit and that the visit is being conducted through [employer] and patient was informed that this is not a secure, HIPAA-compliant platform  She agrees to proceed     My office door was closed  No one else was in the room  She acknowledged consent and understanding of privacy and security of the video platform  The patient has agreed to participate and understands they can discontinue the visit at any time  Patient is aware this is a billable service  Subjective  David Jay is a 72 y o  female    S/p right hemicolectomy for cecal tumor which was an adenoma  No problem during the procedure      Diarrhea has resolved    BP controlled    No sxs       Past Medical History:   Diagnosis Date    Depression     Hyperlipidemia     Hypertension     Nodule of lower lobe of right lung        Past Surgical History: Procedure Laterality Date    CHOLECYSTECTOMY      COLONOSCOPY      [address]/ ANASTOMOSIS N/A 12/14/2020    Procedure: LAPAROSCOPIC HAND-ASSISTED RIGHT HEMICOLECTOMY;  Surgeon: Kasie Adam MD;  [facility];  Service: Surgical Oncology    HYSTERECTOMY      partial       Current Outpatient Medications   Medication Sig Dispense Refill    atorvastatin (LIPITOR) 10 mg tablet Take 1 tablet (10 mg total) by mouth daily 90 tablet 2    colestipol (COLESTID) 1 g tablet Take 2 tablets (2 g total) by mouth 2 (two) times a day 60 tablet 5    escitalopram (Lexapro) 20 mg tablet Take 0 5 tablets (10 mg total) by mouth daily 90 tablet 0    fenofibrate (TRIGLIDE) 160 MG tablet Take 1 tablet (160 mg total) by mouth daily 90 tablet 3    furosemide (LASIX) 20 mg tablet Take 1 tablet (20 mg total) by mouth daily 90 tablet 2    Loratadine 10 MG CAPS loratadine 10 mg capsule   Take by oral route   Misc Natural Products (OSTEO BI-FLEX ADV DOUBLE ST PO) Take by mouth      omeprazole (PriLOSEC) 20 mg delayed release capsule 20 mg daily       Potassium Chloride ER 20 MEQ TBCR Take 1 tablet (20 mEq total) by mouth daily 90 tablet 3    TURMERIC PO Take by mouth      ASPIRIN 81 PO Take 81 mg by mouth daily        No current facility-administered medications for this visit  Allergies   Allergen Reactions    Codeine Rash       Review of Systems   Constitutional: Negative for chills and fever  HENT: Negative for ear pain and sore throat  Eyes: Negative for pain and visual disturbance  Respiratory: Negative for cough and shortness of breath  Cardiovascular: Negative for chest pain and palpitations  Gastrointestinal: Negative for abdominal pain and vomiting  Genitourinary: Negative for dysuria and hematuria  Musculoskeletal: Negative for arthralgias and back pain  Skin: Negative for color change and rash  Neurological: Negative for seizures and syncope     All other systems reviewed and are negative  Video Exam    There were no vitals filed for this visit  Physical Exam  Constitutional:       Appearance: Normal appearance  HENT:      Head: Normocephalic and atraumatic  Neck:      Musculoskeletal: Normal range of motion  Pulmonary:      Effort: Pulmonary effort is normal    Neurological:      General: No focal deficit present  Psychiatric:         Mood and Affect: Mood normal          Judgment: Judgment normal           I spent 5 minutes directly with the patient during this visit      Dale3 Samantha Scanlon acknowledges that she has consented to an online visit or consultation  She understands that the online visit is based solely on information provided by her, and that, in the absence of a face-to-face physical evaluation by the physician, the diagnosis she receives is both limited and provisional in terms of accuracy and completeness  This is not intended to replace a full medical face-to-face evaluation by the physician  Matt Michel understands and accepts these terms

## 2021-01-18 ENCOUNTER — TELEPHONE (OUTPATIENT)
Dept: INTERNAL MEDICINE CLINIC | Facility: CLINIC | Age: 66
End: 2021-01-18

## 2021-01-18 DIAGNOSIS — R12 HEARTBURN: Primary | ICD-10-CM

## 2021-01-18 DIAGNOSIS — I10 ESSENTIAL HYPERTENSION: ICD-10-CM

## 2021-01-18 DIAGNOSIS — R12 HEARTBURN: ICD-10-CM

## 2021-01-18 DIAGNOSIS — I10 ESSENTIAL HYPERTENSION: Primary | ICD-10-CM

## 2021-01-18 DIAGNOSIS — E78.2 MIXED HYPERLIPIDEMIA: ICD-10-CM

## 2021-01-18 RX ORDER — ATORVASTATIN CALCIUM 10 MG/1
10 TABLET, FILM COATED ORAL DAILY
Qty: 90 TABLET | Refills: 2 | Status: SHIPPED | OUTPATIENT
Start: 2021-01-18 | End: 2021-06-19 | Stop reason: SDUPTHER

## 2021-01-18 RX ORDER — FUROSEMIDE 20 MG/1
20 TABLET ORAL DAILY
Qty: 90 TABLET | Refills: 2 | Status: SHIPPED | OUTPATIENT
Start: 2021-01-18 | End: 2021-06-21 | Stop reason: SDUPTHER

## 2021-01-18 RX ORDER — OMEPRAZOLE 20 MG/1
20 CAPSULE, DELAYED RELEASE ORAL DAILY
Qty: 90 CAPSULE | Refills: 2 | Status: SHIPPED | OUTPATIENT
Start: 2021-01-18 | End: 2021-10-11

## 2021-01-18 NOTE — TELEPHONE ENCOUNTER
PT NEEDS REFILLS SENT TO [facility]    OMEPROZOL 40MG   90 DAY  FUROSEMIDE 20 MG   90 DAY  ATORVASTAIN 10 MG   90 DAY    ANY PROBS - CALL PT

## 2021-01-19 RX ORDER — OMEPRAZOLE 20 MG/1
CAPSULE, DELAYED RELEASE ORAL
Qty: 30 CAPSULE | Refills: 0 | Status: SHIPPED | OUTPATIENT
Start: 2021-01-19 | End: 2021-02-20 | Stop reason: SDUPTHER

## 2021-01-19 RX ORDER — LOSARTAN POTASSIUM 50 MG/1
TABLET ORAL
Qty: 90 TABLET | Refills: 0 | Status: SHIPPED | OUTPATIENT
Start: 2021-01-19 | End: 2021-04-14

## 2021-02-04 ENCOUNTER — TELEPHONE (OUTPATIENT)
Dept: GASTROENTEROLOGY | Facility: CLINIC | Age: 66
End: 2021-02-04

## 2021-02-04 DIAGNOSIS — R19.4 CHANGE IN BOWEL HABITS: ICD-10-CM

## 2021-02-04 DIAGNOSIS — R19.7 DIARRHEA, UNSPECIFIED TYPE: Primary | ICD-10-CM

## 2021-02-04 NOTE — TELEPHONE ENCOUNTER
She needs to do a stool for C diff toxin and for enteric pathogens right away and then call me after 24 hours for the results

## 2021-02-12 ENCOUNTER — HOSPITAL ENCOUNTER (OUTPATIENT)
Dept: RADIOLOGY | Facility: HOSPITAL | Age: 66
Discharge: HOME | End: 2021-02-12
Payer: COMMERCIAL

## 2021-02-12 ENCOUNTER — TRANSCRIBE ORDERS (OUTPATIENT)
Dept: ADMINISTRATIVE | Facility: HOSPITAL | Age: 66
End: 2021-02-12

## 2021-02-12 ENCOUNTER — HOSPITAL ENCOUNTER (OUTPATIENT)
Dept: CT IMAGING | Facility: HOSPITAL | Age: 66
Discharge: HOME | End: 2021-02-12
Attending: THORACIC SURGERY (CARDIOTHORACIC VASCULAR SURGERY)
Payer: COMMERCIAL

## 2021-02-12 DIAGNOSIS — M20.11 ACQUIRED HALLUX VALGUS OF RIGHT FOOT: ICD-10-CM

## 2021-02-12 DIAGNOSIS — M20.11 ACQUIRED HALLUX VALGUS OF RIGHT FOOT: Primary | ICD-10-CM

## 2021-02-12 DIAGNOSIS — R91.1 PULMONARY NODULE: ICD-10-CM

## 2021-02-12 PROCEDURE — G1004 CDSM NDSC: HCPCS

## 2021-02-12 PROCEDURE — 71250 CT THORAX DX C-: CPT

## 2021-02-12 PROCEDURE — 73630 X-RAY EXAM OF FOOT: CPT

## 2021-02-16 ENCOUNTER — TELEMEDICINE (OUTPATIENT)
Dept: CARDIAC SURGERY | Facility: CLINIC | Age: 66
End: 2021-02-16
Payer: COMMERCIAL

## 2021-02-16 ENCOUNTER — TELEPHONE (OUTPATIENT)
Dept: CARDIAC SURGERY | Facility: CLINIC | Age: 66
End: 2021-02-16

## 2021-02-16 DIAGNOSIS — R91.1 NODULE OF LOWER LOBE OF RIGHT LUNG: ICD-10-CM

## 2021-02-16 DIAGNOSIS — R91.1 PULMONARY NODULE: Primary | ICD-10-CM

## 2021-02-16 PROCEDURE — 99213 OFFICE O/P EST LOW 20 MIN: CPT | Performed by: THORACIC SURGERY (CARDIOTHORACIC VASCULAR SURGERY)

## 2021-02-16 NOTE — PROGRESS NOTES
Virtual Regular Visit      Assessment/Plan:    Problem List Items Addressed This Visit        Other    Nodule of lower lobe of right lung     Ms Yoana Coburn is a very pleasant 70-year-old female who is following with me for a right lower lobe nodule  I personally reviewed her CT scan in PACS and this demonstrates stability of the nodule  This may be consistent with a benign process or it may be an indolent type tumor such as a carcinoid based upon the morphology  At this time, I offered the patient to continue watching it or to get a biopsy  It is in a difficult location for biopsy but it likely would be possible  At this time, the patient feels comfortable following it and I agree with this plan  I feel that this is safe  We will repeat a CT scan in 6 months to ensure stability and she will come see me at that time  Venancio Benson MD  Thoracic Surgery  (Available on [employer])  Office: [phone]             Other Visit Diagnoses     Pulmonary nodule    -  Primary    Relevant Orders    CT chest wo contrast               Reason for visit is   Chief Complaint   Patient presents with    Virtual Regular Visit        Encounter provider Alfredo Gonzales MD    Provider located at [address] [place] [id number] [phone]      Recent Visits  No visits were found meeting these conditions  Showing recent visits within past 7 days and meeting all other requirements     Today's Visits  Date Type Provider Dept   02/16/21 Telemedicine Alfredo Gonzales MD  Thoracic Surg [facility]   Showing today's visits and meeting all other requirements     Future Appointments  No visits were found meeting these conditions  Showing future appointments within next 150 days and meeting all other requirements        The patient was identified by name and date of birth   Prabhakar Reed rosalia informed that this is a telemedicine visit and that the visit is being conducted through [facility] and patient was informed that this is a secure, HIPAA-compliant platform  She agrees to proceed     My office door was closed  No one else was in the room  She acknowledged consent and understanding of privacy and security of the video platform  The patient has agreed to participate and understands they can discontinue the visit at any time  Patient is aware this is a billable service  Ines Andre is a 72 y o  female     She is known to me for a right lower lobe 1 3 centimetre nodule  She presents for 3 month follow-up  Today during the visit, she states she is doing well  She underwent a laparoscopic right hemicolectomy in December and she is recovering nicely  She does report that she is still having some diarrhea and she is working with her gastroenterologist for this  She is otherwise doing well  She denies any shortness of breath or chest pain  She denies a cough or hemoptysis  She denies any unexplained weight loss  She denies any symptoms of new headaches, vision loss, seizures or syncope  I personally reviewed all of the available and relevant imaging, including the most recent  chest CT, in PACS and agree with the findings as included above      HPI     Past Medical History:   Diagnosis Date    Depression     Hyperlipidemia     Hypertension     Nodule of lower lobe of right lung        Past Surgical History:   Procedure Laterality Date    CHOLECYSTECTOMY      COLONOSCOPY      HEMICOLOECTOMY W/ ANASTOMOSIS N/A 12/14/2020    Procedure: LAPAROSCOPIC HAND-ASSISTED RIGHT HEMICOLECTOMY;  Surgeon: Shelby Vela MD;  [facility];  Service: Surgical Oncology    HYSTERECTOMY      partial       Current Outpatient Medications   Medication Sig Dispense Refill    ASPIRIN 81 PO Take 81 mg by mouth daily       atorvastatin (LIPITOR) 10 mg tablet Take 1 tablet (10 mg total) by mouth daily 90 tablet 2    colestipol (COLESTID) 1 g tablet Take 2 tablets (2 g total) by mouth 2 (two) times a day 60 tablet 5    escitalopram (Lexapro) 20 mg tablet Take 0 5 tablets (10 mg total) by mouth daily 90 tablet 0    fenofibrate (TRIGLIDE) 160 MG tablet Take 1 tablet (160 mg total) by mouth daily 90 tablet 3    furosemide (LASIX) 20 mg tablet Take 1 tablet (20 mg total) by mouth daily 90 tablet 2    Loratadine 10 MG CAPS loratadine 10 mg capsule   Take by oral route   losartan (COZAAR) 50 mg tablet TAKE ONE TABLET BY MOUTH EVERY DAY  90 tablet 0    Misc Natural Products (OSTEO BI-FLEX ADV DOUBLE ST PO) Take by mouth      omeprazole (PriLOSEC) 20 mg delayed release capsule Take 1 capsule (20 mg total) by mouth daily 90 capsule 2    omeprazole (PriLOSEC) 20 mg delayed release capsule TAKE ONE CAPSULE BY MOUTH ONCE A DAY  30 capsule 0    Potassium Chloride ER 20 MEQ TBCR Take 1 tablet (20 mEq total) by mouth daily 90 tablet 3    TURMERIC PO Take by mouth       No current facility-administered medications for this visit  Allergies   Allergen Reactions    Codeine Rash       Review of Systems   Constitutional: Negative for chills, fatigue, fever and unexpected weight change  HENT: Negative  Eyes: Negative  Negative for visual disturbance  Respiratory: Negative for cough, shortness of breath and stridor  Cardiovascular: Negative for chest pain  Gastrointestinal: Positive for diarrhea  Endocrine: Negative  Genitourinary: Negative  Musculoskeletal: Negative  Skin: Negative  Neurological: Negative for dizziness, light-headedness and headaches  Hematological: Negative for adenopathy  Psychiatric/Behavioral: Negative  Video Exam    There were no vitals filed for this visit  Physical Exam  Constitutional:       General: She is not in acute distress  Appearance: She is well-developed  She is not diaphoretic     HENT:      Head: Normocephalic and atraumatic  Nose: Nose normal       Mouth/Throat:      Mouth: Mucous membranes are moist       Pharynx: Oropharynx is clear  Eyes:      General: No scleral icterus  Conjunctiva/sclera: Conjunctivae normal    Neck:      Musculoskeletal: Normal range of motion  Cardiovascular:      Rate and Rhythm: Normal rate  Pulmonary:      Effort: Pulmonary effort is normal  No respiratory distress  Breath sounds: No stridor  Skin:     Coloration: Skin is not pale  Findings: No erythema or rash  Neurological:      Mental Status: She is alert and oriented to person, place, and time  Psychiatric:         Behavior: Behavior normal          Thought Content: Thought content normal          Judgment: Judgment normal           I spent 20 minutes directly with the patient during this visit      Ragini Scanlon acknowledges that she has consented to an online visit or consultation  She understands that the online visit is based solely on information provided by her, and that, in the absence of a face-to-face physical evaluation by the physician, the diagnosis she receives is both limited and provisional in terms of accuracy and completeness  This is not intended to replace a full medical face-to-face evaluation by the physician  Mark Beard understands and accepts these terms

## 2021-02-16 NOTE — ASSESSMENT & PLAN NOTE
Ms Evon Bull is a very pleasant 51-year-old female who is following with me for a right lower lobe nodule  I personally reviewed her CT scan in PACS and this demonstrates stability of the nodule  This may be consistent with a benign process or it may be an indolent type tumor such as a carcinoid based upon the morphology  At this time, I offered the patient to continue watching it or to get a biopsy  It is in a difficult location for biopsy but it likely would be possible  At this time, the patient feels comfortable following it and I agree with this plan  I feel that this is safe  We will repeat a CT scan in 6 months to ensure stability and she will come see me at that time      Ino Arellano MD  Thoracic Surgery  (Available on [employer])  Office: [phone]

## 2021-02-20 ENCOUNTER — HOSPITAL ENCOUNTER (EMERGENCY)
Facility: HOSPITAL | Age: 66
Discharge: HOME | End: 2021-02-20
Attending: EMERGENCY MEDICINE | Admitting: EMERGENCY MEDICINE
Payer: COMMERCIAL

## 2021-02-20 ENCOUNTER — APPOINTMENT (EMERGENCY)
Dept: RADIOLOGY | Facility: HOSPITAL | Age: 66
End: 2021-02-20
Payer: COMMERCIAL

## 2021-02-20 VITALS
WEIGHT: 176.15 LBS | HEART RATE: 70 BPM | HEIGHT: 61 IN | DIASTOLIC BLOOD PRESSURE: 74 MMHG | BODY MASS INDEX: 33.26 KG/M2 | SYSTOLIC BLOOD PRESSURE: 165 MMHG | TEMPERATURE: 97.9 F | OXYGEN SATURATION: 99 % | RESPIRATION RATE: 15 BRPM

## 2021-02-20 DIAGNOSIS — M25.519 SHOULDER PAIN: Primary | ICD-10-CM

## 2021-02-20 LAB
ANION GAP SERPL CALCULATED.3IONS-SCNC: 10 MMOL/L (ref 4–13)
BASOPHILS # BLD AUTO: 0.06 THOUSANDS/ΜL (ref 0–0.1)
BASOPHILS NFR BLD AUTO: 1 % (ref 0–1)
BUN SERPL-MCNC: 12 MG/DL (ref 5–25)
CALCIUM SERPL-MCNC: 8.9 MG/DL (ref 8.3–10.1)
CHLORIDE SERPL-SCNC: 105 MMOL/L (ref 100–108)
CO2 SERPL-SCNC: 26 MMOL/L (ref 21–32)
CREAT SERPL-MCNC: 0.83 MG/DL (ref 0.6–1.3)
EOSINOPHIL # BLD AUTO: 0.25 THOUSAND/ΜL (ref 0–0.61)
EOSINOPHIL NFR BLD AUTO: 3 % (ref 0–6)
ERYTHROCYTE [DISTWIDTH] IN BLOOD BY AUTOMATED COUNT: 12.7 % (ref 11.6–15.1)
GFR SERPL CREATININE-BSD FRML MDRD: 74 ML/MIN/1.73SQ M
GLUCOSE SERPL-MCNC: 95 MG/DL (ref 65–140)
HCT VFR BLD AUTO: 35.3 % (ref 34.8–46.1)
HGB BLD-MCNC: 11.8 G/DL (ref 11.5–15.4)
IMM GRANULOCYTES # BLD AUTO: 0.04 THOUSAND/UL (ref 0–0.2)
IMM GRANULOCYTES NFR BLD AUTO: 1 % (ref 0–2)
LYMPHOCYTES # BLD AUTO: 2.46 THOUSANDS/ΜL (ref 0.6–4.47)
LYMPHOCYTES NFR BLD AUTO: 29 % (ref 14–44)
MCH RBC QN AUTO: 29.7 PG (ref 26.8–34.3)
MCHC RBC AUTO-ENTMCNC: 33.4 G/DL (ref 31.4–37.4)
MCV RBC AUTO: 89 FL (ref 82–98)
MONOCYTES # BLD AUTO: 0.63 THOUSAND/ΜL (ref 0.17–1.22)
MONOCYTES NFR BLD AUTO: 8 % (ref 4–12)
NEUTROPHILS # BLD AUTO: 4.95 THOUSANDS/ΜL (ref 1.85–7.62)
NEUTS SEG NFR BLD AUTO: 58 % (ref 43–75)
NRBC BLD AUTO-RTO: 0 /100 WBCS
PLATELET # BLD AUTO: 257 THOUSANDS/UL (ref 149–390)
PMV BLD AUTO: 9.7 FL (ref 8.9–12.7)
POTASSIUM SERPL-SCNC: 4 MMOL/L (ref 3.5–5.3)
RBC # BLD AUTO: 3.97 MILLION/UL (ref 3.81–5.12)
SODIUM SERPL-SCNC: 141 MMOL/L (ref 136–145)
TROPONIN I SERPL-MCNC: <0.02 NG/ML
WBC # BLD AUTO: 8.39 THOUSAND/UL (ref 4.31–10.16)

## 2021-02-20 PROCEDURE — 93005 ELECTROCARDIOGRAM TRACING: CPT

## 2021-02-20 PROCEDURE — 36415 COLL VENOUS BLD VENIPUNCTURE: CPT | Performed by: EMERGENCY MEDICINE

## 2021-02-20 PROCEDURE — 73030 X-RAY EXAM OF SHOULDER: CPT

## 2021-02-20 PROCEDURE — 99285 EMERGENCY DEPT VISIT HI MDM: CPT | Performed by: EMERGENCY MEDICINE

## 2021-02-20 PROCEDURE — 84484 ASSAY OF TROPONIN QUANT: CPT | Performed by: EMERGENCY MEDICINE

## 2021-02-20 PROCEDURE — 99284 EMERGENCY DEPT VISIT MOD MDM: CPT

## 2021-02-20 PROCEDURE — 85025 COMPLETE CBC W/AUTO DIFF WBC: CPT | Performed by: EMERGENCY MEDICINE

## 2021-02-20 PROCEDURE — 80048 BASIC METABOLIC PNL TOTAL CA: CPT | Performed by: EMERGENCY MEDICINE

## 2021-02-20 RX ORDER — NAPROXEN 500 MG/1
500 TABLET ORAL 2 TIMES DAILY WITH MEALS
Qty: 30 TABLET | Refills: 0 | Status: SHIPPED | OUTPATIENT
Start: 2021-02-20 | End: 2022-02-07 | Stop reason: SDUPTHER

## 2021-02-20 RX ORDER — ACETAMINOPHEN 325 MG/1
650 TABLET ORAL ONCE
Status: COMPLETED | OUTPATIENT
Start: 2021-02-20 | End: 2021-02-20

## 2021-02-20 RX ADMIN — ACETAMINOPHEN 650 MG: 325 TABLET ORAL at 13:16

## 2021-02-20 NOTE — DISCHARGE INSTRUCTIONS
Can take naproxen up to two times daily as needed in addition to tylenol over the counter   Follow up with orthopedics for continued pain

## 2021-02-20 NOTE — ED PROVIDER NOTES
History  Chief Complaint   Patient presents with    Shoulder Pain     Patient c/o left shoulder pain raidating down to left arm  Denies injury  Denies CP/SOB  HPI  73 yo F presents with left shoulder pain for the last few days  Today pain started to radiate down arm  Pain is aching and constant  No known injury  No chest pain or shortness of breath  No weakness or numbness  Prior to Admission Medications   Prescriptions Last Dose Informant Patient Reported? Taking? ASPIRIN 81 PO 2/20/2021 at 0730 Self Yes Yes   Sig: Take 81 mg by mouth daily    Loratadine 10 MG CAPS More than a month at Unknown time Self Yes No   Sig: loratadine 10 mg capsule   Take by oral route     Misc Natural Products (OSTEO BI-FLEX ADV DOUBLE ST PO) 2/20/2021 at 0730 Self Yes Yes   Sig: Take by mouth   Potassium Chloride ER 20 MEQ TBCR 2/20/2021 at 0730 Self No Yes   Sig: Take 1 tablet (20 mEq total) by mouth daily   TURMERIC PO 2/20/2021 at 0730 Self Yes Yes   Sig: Take by mouth   atorvastatin (LIPITOR) 10 mg tablet 2/20/2021 at 0730  No Yes   Sig: Take 1 tablet (10 mg total) by mouth daily   colestipol (COLESTID) 1 g tablet Past Month at Unknown time Self No Yes   Sig: Take 2 tablets (2 g total) by mouth 2 (two) times a day   escitalopram (Lexapro) 20 mg tablet 2/20/2021 at 0730 Self No Yes   Sig: Take 0 5 tablets (10 mg total) by mouth daily   fenofibrate (TRIGLIDE) 160 MG tablet 2/20/2021 at 0730 Self No Yes   Sig: Take 1 tablet (160 mg total) by mouth daily   furosemide (LASIX) 20 mg tablet 2/20/2021 at 0730  No Yes   Sig: Take 1 tablet (20 mg total) by mouth daily   losartan (COZAAR) 50 mg tablet 2/20/2021 at 0730  No Yes   Sig: TAKE ONE TABLET BY MOUTH EVERY DAY    omeprazole (PriLOSEC) 20 mg delayed release capsule 2/20/2021 at 0730  No Yes   Sig: Take 1 capsule (20 mg total) by mouth daily      Facility-Administered Medications: None       Past Medical History:   Diagnosis Date    Depression     Hyperlipidemia     Hypertension     Nodule of lower lobe of right lung        Past Surgical History:   Procedure Laterality Date    CHOLECYSTECTOMY      COLONOSCOPY      HEMICOLOECTOMY W/ ANASTOMOSIS N/A 2020    Procedure: LAPAROSCOPIC HAND-ASSISTED RIGHT HEMICOLECTOMY;  Surgeon: Tiki Arevalo MD;  [facility];  Service: Surgical Oncology    HYSTERECTOMY      partial       Family History   Problem Relation Age of Onset    Throat cancer Mother     Heart disease Father      I have reviewed and agree with the history as documented  E-Cigarette/Vaping    E-Cigarette Use Never User      E-Cigarette/Vaping Substances    Nicotine No     THC No     CBD No     Flavoring No     Other No     Unknown No      Social History     Tobacco Use    Smoking status: Former Smoker     Packs/day: 0 50     Years: 52 00     Pack years: 26 00     Types: Cigarettes     Quit date: 2020     Years since quittin 1    Smokeless tobacco: Never Used   Substance Use Topics    Alcohol use: Not Currently    Drug use: Never       Review of Systems   Constitutional: Negative for chills and fever  HENT: Negative for dental problem and ear pain  Eyes: Negative for pain and redness  Respiratory: Negative for cough and shortness of breath  Cardiovascular: Negative for chest pain and palpitations  Gastrointestinal: Negative for abdominal pain and nausea  Endocrine: Negative for polydipsia and polyphagia  Genitourinary: Negative for dysuria and frequency  Musculoskeletal: Positive for arthralgias  Negative for joint swelling  Skin: Negative for color change and rash  Neurological: Negative for dizziness and headaches  Psychiatric/Behavioral: Negative for behavioral problems and confusion  All other systems reviewed and are negative  Physical Exam  Physical Exam  Vitals signs and nursing note reviewed  Constitutional:       General: She is not in acute distress  Appearance: She is well-developed  She is not diaphoretic  HENT:      Head: Normocephalic and atraumatic  Right Ear: External ear normal       Left Ear: External ear normal    Eyes:      General: No scleral icterus  Right eye: No discharge  Left eye: No discharge  Conjunctiva/sclera: Conjunctivae normal       Pupils: Pupils are equal, round, and reactive to light  Neck:      Musculoskeletal: Normal range of motion and neck supple  Vascular: No JVD  Cardiovascular:      Rate and Rhythm: Normal rate  Pulmonary:      Effort: Pulmonary effort is normal    Musculoskeletal: Normal range of motion  General: No deformity  Comments: TTP left posterior shoulder, normal ROM, n/v intact distally, normal strength   Skin:     General: Skin is dry  Findings: No erythema  Neurological:      Mental Status: She is alert and oriented to person, place, and time           Vital Signs  ED Triage Vitals   Temperature Pulse Respirations Blood Pressure SpO2   02/20/21 1203 02/20/21 1203 02/20/21 1203 02/20/21 1206 02/20/21 1203   97 9 °F (36 6 °C) 95 20 140/91 98 %      Temp Source Heart Rate Source Patient Position - Orthostatic VS BP Location FiO2 (%)   02/20/21 1203 02/20/21 1203 02/20/21 1203 02/20/21 1203 --   Oral Monitor Lying Right arm       Pain Score       02/20/21 1203       8           Vitals:    02/20/21 1215 02/20/21 1230 02/20/21 1300 02/20/21 1330   BP: 140/91 (!) 188/79 (!) 171/79 165/74   Pulse: 80 79 73 70   Patient Position - Orthostatic VS: Lying Lying Lying Lying         Visual Acuity      ED Medications  Medications   acetaminophen (TYLENOL) tablet 650 mg (650 mg Oral Given 2/20/21 1316)       Diagnostic Studies  Results Reviewed     Procedure Component Value Units Date/Time    Troponin I [[id number]]  (Normal) Collected: 02/20/21 1231    Lab Status: Final result Specimen: Blood from Arm, Left Updated: 02/20/21 1254     Troponin I <0 02 ng/mL     Basic metabolic panel [[id number]] Collected: 02/20/21 1231    Lab Status: Final result Specimen: Blood from Arm, Left Updated: 02/20/21 1245     Sodium 141 mmol/L      Potassium 4 0 mmol/L      Chloride 105 mmol/L      CO2 26 mmol/L      ANION GAP 10 mmol/L      BUN 12 mg/dL      Creatinine 0 83 mg/dL      Glucose 95 mg/dL      Calcium 8 9 mg/dL      eGFR 74 ml/min/1 73sq m     Narrative:      [place] guidelines for Chronic Kidney Disease (CKD):     Stage 1 with normal or high GFR (GFR > 90 mL/min/1 73 square meters)    Stage 2 Mild CKD (GFR = 60-89 mL/min/1 73 square meters)    Stage 3A Moderate CKD (GFR = 45-59 mL/min/1 73 square meters)    Stage 3B Moderate CKD (GFR = 30-44 mL/min/1 73 square meters)    Stage 4 Severe CKD (GFR = 15-29 mL/min/1 73 square meters)    Stage 5 End Stage CKD (GFR <15 mL/min/1 73 square meters)  Note: GFR calculation is accurate only with a steady state creatinine    CBC and differential [[id number]] Collected: 02/20/21 1231    Lab Status: Final result Specimen: Blood from Arm, Left Updated: 02/20/21 1237     WBC 8 39 Thousand/uL      RBC 3 97 Million/uL      Hemoglobin 11 8 g/dL      Hematocrit 35 3 %      MCV 89 fL      MCH 29 7 pg      MCHC 33 4 g/dL      RDW 12 7 %      MPV 9 7 fL      Platelets 785 Thousands/uL      nRBC 0 /100 WBCs      Neutrophils Relative 58 %      Immat GRANS % 1 %      Lymphocytes Relative 29 %      Monocytes Relative 8 %      Eosinophils Relative 3 %      Basophils Relative 1 %      Neutrophils Absolute 4 95 Thousands/µL      Immature Grans Absolute 0 04 Thousand/uL      Lymphocytes Absolute 2 46 Thousands/µL      Monocytes Absolute 0 63 Thousand/µL      Eosinophils Absolute 0 25 Thousand/µL      Basophils Absolute 0 06 Thousands/µL                  XR shoulder 2+ views LEFT    (Results Pending)              Procedures  ECG 12 Lead Documentation Only    Date/Time: 2/20/2021 12:25 PM  Performed by: Laureano Blake MD  Authorized by: Laureano Blake MD     Comments: Sinus rhythm rate of 94 with PVC's, normal axis and intervals no acute ST elevations or depressions              ED Course               Identification of Seniors at Risk      Most Recent Value   (ISAR) Identification of Seniors at Risk   Before the illness or injury that brought you to the Emergency, did you need someone to help you on a regular basis? 0 Filed at: 02/20/2021 1206   In the last 24 hours, have you needed more help than usual?  0 Filed at: 02/20/2021 1206   Have you been hospitalized for one or more nights during the past 6 months? 0 Filed at: 02/20/2021 1206   In general, do you see well?  0 Filed at: 02/20/2021 1206   In general, do you have serious problems with your memory? 0 Filed at: 02/20/2021 1206   Do you take more than three different medications every day? 1 Filed at: 02/20/2021 1206   ISAR Score  1 Filed at: 02/20/2021 1206                    SBIRT 22yo+      Most Recent Value   SBIRT (25 yo +)   In order to provide better care to our patients, we are screening all of our patients for alcohol and drug use  Would it be okay to ask you these screening questions? Yes Filed at: 02/20/2021 1206   Initial Alcohol Screen: US AUDIT-C    1  How often do you have a drink containing alcohol?  0 Filed at: 02/20/2021 1206   2  How many drinks containing alcohol do you have on a typical day you are drinking? 0 Filed at: 02/20/2021 1206   3b  FEMALE Any Age, or MALE 65+: How often do you have 4 or more drinks on one occassion? 0 Filed at: 02/20/2021 1206   Audit-C Score  0 Filed at: 02/20/2021 1206   RAEGAN: How many times in the past year have you    Used an illegal drug or used a prescription medication for non-medical reasons? Never Filed at: 02/20/2021 1206                    MDM  Left shoulder pain, no chest pain, EKG and trop negative doubt acs  XR shows no acute fractures or dislocation per my read   Discussed tylenol for pain control and patient requests to take naproxen also as this has helped in the past and she declines topical voltaren gel  Discussed using sparingly  Disposition  Final diagnoses:   Shoulder pain     Time reflects when diagnosis was documented in both MDM as applicable and the Disposition within this note     Time User Action Codes Description Comment    2/20/2021  1:43 PM Ratna Rodriguez Add [J20 916] Shoulder pain       ED Disposition     ED Disposition Condition Date/Time Comment    Discharge Stable Sat Feb 20, 2021  1:42 PM Chetnahernando Murray discharge to home/self care  Follow-up Information     Follow up With Specialties Details Why Contact Info Additional [address] [facility] Surgery Call  for orthopedic follow up [address] [facility] [address] [phone]  [address] [facility], [address], [place], [facility], [address] [facility]    Leah Richardson MD Internal Medicine  As needed [address] [place] [address]  [phone]             Patient's Medications   Discharge Prescriptions    NAPROXEN (NAPROSYN) 500 MG TABLET    Take 1 tablet (500 mg total) by mouth 2 (two) times a day with meals       Start Date: 2/20/2021 End Date: --       Order Dose: 500 mg       Quantity: 30 tablet    Refills: 0     No discharge procedures on file      PDMP Review     None          ED Provider  Electronically Signed by           Lynn Park MD  02/20/21 0109

## 2021-02-21 LAB
ATRIAL RATE: 94 BPM
P AXIS: 77 DEGREES
PR INTERVAL: 154 MS
QRS AXIS: 51 DEGREES
QRSD INTERVAL: 96 MS
QT INTERVAL: 348 MS
QTC INTERVAL: 435 MS
T WAVE AXIS: 70 DEGREES
VENTRICULAR RATE: 94 BPM

## 2021-02-21 PROCEDURE — 93010 ELECTROCARDIOGRAM REPORT: CPT | Performed by: INTERNAL MEDICINE

## 2021-02-23 ENCOUNTER — LAB (OUTPATIENT)
Dept: LAB | Facility: CLINIC | Age: 66
End: 2021-02-23
Payer: COMMERCIAL

## 2021-02-23 DIAGNOSIS — R19.4 CHANGE IN BOWEL HABITS: ICD-10-CM

## 2021-02-23 DIAGNOSIS — R19.7 DIARRHEA, UNSPECIFIED TYPE: ICD-10-CM

## 2021-02-23 LAB — C DIFF TOX B TCDB STL QL NAA+PROBE: NEGATIVE

## 2021-02-23 PROCEDURE — 87505 NFCT AGENT DETECTION GI: CPT

## 2021-02-24 LAB
CAMPYLOBACTER DNA SPEC NAA+PROBE: NORMAL
SALMONELLA DNA SPEC QL NAA+PROBE: NORMAL
SHIGA TOXIN STX GENE SPEC NAA+PROBE: NORMAL
SHIGELLA DNA SPEC QL NAA+PROBE: NORMAL

## 2021-02-26 ENCOUNTER — TELEPHONE (OUTPATIENT)
Dept: OTHER | Facility: OTHER | Age: 66
End: 2021-02-26

## 2021-02-26 DIAGNOSIS — R19.7 DIARRHEA, UNSPECIFIED TYPE: ICD-10-CM

## 2021-02-26 RX ORDER — MONTELUKAST SODIUM 4 MG/1
TABLET, CHEWABLE ORAL
Qty: 60 TABLET | Refills: 0 | Status: SHIPPED | OUTPATIENT
Start: 2021-02-26 | End: 2021-03-31 | Stop reason: SDUPTHER

## 2021-02-27 ENCOUNTER — TELEPHONE (OUTPATIENT)
Dept: GASTROENTEROLOGY | Facility: CLINIC | Age: 66
End: 2021-02-27

## 2021-02-27 NOTE — TELEPHONE ENCOUNTER
Pt has been waiting for her stool results  It was ordered by Dr Chandra Kidney       Tang Rosario via [facility]

## 2021-02-27 NOTE — TELEPHONE ENCOUNTER
Stool results given  She will add Imodium to the colestipol since she is still having diarrhea  She will call me back Tuesday with her progress

## 2021-03-01 NOTE — TELEPHONE ENCOUNTER
LMOM to call back office if she indeed did not go over results with doc, and is she has any further questions

## 2021-03-02 DIAGNOSIS — R19.7 DIARRHEA, UNSPECIFIED TYPE: Primary | ICD-10-CM

## 2021-03-02 RX ORDER — LOPERAMIDE HYDROCHLORIDE 2 MG/1
2 CAPSULE ORAL
Qty: 60 CAPSULE | Refills: 1 | Status: SHIPPED | OUTPATIENT
Start: 2021-03-02 | End: 2021-04-26 | Stop reason: SDUPTHER

## 2021-03-02 NOTE — TELEPHONE ENCOUNTER
Spoke to patient and gave her instructions on taking medication and to call back office with progress

## 2021-03-02 NOTE — TELEPHONE ENCOUNTER
Tell her to continue taking the Imodium with the colestipol as she is, to make sure she is sticking to the lactose-free diet, to give it 1 more week and call me with a progress report next Tuesday  If she is still having any significant diarrhea we will go to a much more powerful medication

## 2021-03-05 ENCOUNTER — TELEPHONE (OUTPATIENT)
Dept: CARDIAC SURGERY | Facility: CLINIC | Age: 66
End: 2021-03-05

## 2021-03-05 NOTE — TELEPHONE ENCOUNTER
Patient called, she had surgery by Dr Aubree Corona on 12/14/2020 (Lap  Hemicolectomy)  For the past month patient has been experiencing a lot of rt sides lower abdomen pain, especially if she brushes up against something  She has also had diarrhea so she thought it was from that, but it doesn't seem to be going away and would like to discuss this with someone  I informed her that I would send a message to Dr Tamara Chavez, and she will get back to her this afternoon to discuss  Patient agreed and understood  Best number to be reached is [phone]

## 2021-03-05 NOTE — TELEPHONE ENCOUNTER
Called patient back  I informed patient to follow with her gastroenterologist for the abdominal pain and diarrhea  Patient also complained of a small lump by her incision site  I told her this is probably scar tissue and advised her to massage the area

## 2021-03-09 NOTE — TELEPHONE ENCOUNTER
Breanna tell her to take over-the-counter Gas-X or phazyme, and that the gas might take some time to go away

## 2021-03-25 ENCOUNTER — TELEMEDICINE (OUTPATIENT)
Dept: INTERNAL MEDICINE CLINIC | Facility: CLINIC | Age: 66
End: 2021-03-25
Payer: COMMERCIAL

## 2021-03-25 DIAGNOSIS — Z20.822 EXPOSURE TO COVID-19 VIRUS: Primary | ICD-10-CM

## 2021-03-25 PROCEDURE — 1160F RVW MEDS BY RX/DR IN RCRD: CPT | Performed by: NURSE PRACTITIONER

## 2021-03-25 PROCEDURE — U0005 INFEC AGEN DETEC AMPLI PROBE: HCPCS | Performed by: NURSE PRACTITIONER

## 2021-03-25 PROCEDURE — 99213 OFFICE O/P EST LOW 20 MIN: CPT | Performed by: NURSE PRACTITIONER

## 2021-03-25 PROCEDURE — 1036F TOBACCO NON-USER: CPT | Performed by: NURSE PRACTITIONER

## 2021-03-25 PROCEDURE — U0003 INFECTIOUS AGENT DETECTION BY NUCLEIC ACID (DNA OR RNA); SEVERE ACUTE RESPIRATORY SYNDROME CORONAVIRUS 2 (SARS-COV-2) (CORONAVIRUS DISEASE [COVID-19]), AMPLIFIED PROBE TECHNIQUE, MAKING USE OF HIGH THROUGHPUT TECHNOLOGIES AS DESCRIBED BY CMS-2020-01-R: HCPCS | Performed by: NURSE PRACTITIONER

## 2021-03-25 RX ORDER — POTASSIUM CHLORIDE 20 MEQ/1
TABLET, EXTENDED RELEASE ORAL
COMMUNITY
Start: 2021-02-24 | End: 2021-06-21 | Stop reason: SDUPTHER

## 2021-03-25 NOTE — PROGRESS NOTES
COVID-19 Virtual Visit     Assessment/Plan:    Problem List Items Addressed This Visit     None      Visit Diagnoses     Exposure to COVID-19 virus    -  Primary    Relevant Orders    Novel Coronavirus (Covid-19),PCR [facility] - Collected in Office         Disposition:     I recommended the patient to come to our office to perform PCR testing for COVID-19  Chetna  States that her niece came to visit her last weekend and the patient found out yesterday that her niece tested positive for COVID  The patient currently is asymptomatic  The patient would like to be tested for Covid  Patient has been made aware that these results can take anywhere from 48 hours to 7 days to be final   During that time the patient has been instructed that they should self isolate and self quarantine at home  Management of  symptoms was discussed with the patient  They will be contacted with the COVID-19 results  The patient was also provided with the instructions for testing at our office today      I have spent 15 minutes directly with the patient  Greater than 50% of this time was spent in counseling/coordination of care regarding: instructions for management  COVID testing, self quarantine       Encounter provider DAR Nascimento    Provider located at [address] [place] [address]    Recent Visits  No visits were found meeting these conditions  Showing recent visits within past 7 days and meeting all other requirements     Today's Visits  Date Type Provider Dept   03/25/21 Telemedicine Meena Farrell today's visits and meeting all other requirements     Future Appointments  No visits were found meeting these conditions     Showing future appointments within next 150 days and meeting all other requirements      This virtual check-in was done via [employer] and patient was informed that this is not a secure, HIPAA-compliant platform  She agrees to proceed  Patient agrees to participate in a virtual check in via telephone or video visit instead of presenting to the office to address urgent/immediate medical needs  Patient is aware this is a billable service  After connecting through [facility], the patient was identified by name and date of birth  Elena Pastor was informed that this was a telemedicine visit and that the exam was being conducted confidentially over secure lines  My office door was closed  No one else was in the room  Elena Pastor acknowledged consent and understanding of privacy and security of the telemedicine visit  I informed the patient that I have reviewed her record in Epic and presented the opportunity for her to ask any questions regarding the visit today  The patient agreed to participate  Subjective:   Elena Pastor is a 72 y o  female who is concerned about COVID-19  Patient is currently asymptomatic  Patient denies fever, chills, fatigue, malaise, congestion, rhinorrhea, sore throat, anosmia, loss of taste, cough, shortness of breath, chest tightness, abdominal pain, nausea, vomiting, diarrhea, myalgias and headaches       Date of exposure: 3/19/2021    Exposure:   Contact with a person who is under investigation (PUI) for or who is positive for COVID-19 within the last 14 days?: Yes    Hospitalized recently for fever and/or lower respiratory symptoms?: No      Currently a healthcare worker that is involved in direct patient care?: No      Works in a special setting where the risk of COVID-19 transmission may be high? (this may include long-term care, correctional and care home facilities; homeless shelters; assisted-living facilities and group homes ): No      Resident in a special setting where the risk of COVID-19 transmission may be high? (this may include long-term care, correctional and care home facilities; homeless shelters; assisted-living facilities and group homes ): No      Lab Results   Component Value Date    SARSCOV2 Not Detected 12/07/2020     Past Medical History:   Diagnosis Date    Depression     Hyperlipidemia     Hypertension     Nodule of lower lobe of right lung      Past Surgical History:   Procedure Laterality Date    CHOLECYSTECTOMY      COLONOSCOPY      HEMICOLOECTOMY W/ ANASTOMOSIS N/A 12/14/2020    Procedure: LAPAROSCOPIC HAND-ASSISTED RIGHT HEMICOLECTOMY;  Surgeon: Maximino Joaquin MD;  [facility];  Service: Surgical Oncology    HYSTERECTOMY      partial     Current Outpatient Medications   Medication Sig Dispense Refill    ASPIRIN 81 PO Take 81 mg by mouth daily       atorvastatin (LIPITOR) 10 mg tablet Take 1 tablet (10 mg total) by mouth daily 90 tablet 2    colestipol (COLESTID) 1 g tablet TAKE TWO TABLETS BY MOUTH TWICE DAILY  60 tablet 0    escitalopram (Lexapro) 20 mg tablet Take 0 5 tablets (10 mg total) by mouth daily 90 tablet 0    fenofibrate (TRIGLIDE) 160 MG tablet Take 1 tablet (160 mg total) by mouth daily 90 tablet 3    furosemide (LASIX) 20 mg tablet Take 1 tablet (20 mg total) by mouth daily 90 tablet 2    loperamide (IMODIUM) 2 mg capsule Take 1 capsule (2 mg total) by mouth 2 (two) times daily after meals 60 capsule 1    Loratadine 10 MG CAPS loratadine 10 mg capsule   Take by oral route   losartan (COZAAR) 50 mg tablet TAKE ONE TABLET BY MOUTH EVERY DAY  90 tablet 0    Misc Natural Products (OSTEO BI-FLEX ADV DOUBLE ST PO) Take by mouth      naproxen (NAPROSYN) 500 mg tablet Take 1 tablet (500 mg total) by mouth 2 (two) times a day with meals 30 tablet 0    omeprazole (PriLOSEC) 20 mg delayed release capsule Take 1 capsule (20 mg total) by mouth daily 90 capsule 2    potassium chloride (K-DUR,KLOR-CON) 20 mEq tablet       Potassium Chloride ER 20 MEQ TBCR Take 1 tablet (20 mEq total) by mouth daily 90 tablet 3    TURMERIC PO Take by mouth       No current facility-administered medications for this visit  Allergies   Allergen Reactions    Codeine Rash       Review of Systems   Constitutional: Negative for activity change, chills, fatigue and fever  HENT: Negative for congestion, hearing loss, rhinorrhea, sore throat, trouble swallowing and voice change  Eyes: Negative for photophobia, pain, discharge and visual disturbance  Respiratory: Negative for cough, chest tightness and shortness of breath  Cardiovascular: Negative for chest pain, palpitations and leg swelling  Gastrointestinal: Negative for abdominal pain, blood in stool, constipation, diarrhea, nausea and vomiting  Endocrine: Negative for cold intolerance and heat intolerance  Genitourinary: Negative for difficulty urinating, frequency, hematuria, urgency, vaginal bleeding and vaginal discharge  Musculoskeletal: Negative for arthralgias and myalgias  Skin: Negative  Neurological: Negative for dizziness, weakness, numbness and headaches  Psychiatric/Behavioral: Negative for decreased concentration  The patient is not nervous/anxious  Objective: There were no vitals filed for this visit  Physical Exam  Constitutional:       General: She is not in acute distress  Appearance: She is well-developed  HENT:      Head: Normocephalic and atraumatic  Eyes:      Pupils: Pupils are equal, round, and reactive to light  Neck:      Musculoskeletal: Normal range of motion  Pulmonary:      Effort: Pulmonary effort is normal    Neurological:      Mental Status: She is alert and oriented to person, place, and time  Psychiatric:         Mood and Affect: Mood normal          Behavior: Behavior normal          Thought Content: Thought content normal          Judgment: Judgment normal        VIRTUAL VISIT DISCLAIMER    Chetna Underwood Ludivina acknowledges that she has consented to an online visit or consultation   She understands that the online visit is based solely on information provided by her, and that, in the absence of a face-to-face physical evaluation by the physician, the diagnosis she receives is both limited and provisional in terms of accuracy and completeness  This is not intended to replace a full medical face-to-face evaluation by the physician  Laureano Hamilton understands and accepts these terms

## 2021-03-26 ENCOUNTER — TELEPHONE (OUTPATIENT)
Dept: INTERNAL MEDICINE CLINIC | Facility: CLINIC | Age: 66
End: 2021-03-26

## 2021-03-26 LAB — SARS-COV-2 RNA RESP QL NAA+PROBE: POSITIVE

## 2021-03-26 NOTE — TELEPHONE ENCOUNTER
----- Message from Margarita Zhao sent at 3/26/2021  5:59 PM EDT -----  Covid positive  Continue quarantine and follow up Monday  Thanks

## 2021-03-29 NOTE — TELEPHONE ENCOUNTER
Pt is calling back re covid + result, she has no symptoms at all    is asking if she should wait a certain period of time before she gets her covid vaccine ? ?

## 2021-03-31 ENCOUNTER — TELEPHONE (OUTPATIENT)
Dept: GASTROENTEROLOGY | Facility: CLINIC | Age: 66
End: 2021-03-31

## 2021-03-31 DIAGNOSIS — R19.7 DIARRHEA, UNSPECIFIED TYPE: ICD-10-CM

## 2021-03-31 RX ORDER — MONTELUKAST SODIUM 4 MG/1
2 TABLET, CHEWABLE ORAL 2 TIMES DAILY
Qty: 120 TABLET | Refills: 0 | Status: SHIPPED | OUTPATIENT
Start: 2021-03-31 | End: 2021-04-25

## 2021-03-31 NOTE — TELEPHONE ENCOUNTER
Yes, thank you diffuse wheezing warranting IV solumedrol  Pulm Consult; monitor for clinical response to steroids  -check procal and contemplate CT chest to r/o inflitrate

## 2021-03-31 NOTE — TELEPHONE ENCOUNTER
Spoke with patient  History of Diarrhea    Patient c/o 2 episodes of diarrhea a week after a formed BM  She is producing a formed BM every morning after her coffee  She is taking colestipol 2g by mouth BID, and imodium 1 tablet BID  She is following a lactose free diet  Denies gas, bloating, abdominal pain, nausea, vomiting, black or bloody stool  Would you like her to take colestipol TID?

## 2021-03-31 NOTE — TELEPHONE ENCOUNTER
Pt called stating she began experiencing diarrhea this morning even though she is taking colestipol & imodium

## 2021-04-01 NOTE — TELEPHONE ENCOUNTER
Received a fax from [address] [place] [address] stating they are unable to process the authorization due to the pt being eligible for Medicare

## 2021-04-14 DIAGNOSIS — I10 ESSENTIAL HYPERTENSION: ICD-10-CM

## 2021-04-14 RX ORDER — LOSARTAN POTASSIUM 50 MG/1
TABLET ORAL
Qty: 90 TABLET | Refills: 0 | Status: SHIPPED | OUTPATIENT
Start: 2021-04-14 | End: 2021-07-10

## 2021-04-25 DIAGNOSIS — R19.7 DIARRHEA, UNSPECIFIED TYPE: ICD-10-CM

## 2021-04-25 RX ORDER — MONTELUKAST SODIUM 4 MG/1
TABLET, CHEWABLE ORAL
Qty: 120 TABLET | Refills: 0 | Status: SHIPPED | OUTPATIENT
Start: 2021-04-25 | End: 2021-05-18 | Stop reason: SDUPTHER

## 2021-04-26 DIAGNOSIS — R19.7 DIARRHEA, UNSPECIFIED TYPE: ICD-10-CM

## 2021-04-26 RX ORDER — LOPERAMIDE HYDROCHLORIDE 2 MG/1
2 CAPSULE ORAL
Qty: 60 CAPSULE | Refills: 1 | Status: SHIPPED | OUTPATIENT
Start: 2021-04-26 | End: 2021-05-18 | Stop reason: SDUPTHER

## 2021-05-07 NOTE — TELEPHONE ENCOUNTER
LEW: Spoke with patient  History of diarrhea    Patient c/o continued diarrhea after increasing her colestipol to TID, and taking her lomotil BID  She will come in for an OV to follow-up 5/18/2021  Advised patient to encourage plenty of fluids with electrolytes, and follow BRAT diet  Stool studies in February were negative

## 2021-05-17 ENCOUNTER — TELEPHONE (OUTPATIENT)
Dept: INTERNAL MEDICINE CLINIC | Facility: CLINIC | Age: 66
End: 2021-05-17

## 2021-05-17 NOTE — TELEPHONE ENCOUNTER
Patient needs another recommendation for an ENT  The doctor on the referral she has does not take her insurance

## 2021-05-18 ENCOUNTER — OFFICE VISIT (OUTPATIENT)
Dept: GASTROENTEROLOGY | Facility: CLINIC | Age: 66
End: 2021-05-18
Payer: COMMERCIAL

## 2021-05-18 VITALS
HEIGHT: 61 IN | WEIGHT: 176 LBS | HEART RATE: 86 BPM | SYSTOLIC BLOOD PRESSURE: 160 MMHG | DIASTOLIC BLOOD PRESSURE: 78 MMHG | BODY MASS INDEX: 33.23 KG/M2 | RESPIRATION RATE: 18 BRPM

## 2021-05-18 DIAGNOSIS — Z86.010 HISTORY OF COLON POLYPS: Primary | ICD-10-CM

## 2021-05-18 DIAGNOSIS — K58.0 IRRITABLE BOWEL SYNDROME WITH DIARRHEA: ICD-10-CM

## 2021-05-18 DIAGNOSIS — R19.7 DIARRHEA, UNSPECIFIED TYPE: ICD-10-CM

## 2021-05-18 PROCEDURE — 1036F TOBACCO NON-USER: CPT | Performed by: PHYSICIAN ASSISTANT

## 2021-05-18 PROCEDURE — 3077F SYST BP >= 140 MM HG: CPT | Performed by: PHYSICIAN ASSISTANT

## 2021-05-18 PROCEDURE — 1160F RVW MEDS BY RX/DR IN RCRD: CPT | Performed by: PHYSICIAN ASSISTANT

## 2021-05-18 PROCEDURE — 3008F BODY MASS INDEX DOCD: CPT | Performed by: PHYSICIAN ASSISTANT

## 2021-05-18 PROCEDURE — 99214 OFFICE O/P EST MOD 30 MIN: CPT | Performed by: PHYSICIAN ASSISTANT

## 2021-05-18 PROCEDURE — 3078F DIAST BP <80 MM HG: CPT | Performed by: PHYSICIAN ASSISTANT

## 2021-05-18 RX ORDER — LOPERAMIDE HYDROCHLORIDE 2 MG/1
2 CAPSULE ORAL
Qty: 60 CAPSULE | Refills: 1 | Status: SHIPPED | OUTPATIENT
Start: 2021-05-18 | End: 2021-06-21 | Stop reason: SDUPTHER

## 2021-05-18 RX ORDER — MONTELUKAST SODIUM 4 MG/1
2 TABLET, CHEWABLE ORAL 2 TIMES DAILY
Qty: 120 TABLET | Refills: 3 | Status: SHIPPED | OUTPATIENT
Start: 2021-05-18 | End: 2021-07-20 | Stop reason: SDUPTHER

## 2021-05-18 NOTE — PROGRESS NOTES
[facility] - Outpatient Follow-up Note  Elisha Orozco 72 y o  female MRN: [id number]  Encounter: [id number]          ASSESSMENT AND PLAN:      1  Irritable bowel syndrome with diarrhea  2  History of colon polyps    Patient has a long history of diarrhea, abdominal cramping, and gas x years  Colonoscopy in October of 2020 showed a 30mm flat multilobulated polyp in the cecum which was extensive and infiltrating and not able to be removed endoscopically  She was referred to Dr Coco Fuentes and underwent a right hemicolectomy  Pathology was benign - tubular adenoma - no malignancy  Biopsies of her colon during the colonoscopy were also negative for microscopic colitis and she had negative celiac serology in the past   She is currently on Colestid 1gm TID with partial benefit and also will utilize Imodium  Recommend to add the probiotic Florastor and the fiber supplement Benefiber  Will increase Colestid to 2gm BID  Recommend dietary modifications: stop excessive diet soda consumption/artificial sweeteners and avoidance of dairy  She is due for repeat colonoscopy in October of this year (I reviewed this recommendation with patient)  Follow up in 4 weeks  If not improved, will begin a Xifaxan 550mg po TID x 14 days course   ______________________________________________________________________    SUBJECTIVE:  Patient is a 72year old female who presents to the office for follow up of her IBS/chronic diarrhea  She reports she has had issues with diarrhea, abdominal cramping, and gas for years  Colonoscopy in October of 2020 showed a 30mm flat multilobulated polyp in the cecum which was extensive and infiltrating and not able to be removed endoscopically  She was referred to Dr Coco Fuentes and underwent a right hemicolectomy  Pathology was benign (tubular adenoma; there was no malignancy)    Biopsies of her colon during the colonoscopy were also negative for microscopic colitis and she had negative celiac serology in the past She is currently on Colestid 1gm TID with partial benefit and also will utilize Imodium  She reports 1-3 BMs a day  No blood in the stool  No unintentional weight loss  She does notice an association with stress worsening her symptoms  She also notices dairy to worsen her symptoms  She also reports drinking a lot of diet soda  She has never tried a probiotic or fiber supplement  She had a cholecystectomy years ago  REVIEW OF SYSTEMS IS OTHERWISE NEGATIVE        Historical Information   Past Medical History:   Diagnosis Date    Depression     Hyperlipidemia     Hypertension     Nodule of lower lobe of right lung      Past Surgical History:   Procedure Laterality Date    CHOLECYSTECTOMY      COLONOSCOPY      HEMICOLOECTOMY W/ ANASTOMOSIS N/A 2020    Procedure: LAPAROSCOPIC HAND-ASSISTED RIGHT HEMICOLECTOMY;  Surgeon: Dempsey Fabry, MD;  Location[facility];  Service: Surgical Oncology    HYSTERECTOMY      partial     Social History   Social History     Substance and Sexual Activity   Alcohol Use Not Currently     Social History     Substance and Sexual Activity   Drug Use Never     Social History     Tobacco Use   Smoking Status Former Smoker    Packs/day: 0 50    Years: 52 00    Pack years: 26 00    Types: Cigarettes    Quit date: 2020    Years since quittin 4   Smokeless Tobacco Never Used     Family History   Problem Relation Age of Onset    Throat cancer Mother     Heart disease Father        Meds/Allergies       Current Outpatient Medications:     ASPIRIN 81 PO    atorvastatin (LIPITOR) 10 mg tablet    colestipol (COLESTID) 1 g tablet    escitalopram (Lexapro) 20 mg tablet    fenofibrate (TRIGLIDE) 160 MG tablet    furosemide (LASIX) 20 mg tablet    loperamide (IMODIUM) 2 mg capsule    Loratadine 10 MG CAPS    losartan (COZAAR) 50 mg tablet    Misc Natural Products (OSTEO BI-FLEX ADV DOUBLE ST PO)    naproxen (NAPROSYN) 500 mg tablet    omeprazole (PriLOSEC) 20 mg delayed release capsule    potassium chloride (K-DUR,KLOR-CON) 20 mEq tablet    Potassium Chloride ER 20 MEQ TBCR    TURMERIC PO    Allergies   Allergen Reactions    Codeine Rash           Objective     Blood pressure 160/78, pulse 86, resp  rate 18, height 5' 1" (1 549 m), weight 79 8 kg (176 lb)  Body mass index is 33 25 kg/m²  PHYSICAL EXAM:      General Appearance:   Alert, cooperative, no distress   HEENT:   Normocephalic, atraumatic, anicteric    Neck:  Supple, symmetrical, trachea midline   Lungs:   Clear to auscultation bilaterally; no rales, rhonchi or wheezing; respirations unlabored    Heart[de-identified]   Regular rate and rhythm; no murmur, rub, or gallop  Abdomen:   Soft, non-tender, non-distended; normal bowel sounds; no masses, no organomegaly    Genitalia:   Deferred    Rectal:   Deferred    Extremities:  No cyanosis, clubbing or edema    Pulses:  2+ and symmetric    Skin:  No jaundice, rashes, or lesions    Lymph nodes:  No palpable cervical lymphadenopathy        Lab Results:   No visits with results within 1 Day(s) from this visit  Latest known visit with results is:   Telemedicine on 03/25/2021   Component Date Value    SARS-CoV-2 03/25/2021 Positive*         Radiology Results:   No results found

## 2021-06-15 ENCOUNTER — TELEPHONE (OUTPATIENT)
Dept: GASTROENTEROLOGY | Facility: CLINIC | Age: 66
End: 2021-06-15

## 2021-06-15 DIAGNOSIS — K58.0 IRRITABLE BOWEL SYNDROME WITH DIARRHEA: Primary | ICD-10-CM

## 2021-06-15 NOTE — TELEPHONE ENCOUNTER
Spoke to patient  She has had partial improvement on the probiotic, fiber, and increased Colestid dose but is still having bouts of diarrhea and gas  Will treat her IBS-D with a Xifaxan 550mg po TID x 14 days course  Can you please make sure it's approved by her insurance company and see if it needs a PA?

## 2021-06-19 DIAGNOSIS — E78.2 MIXED HYPERLIPIDEMIA: ICD-10-CM

## 2021-06-19 NOTE — TELEPHONE ENCOUNTER
Pt is completely out of medication, took the last tab of atorvastatin this morning, Please advise, call back upon completion

## 2021-06-21 ENCOUNTER — OFFICE VISIT (OUTPATIENT)
Dept: INTERNAL MEDICINE CLINIC | Facility: CLINIC | Age: 66
End: 2021-06-21
Payer: COMMERCIAL

## 2021-06-21 ENCOUNTER — APPOINTMENT (OUTPATIENT)
Dept: LAB | Facility: CLINIC | Age: 66
End: 2021-06-21
Payer: COMMERCIAL

## 2021-06-21 VITALS
WEIGHT: 173.6 LBS | DIASTOLIC BLOOD PRESSURE: 68 MMHG | OXYGEN SATURATION: 94 % | RESPIRATION RATE: 16 BRPM | HEIGHT: 61 IN | TEMPERATURE: 97.7 F | SYSTOLIC BLOOD PRESSURE: 140 MMHG | HEART RATE: 92 BPM | BODY MASS INDEX: 32.77 KG/M2

## 2021-06-21 DIAGNOSIS — F32.0 CURRENT MILD EPISODE OF MAJOR DEPRESSIVE DISORDER, UNSPECIFIED WHETHER RECURRENT (HCC): ICD-10-CM

## 2021-06-21 DIAGNOSIS — R73.03 PREDIABETES: ICD-10-CM

## 2021-06-21 DIAGNOSIS — E87.6 HYPOKALEMIA: ICD-10-CM

## 2021-06-21 DIAGNOSIS — Z12.2 ENCOUNTER FOR SCREENING FOR LUNG CANCER: ICD-10-CM

## 2021-06-21 DIAGNOSIS — E78.2 MIXED HYPERLIPIDEMIA: ICD-10-CM

## 2021-06-21 DIAGNOSIS — M19.90 ARTHRITIS: ICD-10-CM

## 2021-06-21 DIAGNOSIS — E66.9 OBESITY (BMI 30-39.9): ICD-10-CM

## 2021-06-21 DIAGNOSIS — R19.7 DIARRHEA, UNSPECIFIED TYPE: ICD-10-CM

## 2021-06-21 DIAGNOSIS — R30.0 DYSURIA: ICD-10-CM

## 2021-06-21 DIAGNOSIS — D12.0 ADENOMA OF CECUM: ICD-10-CM

## 2021-06-21 DIAGNOSIS — I10 ESSENTIAL HYPERTENSION: Primary | ICD-10-CM

## 2021-06-21 DIAGNOSIS — Z87.891 PERSONAL HISTORY OF NICOTINE DEPENDENCE: ICD-10-CM

## 2021-06-21 DIAGNOSIS — R91.1 NODULE OF LOWER LOBE OF RIGHT LUNG: ICD-10-CM

## 2021-06-21 DIAGNOSIS — F41.9 ANXIETY AND DEPRESSION: ICD-10-CM

## 2021-06-21 DIAGNOSIS — N18.2 STAGE 2 CHRONIC KIDNEY DISEASE: ICD-10-CM

## 2021-06-21 DIAGNOSIS — I10 ESSENTIAL HYPERTENSION: ICD-10-CM

## 2021-06-21 DIAGNOSIS — F17.200 SMOKING ADDICTION: ICD-10-CM

## 2021-06-21 DIAGNOSIS — F32.A ANXIETY AND DEPRESSION: ICD-10-CM

## 2021-06-21 DIAGNOSIS — H91.8X3 OTHER SPECIFIED HEARING LOSS OF BOTH EARS: ICD-10-CM

## 2021-06-21 LAB
ALBUMIN SERPL BCP-MCNC: 3.9 G/DL (ref 3.5–5)
ALP SERPL-CCNC: 75 U/L (ref 46–116)
ALT SERPL W P-5'-P-CCNC: 32 U/L (ref 12–78)
ANION GAP SERPL CALCULATED.3IONS-SCNC: 5 MMOL/L (ref 4–13)
AST SERPL W P-5'-P-CCNC: 24 U/L (ref 5–45)
BACTERIA UR QL AUTO: ABNORMAL /HPF
BASOPHILS # BLD AUTO: 0.06 THOUSANDS/ΜL (ref 0–0.1)
BASOPHILS NFR BLD AUTO: 1 % (ref 0–1)
BILIRUB SERPL-MCNC: 0.41 MG/DL (ref 0.2–1)
BILIRUB UR QL STRIP: NEGATIVE
BUN SERPL-MCNC: 16 MG/DL (ref 5–25)
CALCIUM SERPL-MCNC: 9.2 MG/DL (ref 8.3–10.1)
CAOX CRY URNS QL MICRO: ABNORMAL /HPF
CHLORIDE SERPL-SCNC: 109 MMOL/L (ref 100–108)
CHOLEST SERPL-MCNC: 144 MG/DL (ref 50–200)
CLARITY UR: ABNORMAL
CO2 SERPL-SCNC: 27 MMOL/L (ref 21–32)
COLOR UR: YELLOW
CREAT SERPL-MCNC: 0.66 MG/DL (ref 0.6–1.3)
EOSINOPHIL # BLD AUTO: 0.28 THOUSAND/ΜL (ref 0–0.61)
EOSINOPHIL NFR BLD AUTO: 3 % (ref 0–6)
ERYTHROCYTE [DISTWIDTH] IN BLOOD BY AUTOMATED COUNT: 12.7 % (ref 11.6–15.1)
EST. AVERAGE GLUCOSE BLD GHB EST-MCNC: 128 MG/DL
GFR SERPL CREATININE-BSD FRML MDRD: 93 ML/MIN/1.73SQ M
GLUCOSE P FAST SERPL-MCNC: 97 MG/DL (ref 65–99)
GLUCOSE UR STRIP-MCNC: NEGATIVE MG/DL
HBA1C MFR BLD: 6.1 %
HCT VFR BLD AUTO: 40.3 % (ref 34.8–46.1)
HDLC SERPL-MCNC: 47 MG/DL
HGB BLD-MCNC: 13.5 G/DL (ref 11.5–15.4)
HGB UR QL STRIP.AUTO: NEGATIVE
IMM GRANULOCYTES # BLD AUTO: 0.09 THOUSAND/UL (ref 0–0.2)
IMM GRANULOCYTES NFR BLD AUTO: 1 % (ref 0–2)
KETONES UR STRIP-MCNC: NEGATIVE MG/DL
LDLC SERPL CALC-MCNC: 52 MG/DL (ref 0–100)
LEUKOCYTE ESTERASE UR QL STRIP: ABNORMAL
LYMPHOCYTES # BLD AUTO: 2.65 THOUSANDS/ΜL (ref 0.6–4.47)
LYMPHOCYTES NFR BLD AUTO: 25 % (ref 14–44)
MCH RBC QN AUTO: 30.5 PG (ref 26.8–34.3)
MCHC RBC AUTO-ENTMCNC: 33.5 G/DL (ref 31.4–37.4)
MCV RBC AUTO: 91 FL (ref 82–98)
MONOCYTES # BLD AUTO: 0.79 THOUSAND/ΜL (ref 0.17–1.22)
MONOCYTES NFR BLD AUTO: 7 % (ref 4–12)
NEUTROPHILS # BLD AUTO: 6.92 THOUSANDS/ΜL (ref 1.85–7.62)
NEUTS SEG NFR BLD AUTO: 63 % (ref 43–75)
NITRITE UR QL STRIP: NEGATIVE
NON-SQ EPI CELLS URNS QL MICRO: ABNORMAL /HPF
NRBC BLD AUTO-RTO: 0 /100 WBCS
PH UR STRIP.AUTO: 6 [PH]
PLATELET # BLD AUTO: 250 THOUSANDS/UL (ref 149–390)
PMV BLD AUTO: 10.7 FL (ref 8.9–12.7)
POTASSIUM SERPL-SCNC: 3.8 MMOL/L (ref 3.5–5.3)
PROT SERPL-MCNC: 7.4 G/DL (ref 6.4–8.2)
PROT UR STRIP-MCNC: NEGATIVE MG/DL
RBC # BLD AUTO: 4.42 MILLION/UL (ref 3.81–5.12)
RBC #/AREA URNS AUTO: ABNORMAL /HPF
SODIUM SERPL-SCNC: 141 MMOL/L (ref 136–145)
SP GR UR STRIP.AUTO: 1.01 (ref 1–1.03)
TRIGL SERPL-MCNC: 223 MG/DL
UROBILINOGEN UR QL STRIP.AUTO: 0.2 E.U./DL
WBC # BLD AUTO: 10.79 THOUSAND/UL (ref 4.31–10.16)
WBC #/AREA URNS AUTO: ABNORMAL /HPF

## 2021-06-21 PROCEDURE — 1160F RVW MEDS BY RX/DR IN RCRD: CPT | Performed by: NURSE PRACTITIONER

## 2021-06-21 PROCEDURE — 3008F BODY MASS INDEX DOCD: CPT | Performed by: NURSE PRACTITIONER

## 2021-06-21 PROCEDURE — 85025 COMPLETE CBC W/AUTO DIFF WBC: CPT

## 2021-06-21 PROCEDURE — 83036 HEMOGLOBIN GLYCOSYLATED A1C: CPT

## 2021-06-21 PROCEDURE — 36415 COLL VENOUS BLD VENIPUNCTURE: CPT

## 2021-06-21 PROCEDURE — 3078F DIAST BP <80 MM HG: CPT | Performed by: NURSE PRACTITIONER

## 2021-06-21 PROCEDURE — 3077F SYST BP >= 140 MM HG: CPT | Performed by: NURSE PRACTITIONER

## 2021-06-21 PROCEDURE — 80053 COMPREHEN METABOLIC PANEL: CPT

## 2021-06-21 PROCEDURE — 81001 URINALYSIS AUTO W/SCOPE: CPT | Performed by: NURSE PRACTITIONER

## 2021-06-21 PROCEDURE — 99214 OFFICE O/P EST MOD 30 MIN: CPT | Performed by: NURSE PRACTITIONER

## 2021-06-21 PROCEDURE — 1036F TOBACCO NON-USER: CPT | Performed by: NURSE PRACTITIONER

## 2021-06-21 PROCEDURE — 80061 LIPID PANEL: CPT

## 2021-06-21 PROCEDURE — 87086 URINE CULTURE/COLONY COUNT: CPT

## 2021-06-21 RX ORDER — ATORVASTATIN CALCIUM 10 MG/1
10 TABLET, FILM COATED ORAL DAILY
Qty: 90 TABLET | Refills: 2 | Status: SHIPPED | OUTPATIENT
Start: 2021-06-21 | End: 2021-06-21 | Stop reason: SDUPTHER

## 2021-06-21 RX ORDER — FENOFIBRATE 160 MG/1
160 TABLET ORAL DAILY
Qty: 90 TABLET | Refills: 3 | Status: SHIPPED | OUTPATIENT
Start: 2021-06-21 | End: 2022-01-03 | Stop reason: SDUPTHER

## 2021-06-21 RX ORDER — POTASSIUM CHLORIDE 20 MEQ/1
20 TABLET, EXTENDED RELEASE ORAL DAILY
Qty: 90 TABLET | Refills: 1 | Status: SHIPPED | OUTPATIENT
Start: 2021-06-21 | End: 2021-10-11

## 2021-06-21 RX ORDER — ATORVASTATIN CALCIUM 10 MG/1
10 TABLET, FILM COATED ORAL DAILY
Qty: 90 TABLET | Refills: 2 | Status: SHIPPED | OUTPATIENT
Start: 2021-06-21 | End: 2022-01-03 | Stop reason: SDUPTHER

## 2021-06-21 RX ORDER — LOPERAMIDE HYDROCHLORIDE 2 MG/1
2 CAPSULE ORAL
Qty: 60 CAPSULE | Refills: 1 | Status: SHIPPED | OUTPATIENT
Start: 2021-06-21 | End: 2021-07-16 | Stop reason: SDUPTHER

## 2021-06-21 RX ORDER — NICOTINE 21 MG/24HR
1 PATCH, TRANSDERMAL 24 HOURS TRANSDERMAL EVERY 24 HOURS
Qty: 42 PATCH | Refills: 0 | Status: SHIPPED | OUTPATIENT
Start: 2021-06-21 | End: 2021-11-05 | Stop reason: ALTCHOICE

## 2021-06-21 RX ORDER — BUSPIRONE HYDROCHLORIDE 5 MG/1
5 TABLET ORAL 3 TIMES DAILY
Qty: 90 TABLET | Refills: 0 | Status: SHIPPED | OUTPATIENT
Start: 2021-06-21 | End: 2021-07-16

## 2021-06-21 RX ORDER — FUROSEMIDE 20 MG/1
20 TABLET ORAL DAILY
Qty: 90 TABLET | Refills: 2 | Status: SHIPPED | OUTPATIENT
Start: 2021-06-21 | End: 2022-01-03 | Stop reason: SDUPTHER

## 2021-06-21 NOTE — ASSESSMENT & PLAN NOTE
Patient's blood pressure in the office today is 140/68  She continues on her antihypertensive medications  Information was provided to the patient regarding low-sodium diet

## 2021-06-21 NOTE — ASSESSMENT & PLAN NOTE
The patient continues to follow with Dr Gilda Hubbard in thoracic surgery  Surveillance imaging has been ordered  Her last scan showed stability of her lower lung nodule however the concern is still there for a carcinoid based tumor  When the patient last saw Dr Gilda Hubbard biopsy versus surveillance imaging was discussed and the patient would like to proceed with surveillance imaging  She is scheduled for an upcoming surveillance scan

## 2021-06-21 NOTE — ASSESSMENT & PLAN NOTE
Patient continues with her depression and anxiety  At her last visit her Lexapro was increased to 20 mg a day  She states she does have a lot of stress at home  I did recommend at this time adding BuSpar 5 mg 3 times a day  Side effects were discussed with the patient  Patient has declined behavioral therapy

## 2021-06-21 NOTE — ASSESSMENT & PLAN NOTE
Patient's BMI in the office today is 32 80  Information was provided to the patient regarding healthy eating and exercise

## 2021-06-21 NOTE — ASSESSMENT & PLAN NOTE
The patient is due for lipid panel  Information was provided to the patient regarding a low-fat and low-cholesterol diet

## 2021-06-21 NOTE — ASSESSMENT & PLAN NOTE
Patient continues to smoke  She currently is smoking 1 pack per day  At her high as she was smoking 4 packs per day  She is interested in smoking cessation  I did have discussion with the patient regarding ways to quit smoking  She is interested in the patch  I will send nicotine patches 21 mg to the patient's pharmacy  She is aware that these may not be covered    I also discussed with the patient that she should reach out to her health insurance carrier as  They may have a smoking cessation plan in place

## 2021-06-21 NOTE — PROGRESS NOTES
[facility] - [facility]    NAME: Chetna Sandhu  AGE: 72 y o  SEX: female  : 1955     DATE: 2021     Assessment and Plan:     Problem List Items Addressed This Visit        Digestive    Adenoma of cecum       Patient continues to follow with Surgical Oncology  Cardiovascular and Mediastinum    Hypertension - Primary       Patient's blood pressure in the office today is 140/68  She continues on her antihypertensive medications  Information was provided to the patient regarding low-sodium diet  Relevant Medications    furosemide (LASIX) 20 mg tablet    Other Relevant Orders    CBC and differential    Comprehensive metabolic panel       Musculoskeletal and Integument    Arthritis       Patient continues to have chronic neck pain due to degenerative changes  She had been taking Motrin and Aleve  She is requesting a prescription for Motrin be sent to her pharmacy  I did have discussion with the patient regarding her chronic kidney disease and nephro toxic medications  I did recommend that she take Tylenol Arthritis over-the-counter  In addition she could try Osteo Bi-Flex triple strength, turmeric and tart cherry  Genitourinary    Stage 2 chronic kidney disease     Lab Results   Component Value Date    EGFR 74 2021    EGFR 94 2020    EGFR 103 2020    CREATININE 0 83 2021    CREATININE 0 63 2020    CREATININE 0 49 (L) 2020     Will continue to monitor  Information was provided to the patient regarding chronic kidney disease  Relevant Medications    furosemide (LASIX) 20 mg tablet       Other    Prediabetes       Patient's last hemoglobin A1c is 6 1  Hemoglobin A1c has been ordered  Information was provided to the patient regarding prediabetes  Relevant Orders    HEMOGLOBIN A1C W/ EAG ESTIMATION    Depression       Patient continues with her depression and anxiety    At her last visit her Lexapro was increased to 20 mg a day  She states she does have a lot of stress at home  I did recommend at this time adding BuSpar 5 mg 3 times a day  Side effects were discussed with the patient  Patient has declined behavioral therapy  Relevant Medications    nicotine (NICODERM CQ) 21 mg/24 hr TD 24 hr patch    busPIRone (BUSPAR) 5 mg tablet    Hyperlipidemia       The patient is due for lipid panel  Information was provided to the patient regarding a low-fat and low-cholesterol diet  Relevant Medications    atorvastatin (LIPITOR) 10 mg tablet    fenofibrate (TRIGLIDE) 160 MG tablet    Other Relevant Orders    Lipid Panel with Direct LDL reflex    Hypokalemia       Patient continues with hypokalemia  She is taking potassium   Chloride 20 mEq daily  Relevant Medications    potassium chloride (K-DUR,KLOR-CON) 20 mEq tablet    BMI 32 0-32 9,adult       Patient's BMI in the office today is 32 80  Information was provided to the patient regarding healthy eating and exercise  Relevant Orders    CBC and differential    Comprehensive metabolic panel    Lipid Panel with Direct LDL reflex    Smoking addiction       Patient continues to smoke  She currently is smoking 1 pack per day  At her high as she was smoking 4 packs per day  She is interested in smoking cessation  I did have discussion with the patient regarding ways to quit smoking  She is interested in the patch  I will send nicotine patches 21 mg to the patient's pharmacy  She is aware that these may not be covered  I also discussed with the patient that she should reach out to her health insurance carrier as  They may have a smoking cessation plan in place         Nodule of lower lobe of right lung      The patient continues to follow with Dr Naif Fernandez in thoracic surgery  Surveillance imaging has been ordered    Her last scan showed stability of her lower lung nodule however the concern is still there for a carcinoid based tumor  When the patient last saw Dr Vickey Bower biopsy versus surveillance imaging was discussed and the patient would like to proceed with surveillance imaging  She is scheduled for an upcoming surveillance scan  Other Visit Diagnoses     Diarrhea, unspecified type        Relevant Medications    loperamide (IMODIUM) 2 mg capsule    Encounter for screening for lung cancer        Personal history of nicotine dependence        Relevant Medications    nicotine (NICODERM CQ) 21 mg/24 hr TD 24 hr patch    Other specified hearing loss of both ears        Relevant Orders    Ambulatory referral to Audiology    Dysuria        Relevant Orders    UA (URINE) with reflex to Scope    Urine culture    Anxiety and depression        Relevant Medications    nicotine (NICODERM CQ) 21 mg/24 hr TD 24 hr patch    busPIRone (BUSPAR) 5 mg tablet          BMI Counseling: Body mass index is 32 8 kg/m²  The BMI is above normal  Nutrition recommendations include decreasing portion sizes, decreasing fast food intake, consuming healthier snacks, limiting drinks that contain sugar, moderation in carbohydrate intake, increasing intake of lean protein, reducing intake of saturated and trans fat and reducing intake of cholesterol  Exercise recommendations include exercising 3-5 times per week  Return in about 4 weeks (around 7/19/2021) for ted Bhandari  Chief Complaint:     Chief Complaint   Patient presents with    Follow-up     wants labs done, also wants help to stop smoking        History of Present Illness:     Chetna   Presents to the office today for follow-up  Overall patient feels well  She feels that she does have an increase in her depression and anxiety symptoms and BuSpar was added to her medication today  Patient continues to smoke and is interested in smoking cessation and nicotine patches were sent to the patient's pharmacy    The patient has not yet been vaccinated against COVID-19 and this was discussed with the patient  She will continue to consider this vaccination  She is due for a tetanus booster and she will check with her insurance carrier to see if this is covered  She did receive her 1st pneumonia vaccine in the office today  Patient was referred to audiology for a hearing test has she states her family was concerned regarding her loss of hearing  Review of Systems:     Review of Systems   Constitutional: Negative for activity change, fatigue and fever  HENT: Negative for congestion, hearing loss, rhinorrhea, trouble swallowing and voice change  Eyes: Negative for photophobia, pain, discharge and visual disturbance  Respiratory: Negative for cough, chest tightness and shortness of breath  Cardiovascular: Negative for chest pain, palpitations and leg swelling  Gastrointestinal: Negative for abdominal pain, blood in stool, constipation, nausea and vomiting  Endocrine: Negative for cold intolerance and heat intolerance  Genitourinary: Negative for difficulty urinating, frequency, hematuria, urgency, vaginal bleeding and vaginal discharge  Musculoskeletal: Negative for arthralgias and myalgias  Skin: Negative  Neurological: Negative for dizziness, weakness, numbness and headaches  Psychiatric/Behavioral: Positive for dysphoric mood  Negative for decreased concentration  The patient is not nervous/anxious           Problem List:     Patient Active Problem List   Diagnosis    Prediabetes    Depression    Hyperlipidemia    Hypertension    Hypokalemia    Arthritis    BMI 32 0-32 9,adult    Smoking addiction    Adenoma of cecum    Nodule of lower lobe of right lung    Stage 2 chronic kidney disease        Objective:     /68 (BP Location: Left arm, Patient Position: Sitting, Cuff Size: Standard)   Pulse 92   Temp 97 7 °F (36 5 °C) (Temporal) Comment: no nsaids  Resp 16   Ht 5' 1" (1 549 m)   Wt 78 7 kg (173 lb 9 6 oz)   SpO2 94% BMI 32 80 kg/m²     Current Outpatient Medications   Medication Instructions    ASPIRIN 81 PO 81 mg, Oral, Daily    atorvastatin (LIPITOR) 10 mg, Oral, Daily    busPIRone (BUSPAR) 5 mg, Oral, 3 times daily    colestipol (COLESTID) 2 g, Oral, 2 times daily    escitalopram (LEXAPRO) 10 mg, Oral, Daily    fenofibrate (TRIGLIDE) 160 mg, Oral, Daily    furosemide (LASIX) 20 mg, Oral, Daily    loperamide (IMODIUM) 2 mg, Oral, 2 times daily after meals    Loratadine 10 MG CAPS loratadine 10 mg capsule   Take by oral route   losartan (COZAAR) 50 mg tablet TAKE ONE TABLET BY MOUTH EVERY DAY     Misc Natural Products (OSTEO BI-FLEX ADV DOUBLE ST PO) Oral    naproxen (NAPROSYN) 500 mg, Oral, 2 times daily with meals    nicotine (NICODERM CQ) 21 mg/24 hr TD 24 hr patch 1 patch, Transdermal, Every 24 hours    omeprazole (PRILOSEC) 20 mg, Oral, Daily    potassium chloride (K-DUR,KLOR-CON) 20 mEq tablet 20 mEq, Oral, Daily    rifaximin (XIFAXAN) 550 mg, Oral, Every 8 hours scheduled       Current Outpatient Medications   Medication Instructions    ASPIRIN 81 PO 81 mg, Oral, Daily    atorvastatin (LIPITOR) 10 mg, Oral, Daily    busPIRone (BUSPAR) 5 mg, Oral, 3 times daily    colestipol (COLESTID) 2 g, Oral, 2 times daily    escitalopram (LEXAPRO) 10 mg, Oral, Daily    fenofibrate (TRIGLIDE) 160 mg, Oral, Daily    furosemide (LASIX) 20 mg, Oral, Daily    loperamide (IMODIUM) 2 mg, Oral, 2 times daily after meals    Loratadine 10 MG CAPS loratadine 10 mg capsule   Take by oral route      losartan (COZAAR) 50 mg tablet TAKE ONE TABLET BY MOUTH EVERY DAY     Misc Natural Products (OSTEO BI-FLEX ADV DOUBLE ST PO) Oral    naproxen (NAPROSYN) 500 mg, Oral, 2 times daily with meals    nicotine (NICODERM CQ) 21 mg/24 hr TD 24 hr patch 1 patch, Transdermal, Every 24 hours    omeprazole (PRILOSEC) 20 mg, Oral, Daily    potassium chloride (K-DUR,KLOR-CON) 20 mEq tablet 20 mEq, Oral, Daily    rifaximin (XIFAXAN) 550 mg, Oral, Every 8 hours scheduled       Physical Exam  Vitals reviewed  Constitutional:       Appearance: Normal appearance  She is obese  HENT:      Head: Normocephalic  Nose: Nose normal       Mouth/Throat:      Mouth: Mucous membranes are moist       Pharynx: Oropharynx is clear  Eyes:      Extraocular Movements: Extraocular movements intact  Pupils: Pupils are equal, round, and reactive to light  Cardiovascular:      Rate and Rhythm: Normal rate and regular rhythm  Pulmonary:      Effort: Pulmonary effort is normal       Breath sounds: Normal breath sounds  Musculoskeletal:         General: Normal range of motion  Skin:     General: Skin is warm and dry  Neurological:      General: No focal deficit present  Mental Status: She is alert and oriented to person, place, and time  Psychiatric:         Mood and Affect: Mood is anxious and depressed  Behavior: Behavior normal          Thought Content:  Thought content normal          Judgment: Judgment normal          Tammy Cerna, [facility]

## 2021-06-21 NOTE — ASSESSMENT & PLAN NOTE
Patient continues to have chronic neck pain due to degenerative changes  She had been taking Motrin and Aleve  She is requesting a prescription for Motrin be sent to her pharmacy  I did have discussion with the patient regarding her chronic kidney disease and nephro toxic medications  I did recommend that she take Tylenol Arthritis over-the-counter  In addition she could try Osteo Bi-Flex triple strength, turmeric and tart cherry

## 2021-06-21 NOTE — ASSESSMENT & PLAN NOTE
Patient's last hemoglobin A1c is 6 1  Hemoglobin A1c has been ordered  Information was provided to the patient regarding prediabetes

## 2021-06-21 NOTE — ASSESSMENT & PLAN NOTE
Lab Results   Component Value Date    EGFR 74 02/20/2021    EGFR 94 12/18/2020    EGFR 103 12/17/2020    CREATININE 0 83 02/20/2021    CREATININE 0 63 12/18/2020    CREATININE 0 49 (L) 12/17/2020     Will continue to monitor  Information was provided to the patient regarding chronic kidney disease

## 2021-06-21 NOTE — PATIENT INSTRUCTIONS
COVID vaccine [id number] ([phone])      Osteo biflex triple strength twice daily  Turmeric 400 mg twice daily  Tart cherry 1000mg daily    Chronic Kidney Disease   WHAT YOU NEED TO KNOW:   Chronic kidney disease (CKD) is the gradual and permanent loss of kidney function  It is also called chronic kidney failure, or chronic renal insufficiency  Normally, the kidneys remove fluid, chemicals, and waste from your blood  These wastes are turned into urine by your kidneys  CKD may worsen over time and lead to kidney failure  Your CKD team will help you and your family plan for your care at home  The team will help you create goals and find ways to meet your goals  Your care plan may change over time as your needs change  DISCHARGE INSTRUCTIONS:   Call your local emergency number (911 in the [address] [facility]) if:   · You have a seizure  · You have shortness of breath  Call your doctor or nephrologist if:   · You are confused and very drowsy  · You suddenly gain or lose more weight than your healthcare provider has told you is okay  · You have itchy skin or a rash  · You urinate more or less than you normally do  · You have blood in your urine  · You have nausea and are vomiting  · You have fatigue or muscle weakness  · You have hiccups that will not stop  · You have questions or concerns about your condition or care  Medicines:   · Medicines  may be given to decrease blood pressure and get rid of extra fluid  You may also receive medicine to manage health conditions that may occur with CKD, such as anemia, diabetes, and heart disease  · Take your medicine as directed  Contact your healthcare provider if you think your medicine is not helping or if you have side effects  Tell him or her if you are allergic to any medicine  Keep a list of the medicines, vitamins, and herbs you take  Include the amounts, and when and why you take them   Bring the list or the pill bottles to follow-up visits  Carry your medicine list with you in case of an emergency  What you can do to manage CKD: Management may include making some lifestyle changes  Tell your healthcare provider if you have any concerns about being able to make the changes  He or she can help you find solutions, including working with specialists  Ask for help creating a plan to break large goals into smaller steps  Your plan may include any of the following:  · Manage other health conditions  Your healthcare provider will work with you to make a care plan that meets your needs  You will be checked regularly for heart disease or other conditions that can make CKD worse, such as diabetes  Your blood pressure will be closely monitored  You will also get a target blood pressure and help making a plan to reach your target  This may include taking your blood pressure at home  · Maintain a healthy weight  Extra weight can strain your kidneys  Ask what a healthy weight is for you  Your provider can help you create a weight loss plan if you are overweight  · Create an exercise plan  Regular exercise can help you manage CKD, high blood pressure, and diabetes  Exercise also helps control weight  Your provider can help you create exercise goals and a plan to reach those goals  For example, your goal may be to exercise for 30 minutes in a day  Your plan can include breaking exercise into 10 minute sessions, 3 times during the day  · Create a healthy eating plan  Your provider may tell you to eat food low in sodium (salt), potassium, phosphorus, or protein  A dietitian can help you plan meals if needed  Ask how much liquid to drink each day and which liquids are best for you  · Limit alcohol as directed  Alcohol can cause fluid retention and can affect your kidneys  Ask how much alcohol is safe for you  A drink of alcohol is 12 ounces of beer, 5 ounces of wine, or 1½ ounces of liquor  · Do not smoke    Nicotine and other chemicals in cigarettes and cigars can cause kidney damage  Ask your provider for information if you currently smoke and need help to quit  E-cigarettes or smokeless tobacco still contain nicotine  Talk to your provider before you use these products  · Ask about over-the-counter medicines  Medicines such as NSAIDs and laxatives may harm your kidneys  Some cough and cold medicines can raise your blood pressure  Always ask if a medicine is safe before you take it  · Ask about vaccines you may need  Infections such as pneumonia, influenza, and hepatitis can be more harmful or more likely to occur in a person who has CKD  Vaccines lower your risk for infection  Follow up with your doctor as directed: You will need to return for tests to monitor your kidney and nerve function, and your parathyroid hormone level  Your medicines may be changed, based on certain test results  You may also be referred to a nephrologist (kidney specialist)  Write down your questions so you remember to ask them during your visits  © Copyright [address] Information is for End User's use only and may not be sold, redistributed or otherwise used for commercial purposes  All illustrations and images included in CareNotes® are the copyrighted property of A D A M , Inc  or [address] [place] [address]  The above information is an  only  It is not intended as medical advice for individual conditions or treatments  Talk to your doctor, nurse or pharmacist before following any medical regimen to see if it is safe and effective for you  Chronic Kidney Disease Diet   AMBULATORY CARE:   A chronic kidney disease (CKD) diet  limits protein, phosphorus, sodium, and potassium  Liquids may also need to be limited in later stages of CKD  This diet can help slow down the rate of damage to your kidneys  Your diet may change over time as your health condition changes   You may also need to make other diet changes if you have other health problems, such as diabetes  Diet changes: There are 5 stages of CKD  The diet changes you need to make are based on your stage of kidney disease  Work with your dietitian or healthcare provider to plan meals that are right for you  You may need any of the following:  · Limit protein  in all stages of kidney disease  Limit the portion sizes of protein you eat to limit the amount of work your kidneys have to do  Foods that are high in protein are meat, poultry (chicken and turkey), fish, eggs, and dairy (milk, cheese, yogurt)  Your healthcare provider will tell you how much protein to eat each day  · Limit sodium  if you have high blood pressure  Limit your sodium intake to less than 2,300 milligrams (mg) each day  Ask your dietitian or healthcare provider how much sodium you can have each day  The amount of sodium you should have depends on your stage of kidney disease  Table salt, canned foods, soups, salted snacks, and processed meats, like deli meats and sausage, are high in sodium  · Limit the amount of phosphorus  you eat  Your kidneys cannot get rid of extra phosphorus that builds up in your blood  This may cause your bones to lose calcium and weaken  Foods that are high in phosphorus are dairy products, beans, peas, nuts, and whole grains  Phosphorus is also found in cocoa, beer, and cola drinks  Your healthcare provider will tell you how much phosphorus you can have each day  · Limit potassium  if your potassium blood levels are too high  Your dietitian or healthcare provider will tell you if you need to limit potassium  Potassium is found in fruits and vegetables  · Limit liquids  as directed  Your healthcare provider may recommend that you limit liquids in stages 4 and 5 of kidney disease  If your body retains fluids, you will have swelling and fluid may build up in your lungs  This can cause other health problems, such as shortness of breath  Foods you may include:   Your dietitian will tell you how many servings you can have from each of the food groups below  The approximate amount of these nutrients is listed next to each food group  Read the food label to find the exact amount  · Bread, cereal, and grains: These foods contain about 80 calories, 2 grams (g) of protein, 150 mg of sodium, 50 mg of potassium, and 30 mg of phosphorus  ? 1 slice (1 ounce) of bread ([place], [place], raisin, light rye, or sourdough white), small dinner roll, or 6-inch tortilla    ? ½ of a hamburger bun, hot dog bun, or English muffin or ¼ of a bagel    ? 1 cup of unsweetened cereal or ½ cup of cooked cereal, such as cream of wheat    ? ? cup of cooked pasta (noodles, macaroni, or spaghetti) or rice    ? 4 (2-inch) unsalted crackers or 3 squares of faraz crackers    ? 3 cups of air-popped, unsalted popcorn    ? ¾ ounce of unsalted pretzels    · Vegetables:  A serving of these foods contains about 30 calories, 2 g of protein, 50 mg of sodium, and 50 mg of phosphorus  ? Low potassium (less than 150 mg):      ? ½ cup cooked green beans, cabbage, cauliflower, beets, or corn    ? 1 cup raw cucumber, endive, alfalfa sprouts, cabbage, cauliflower, or watercress    ? 1 cup of all types of lettuce    ? ¼ cup cooked or ½ cup raw mushrooms or onions    ? 1 cup cooked eggplant    ? Medium potassium (150 to 250 mg):      ? 1 cup raw broccoli, celery, or zucchini    ? ½ cup cooked asparagus, broccoli, celery, green peas, summer squash, zucchini, or peppers    ? 1 cup cooked kale or turnips    · Fruits:  A serving of these foods contains about 60 calories, 0 g protein, 0 mg sodium, and 150 mg of phosphorus  Each serving is ½ cup, unless another amount is given  ? Low potassium (less than 150 mg): ? Apple juice, applesauce, or 1 small apple    ? Blueberries    ? Cranberries or cranberry juice cocktail    ? Fresh or canned pears (light syrup or packed in water)    ? Grapes or grape juice    ?  Canned peaches (light syrup or packed in water)    ? Pineapple or strawberries    ? 1 tangerine     ? Watermelon    ? Medium potassium (150 to 250 mg):      ? Fresh peaches or pears    ? Cherries    ? Cantaloupe, crissy, or papaya    ? Grapefruit or grapefruit juice    · Meat, poultry, and fish: These foods have about 75 calories, 7 g of protein, an average of 65 mg of sodium, 115 mg of potassium, and 70 mg of phosphorus  Do not use salt to prepare these foods  ? 1 ounce of cooked beef, pork, or poultry    ? 1 ounce of any fresh or frozen fish, lobster, shrimp, crab, clams, tuna, unsalted canned salmon, or unsalted sardines    · Other protein foods: These foods have about 90 calories, 7 g of protein, an average of 100 mg of sodium, 100 mg of potassium, and 120 mg of phosphorus  ? 1 large whole egg or ¼ cup of low-cholesterol egg substitute    ? 1 ounce of cheese    ? ¼ cup of cottage cheese or tofu    ? 1 ounce of unsalted nuts or 2 tablespoons of peanut butter    · Fats: These foods have very little protein and about 45 calories, 55 mg of sodium, 10 mg of potassium, and 5 mg of phosphorus  Include healthy fats, such as unsaturated fats, which are listed below  ? 1 teaspoon margarine or mayonnaise     ? 1 teaspoon oil (safflower, sunflower, corn, soybean, olive, peanut, canola)    ? 1 tablespoon oil-based salad dressing (such as [place]) or 2 tablespoons mayonnaise-based salad dressing (such as ranch)    Foods to limit or avoid:   · Starches: The following foods have higher amounts of sodium, potassium, or phosphorus  ? Biscuits, muffins, pancakes, and waffles     ? Cake and cornbread from boxed mixes    ? Oatmeal and whole-wheat cereals    ? Salted pretzel sticks or rings and sandwich cookies    · Meat and protein foods: The following are high in sodium and phosphorus  ? Deli-style meat, such as roast beef, ham, and turkey    ? Canned salmon and sardines    ?  Processed cheese, such as American cheese and cheese spreads    ? Smoked or cured meat, such as corned beef, solis, ham, hot dogs, and sausage    · Legumes: These foods have about 90 calories, 6 g of protein, less than 10 mg of sodium, 250 mg of potassium, and 100 mg of phosphorus  ? ? cup of black beans, red kidney beans, black-eye peas, garbanzos, and lentils    ? ¼ cup of green or mature soybeans    · Dairy: The following foods have about 8 g of protein, an average of 120 mg of sodium, 350 mg of potassium, and 220 mg of phosphorus  ? 1 cup of milk (fat-free, low-fat, whole, buttermilk, or chocolate milk)    ? 1 cup of low-fat plain or sugar-free yogurt or ice cream    ? ½ cup of pudding or custard    ? Nondairy milk substitutes: These foods have 75 calories, 1 gram of protein, and an average of 40 mg of sodium, 60 mg of potassium, and 60 mg of phosphorus  A serving is ½ cup of almond, rice, or soy milk, or nondairy creamer  · Vegetables: The following vegetables are high in potassium  Each serving has more than 250 mg of potassium  A serving is ½ cup, unless another amount is given  ? Artichoke or ¼ of a medium avocado    ? [place] sprouts, beets, chard, andres or mustard greens    ? Potatoes, sweet potatoes, pumpkin, and yams    ? ¾ cup of okra    ? Raw tomatoes and low-sodium tomato juice, or tomato sauce    ? Winter squash, cooked asparagus, and cooked spinach    · Fruit:  The following fruits are high in potassium  Each serving has more than 250 mg of potassium  ? 3 fresh apricots    ? 1 small nectarine (2 inches across)    ? 1 small orange and ½ cup of orange juice    ? ¼ cup of dates     ? ? of a small honeydew melon    ? 1 six-inch banana     ? ½ cup of prune juice or prunes and kiwifruit    · Fats:  Limit unhealthy fats, such as saturated fats, which are listed below  ? Butter, lard, cream cheese, whipped cream, and sour cream    ? Powdered coffee creamer    · Other: The following foods are high in sodium  ?  Frozen dinners, soups, and fast foods, such as hamburgers and pizza (see the food label for serving sizes)    ? Table salt and seasoned salts, such as onion or garlic salt    ? Barbecue sauce, ketchup, mustard, soy sauce, steak sauce, and teriyaki sauce    Contact your healthcare provider if:   · You are gaining or losing weight very quickly  · You have shortness of breath  · You have nausea and are vomiting  · You feel very weak and tired  · You are having trouble following the CKD diet  © Copyright [address] Information is for End User's use only and may not be sold, redistributed or otherwise used for commercial purposes  All illustrations and images included in CareNotes® are the copyrighted property of [employer]  or [facility] Eamon Carter   The above information is an  only  It is not intended as medical advice for individual conditions or treatments  Talk to your doctor, nurse or pharmacist before following any medical regimen to see if it is safe and effective for you  DASH Eating Plan   WHAT YOU NEED TO KNOW:   The DASH (Dietary Approaches to Stop Hypertension) Eating Plan is designed to help prevent or lower high blood pressure  It can also help to lower LDL (bad) cholesterol and decrease your risk of heart disease  The plan is low in sodium, sugar, unhealthy fats, and total fat  It is high in potassium, calcium, magnesium, and fiber  These nutrients are added when you eat more fruits, vegetables, and whole grains  DISCHARGE INSTRUCTIONS:   Your sodium limit each day: Your dietitian will tell you how much sodium is safe for you to have each day  People with high blood pressure should have no more than 1,500 to 2,300 mg of sodium in a day  A teaspoon (tsp) of salt has 2,300 mg of sodium  This may seem like a difficult goal, but small changes to the foods you eat can make a big difference   Your healthcare provider or dietitian can help you create a meal plan that follows your sodium limit  How to limit sodium:   · Read food labels  Food labels can help you choose foods that are low in sodium  The amount of sodium is listed in milligrams (mg)  The % Daily Value (DV) column tells you how much of your daily needs are met by 1 serving of the food for each nutrient listed  Choose foods that have less than 5% of the DV of sodium  These foods are considered low in sodium  Foods that have 20% or more of the DV of sodium are considered high in sodium  Avoid foods that have more than 300 mg of sodium in each serving  Choose foods that say low-sodium, reduced-sodium, or no salt added on the food label  · Avoid salt  Do not salt food at the table, and add very little salt to foods during cooking  Use herbs and spices, such as onions, garlic, and salt-free seasonings to add flavor to foods  Try lemon or lime juice or vinegar to give foods a tart flavor  Use hot peppers or a small amount of hot pepper sauce to add a spicy flavor to foods  · Ask about salt substitutes  Ask your healthcare provider if you may use salt substitutes  Some salt substitutes have ingredients that can be harmful if you have certain health conditions  · Choose foods carefully at restaurants  Meals from restaurants, especially fast food restaurants, are often high in sodium  Some restaurants have nutrition information that tells you the amount of sodium in their foods  Ask to have your food prepared with less, or no salt  What you need to know about fats:   · Include healthy fats  Examples are unsaturated fats and omega-3 fatty acids  Unsaturated fats are found in soybean, canola, olive, or sunflower oil, and liquid and soft tub margarines  Omega-3 fatty acids are found in fatty fish, such as salmon, tuna, mackerel, and sardines  It is also found in flaxseed oil and ground flaxseed  · Avoid unhealthy fats  Do not eat unhealthy fats, such as saturated fats and trans fats   Saturated fats are found in foods that contain fat from animals  Examples are fatty meats, whole milk, butter, cream, and other dairy foods  It is also found in shortening, stick margarine, palm oil, and coconut oil  Trans fats are found in fried foods, crackers, chips, and baked goods made with margarine or shortening  Foods to include: With the DASH eating plan, you need to eat a certain number of servings from each food group  This will help you get enough of certain nutrients and limit others  The amount of servings you should eat depends on how many calories you need  Your dietitian can tell you how many calories you need  The number of servings listed next to the food groups below are for people who need about 2,000 calories each day  · Grains:  6 to 8 servings (3 of these servings should be whole-grain foods)    ? 1 slice of whole-grain bread     ? 1 ounce of dry cereal    ? ½ cup of cooked cereal, pasta, or brown rice    · Vegetables and fruits:  4 to 5 servings of fruits and 4 to 5 servings of vegetables    ? 1 medium fruit    ? ½ cup of frozen, canned (no added salt), or chopped fresh vegetables     ? ½ cup of fresh, frozen, dried, or canned fruit (canned in light syrup or fruit juice)    ? ½ cup of vegetable or fruit juice    · Dairy:  2 to 3 servings    ? 1 cup of nonfat (skim) or 1% milk    ? 1½ ounces of fat-free or low-fat cheese    ? 6 ounces of nonfat or low-fat yogurt    · Lean meat, poultry, and fish:  6 ounces or less    ? Poultry (chicken, turkey) with no skin    ? Fish (especially fatty fish, such as salmon, fresh tuna, or mackerel)    ? Lean beef and pork (loin, round, extra lean hamburger)    ? Egg whites and egg substitutes    · Nuts, seeds, and legumes:  4 to 5 servings each week    ? ½ cup of cooked beans and peas    ? 1½ ounces of unsalted nuts    ? 2 tablespoons of peanut butter or seeds    · Sweets and added sugars:  5 or less each week    ?  1 tablespoon of sugar, jelly, or jam    ? ½ cup of sorbet or gelatin    ? 1 cup of lemonade    · Fats:  2 to 3 servings each week    ? 1 teaspoon of soft margarine or vegetable oil    ? 1 tablespoon of mayonnaise    ? 2 tablespoons of salad dressing    Foods to avoid:   · Grains:      ? Baked goods, such as doughnuts, pastries, cookies, and biscuits (high in fat and sugar)    ? Mixes for cornbread and biscuits, packaged foods, such as bread stuffing, rice and pasta mixes, macaroni and cheese, and instant cereals (high in sodium)    · Fruits and vegetables:      ? Regular, canned vegetables (high in sodium)    ? Sauerkraut, pickled vegetables, and other foods prepared in brine (high in sodium)    ? Fried vegetables or vegetables in butter or high-fat sauces    ? Fruit in cream or butter sauce (high in fat)    · Dairy:      ? Whole milk, 2% milk, and cream (high in fat)    ? Regular cheese and processed cheese (high in fat and sodium)    · Meats and protein foods:      ? Smoked or cured meat, such as corned beef, solis, ham, hot dogs, and sausage (high in fat and sodium)    ? Canned beans and canned meats or spreads, such as potted meats, sardines, anchovies, and imitation seafood (high in sodium)    ? Deli or lunch meats, such as bologna, ham, turkey, and roast beef (high in sodium)    ? High-fat meat (T-bone steak, regular hamburger, and ribs)    ? Whole eggs and egg yolks (high in fat)    · Other:      ? Seasonings made with salt, such as garlic salt, celery salt, onion salt, seasoned salt, meat tenderizers, and monosodium glutamate (MSG)    ? Miso soup and canned or dried soup mixes (high in sodium)    ? Regular soy sauce, barbecue sauce, teriyaki sauce, steak sauce, Worcestershire sauce, and most flavored vinegars (high in sodium)    ? Regular condiments, such as mustard, ketchup, and salad dressings (high in sodium)    ? Gravy and sauces, such as Franco or cheese sauces (high in sodium and fat)    ? Drinks high in sugar, such as soda or fruit drinks    ?  Snack foods, such as salted chips, popcorn, pretzels, pork rinds, salted crackers, and salted nuts    ? Frozen foods, such as dinners, entrees, vegetables with sauces, and breaded meats (high in sodium)    Other guidelines to follow:   · Maintain a healthy weight  Your risk for heart disease is higher if you are overweight  Your healthcare provider may suggest that you lose weight if you are overweight  You can lose weight by eating fewer calories and foods that have added sugars and fat  The DASH meal plan can help you do this  Decrease calories by eating smaller portions at each meal and fewer snacks  Ask your healthcare provider for more information about how to lose weight  · Exercise regularly  Regular exercise can help you reach or maintain a healthy weight  Regular exercise can also help decrease your blood pressure and improve your cholesterol levels  Get 30 minutes or more of moderate exercise each day of the week  To lose weight, get at least 60 minutes of exercise  Talk to your healthcare provider about the best exercise program for you  · Limit alcohol  Women should limit alcohol to 1 drink a day  Men should limit alcohol to 2 drinks a day  A drink of alcohol is 12 ounces of beer, 5 ounces of wine, or 1½ ounces of liquor  © Copyright [address] [place] [address] Drive Information is for End User's use only and may not be sold, redistributed or otherwise used for commercial purposes  All illustrations and images included in CareNotes® are the copyrighted property of A D A M [employer] Inc  or [facility] Eamon Carter   The above information is an  only  It is not intended as medical advice for individual conditions or treatments  Talk to your doctor, nurse or pharmacist before following any medical regimen to see if it is safe and effective for you  Hypertension   WHAT YOU NEED TO KNOW:   Hypertension is high blood pressure  Your blood pressure is the force of your blood moving against the walls of your arteries  Hypertension causes your blood pressure to get so high that your heart has to work much harder than normal  This can damage your heart  The cause of hypertension may not be known  This is called essential or primary hypertension  Hypertension caused by another medical condition, such as kidney disease, is called secondary hypertension  DISCHARGE INSTRUCTIONS:   Call 911 for any of the following:   · You have chest pain  · You have any of the following signs of a heart attack:      ? Squeezing, pressure, or pain in your chest    ? You may  also have any of the following:     § Discomfort or pain in your back, neck, jaw, stomach, or arm    § Shortness of breath    § Nausea or vomiting    § Lightheadedness or a sudden cold sweat    · You become confused or have difficulty speaking  · You suddenly feel lightheaded or have trouble breathing  Return to the emergency department if:   · You have a severe headache or vision loss  · You have weakness in an arm or leg  Contact your healthcare provider if:   · You feel faint, dizzy, confused, or drowsy  · You have been taking your blood pressure medicine but your pressure is higher than your provider says it should be  · You have questions or concerns about your condition or care  Medicines: You may  need any of the following:  · Antihypertensives  may be used to help lower your blood pressure  Several kinds of medicines are available  Your healthcare provider will choose medicines based on the kind of hypertension you have  You may need more than one type of medicine  Take the medicine exactly as directed  · Diuretics  help decrease extra fluid that collects in your body  This will help lower your blood pressure  You may urinate more often while you take this medicine  · Cholesterol medicine  helps lower your cholesterol level  A low cholesterol level helps prevent heart disease and makes it easier to control your blood pressure       · Take your medicine as directed  Contact your healthcare provider if you think your medicine is not helping or if you have side effects  Tell him or her if you are allergic to any medicine  Keep a list of the medicines, vitamins, and herbs you take  Include the amounts, and when and why you take them  Bring the list or the pill bottles to follow-up visits  Carry your medicine list with you in case of an emergency  Follow up with your healthcare provider as directed: You will need to return to have your blood pressure checked and to have other lab tests done  Write down your questions so you remember to ask them during your visits  Stages of hypertension:       · Normal blood pressure is 119/79 or lower   Your healthcare provider may only check your blood pressure each year if it stays at a normal level  · Elevated blood pressure is 120/79 to 129/79   This is sometimes called prehypertension  Your healthcare provider may suggest lifestyle changes to help lower your blood pressure to a normal level  He or she may then check it again in 3 to 6 months  · Stage 1 hypertension is 130/80  to 139/89   Your provider may recommend lifestyle changes, medication, and checks every 3 to 6 months until your blood pressure is controlled  · Stage 2 hypertension is 140/90 or higher   Your provider will recommend lifestyle changes and have you take 2 kinds of hypertension medicines  You will also need to have your blood pressure checked monthly until it is controlled  Manage hypertension:   · Check your blood pressure at home  Avoid smoking, caffeine, and exercise at least 30 minutes before checking your blood pressure  Sit and rest for 5 minutes before you take your blood pressure  Extend your arm and support it on a flat surface  Your arm should be at the same level as your heart  Follow the directions that came with your blood pressure monitor   Check your blood pressure 2 times, 1 minute apart, before you take your medicine in the morning  Also check your blood pressure before your evening meal  Keep a record of your readings and bring it to your follow-up visits  Ask your healthcare provider what your blood pressure should be  · Manage any other health conditions you have  Health conditions such as diabetes can increase your risk for hypertension  Follow your healthcare provider's instructions and take all your medicines as directed  · Ask about all medicines  Certain medicines can increase your blood pressure  Examples include oral birth control pills, decongestants, herbal supplements, and NSAIDs, such as ibuprofen  Your healthcare provider can tell you which medicines are safe for you to take  This includes prescription and over-the-counter medicines  Lifestyle changes you can make to manage hypertension:   · Limit sodium (salt) as directed  Too much sodium can affect your fluid balance  Check labels to find low-sodium or no-salt-added foods  Some low-sodium foods use potassium salts for flavor  Too much potassium can also cause health problems  Your healthcare provider will tell you how much sodium and potassium are safe for you to have in a day  He or she may recommend that you limit sodium to 2,300 mg a day  · Follow the meal plan recommended by your healthcare provider  A dietitian or your provider can give you more information on low-sodium plans or the DASH (Dietary Approaches to Stop Hypertension) eating plan  The DASH plan is low in sodium, unhealthy fats, and total fat  It is high in potassium, calcium, and fiber  · Exercise to maintain a healthy weight  Exercise at least 30 minutes per day, on most days of the week  This will help decrease your blood pressure  Ask your healthcare provider about the best exercise plan for you  · Decrease stress  This may help lower your blood pressure  Learn ways to relax, such as deep breathing or listening to music      · Limit alcohol as directed  Alcohol can increase your blood pressure  A drink of alcohol is 12 ounces of beer, 5 ounces of wine, or 1½ ounces of liquor  · Do not smoke  Nicotine and other chemicals in cigarettes and cigars can increase your blood pressure and also cause lung damage  Ask your healthcare provider for information if you currently smoke and need help to quit  E-cigarettes or smokeless tobacco still contain nicotine  Talk to your healthcare provider before you use these products  © Copyright [address] Information is for End User's use only and may not be sold, redistributed or otherwise used for commercial purposes  All illustrations and images included in CareNotes® are the copyrighted property of A D A M , Inc  or [address] [place] [address]  The above information is an  only  It is not intended as medical advice for individual conditions or treatments  Talk to your doctor, nurse or pharmacist before following any medical regimen to see if it is safe and effective for you

## 2021-06-22 ENCOUNTER — TELEPHONE (OUTPATIENT)
Dept: INTERNAL MEDICINE CLINIC | Facility: CLINIC | Age: 66
End: 2021-06-22

## 2021-06-22 NOTE — TELEPHONE ENCOUNTER
Patient saw Fahad Sanders yesterday  Please advise patient on the Potassium Chloride 20 mEq tablet  The AVS says to change the way it is taken and to stop taking it  Is she supposed to take it or not?

## 2021-06-22 NOTE — TELEPHONE ENCOUNTER
I contacted the patient with the results of her blood work  She has been instructed to watch her intake of carbohydrates and fats this time  The patient states that she recently started he does diet  Patient does have an upcoming appointment for follow-up with Dr Liss Taylor

## 2021-06-23 LAB — BACTERIA UR CULT: NORMAL

## 2021-06-25 ENCOUNTER — TELEPHONE (OUTPATIENT)
Dept: INTERNAL MEDICINE CLINIC | Facility: CLINIC | Age: 66
End: 2021-06-25

## 2021-06-25 NOTE — TELEPHONE ENCOUNTER
LEW: Spoke with patient  History of IBS-D, colon polyps    Patient c/o 4-5 diarrhea BM since starting the xifaxan  9 days ago  She is on day 10/14 of xifaxan  Taking probiotic, and benefiber supplement  Patient also stopped her cholestyramine 2g BID and imodium PRN  Denies nausea, vomiting, fever, chills, abdominal pain  Black or blood stools  Patient will restart the cholestyramine, and imodium PRN

## 2021-07-10 DIAGNOSIS — I10 ESSENTIAL HYPERTENSION: ICD-10-CM

## 2021-07-10 RX ORDER — LOSARTAN POTASSIUM 50 MG/1
TABLET ORAL
Qty: 90 TABLET | Refills: 0 | Status: SHIPPED | OUTPATIENT
Start: 2021-07-10 | End: 2021-12-06 | Stop reason: SDUPTHER

## 2021-07-16 DIAGNOSIS — F41.9 ANXIETY AND DEPRESSION: ICD-10-CM

## 2021-07-16 DIAGNOSIS — R19.7 DIARRHEA, UNSPECIFIED TYPE: ICD-10-CM

## 2021-07-16 DIAGNOSIS — F32.A ANXIETY AND DEPRESSION: ICD-10-CM

## 2021-07-16 RX ORDER — LOPERAMIDE HYDROCHLORIDE 2 MG/1
CAPSULE ORAL
Qty: 60 CAPSULE | Refills: 0 | Status: SHIPPED | OUTPATIENT
Start: 2021-07-16 | End: 2021-11-12 | Stop reason: SDUPTHER

## 2021-07-16 RX ORDER — BUSPIRONE HYDROCHLORIDE 5 MG/1
TABLET ORAL
Qty: 90 TABLET | Refills: 0 | Status: SHIPPED | OUTPATIENT
Start: 2021-07-16 | End: 2021-07-21 | Stop reason: SDUPTHER

## 2021-07-19 ENCOUNTER — RA CDI HCC (OUTPATIENT)
Dept: OTHER | Facility: HOSPITAL | Age: 66
End: 2021-07-19

## 2021-07-19 NOTE — PROGRESS NOTES
[facility]  coding opportunities          Chart reviewed, no opportunity found: CHART REVIEWED, NO OPPORTUNITY FOUND                     Patients insurance company: [facility] [address]  (Medicare Advantage and Commercial)

## 2021-07-20 ENCOUNTER — TELEPHONE (OUTPATIENT)
Dept: INTERNAL MEDICINE CLINIC | Facility: CLINIC | Age: 66
End: 2021-07-20

## 2021-07-20 ENCOUNTER — TELEPHONE (OUTPATIENT)
Dept: GASTROENTEROLOGY | Facility: CLINIC | Age: 66
End: 2021-07-20

## 2021-07-20 NOTE — TELEPHONE ENCOUNTER
TOLD  TO  CALL  AND  LET  HOLLI  KNOW  HOW  SHE  IS  MAKING  OUT  ON   BUSPAR 5 MG   PT   SAID  SHE  IS  STILL  VERY  ANXIOUS       CALL   PT   [phone]

## 2021-07-20 NOTE — TELEPHONE ENCOUNTER
Spoke to patient  She reports no significant benefit on the Xifaxan  She is taking Colestid 2grams BID and still having 4 bowel movements a day  Will increase the Colestid to TID  Will also plan for colonoscopy to investigate  She is currently out of state on vacation visiting family but reports she will call when back to schedule the procedure

## 2021-07-21 NOTE — TELEPHONE ENCOUNTER
Pt ck'ing status of her request  she takes   busPIRone (BUSPAR) 5 mg tablet TID and is still anxious    she said Ambrose Adan told her to let her know how she is feeling with the med    is asking if it can be increased ? ? And only has enough for today

## 2021-07-22 ENCOUNTER — TELEPHONE (OUTPATIENT)
Dept: GASTROENTEROLOGY | Facility: CLINIC | Age: 66
End: 2021-07-22

## 2021-07-22 NOTE — TELEPHONE ENCOUNTER
Agree - this is what I told her when I talked to her Monday as well  We just increased the Colestid to TID on Monday  Thanks!

## 2021-07-22 NOTE — TELEPHONE ENCOUNTER
Pt called back and she said the 5 mg of the BUSPAR isn't working that well- still anxious    Can Dr Bernie Kruger up the dosage?   Please let her know and call it into [facility] [place]     Pt out of med

## 2021-07-22 NOTE — TELEPHONE ENCOUNTER
Spoke with patient  History of IBS-D, colon polyps    Patient c/o continued diarrhea Monday and Tuesday  Today she has not had a BM  She is taking imodium 1 tablet BID, and colestid TID  Patient never finished her xifaxan course and stopped this last month  Reassured patient to continue Colestid, and imodium  Patient will not be back in [place] until mid to late August Advised patient if she has severe abdominal pain, rectal bleeding, SOB, weakness, fever, chills, continuous nausea vomiting  She will need to go to the ED  She understands she needs a colonoscopy when she returns back to [place]  Any other suggestions?

## 2021-08-12 ENCOUNTER — TELEPHONE (OUTPATIENT)
Dept: CARDIAC SURGERY | Facility: CLINIC | Age: 66
End: 2021-08-12

## 2021-08-12 NOTE — TELEPHONE ENCOUNTER
Due to a death in the family  I called and l/m to inform pt I canceled her follow up appt with Dr Favian Galaviz since we will need to see her after her scan  I told pt not to worry about calling back to reschedule at this time  I will call her next week to reschedule with her

## 2021-08-24 ENCOUNTER — TELEPHONE (OUTPATIENT)
Dept: HEMATOLOGY ONCOLOGY | Facility: CLINIC | Age: 66
End: 2021-08-24

## 2021-08-24 ENCOUNTER — TELEPHONE (OUTPATIENT)
Dept: CARDIAC SURGERY | Facility: CLINIC | Age: 66
End: 2021-08-24

## 2021-08-24 NOTE — TELEPHONE ENCOUNTER
I called pt to see if she was able to reschedule her appt with Dr Abida Rand I l/m to please call us back

## 2021-08-24 NOTE — TELEPHONE ENCOUNTER
Appointment Confirmation     Appointment with  Oralai Maguire    Appointment date & time 9-21-21 @ 9:40am   Location [place]   Patient verbilized Understanding

## 2021-08-26 ENCOUNTER — TELEPHONE (OUTPATIENT)
Dept: CARDIAC SURGERY | Facility: CLINIC | Age: 66
End: 2021-08-26

## 2021-08-26 NOTE — TELEPHONE ENCOUNTER
Pt was rescheduled to see Dr Taryn Hong, however was not rescheduled for her CT scan  I called and l/m for pt to please call us back so that we can get her rescheduled  Dr Taryn Hong will want pt to have CT scan before seeing her 9/21

## 2021-09-08 ENCOUNTER — VBI (OUTPATIENT)
Dept: ADMINISTRATIVE | Facility: OTHER | Age: 66
End: 2021-09-08

## 2021-09-10 ENCOUNTER — TELEPHONE (OUTPATIENT)
Dept: CARDIAC SURGERY | Facility: CLINIC | Age: 66
End: 2021-09-10

## 2021-09-10 NOTE — TELEPHONE ENCOUNTER
Pt canceled CT scan, and appt with Dr Ayden Christy was r/s for 9/21, however pt needs to r/s CT before her appt  I did let pt know to please call us, there is still time to get her scan scheduled before her appt but if it gets too close to her appt date we may need to r/s her appt with Dr Ayden Christy until she has a CT done

## 2021-09-13 ENCOUNTER — TELEPHONE (OUTPATIENT)
Dept: CARDIAC SURGERY | Facility: CLINIC | Age: 66
End: 2021-09-13

## 2021-09-15 ENCOUNTER — TELEPHONE (OUTPATIENT)
Dept: HEMATOLOGY ONCOLOGY | Facility: CLINIC | Age: 66
End: 2021-09-15

## 2021-09-15 NOTE — TELEPHONE ENCOUNTER
Patient would like a call back to schedule an appointment  Patient states she needs to speak with Patricia Galeazzi because scheduling is difficult for her right now due to family troubles

## 2021-09-15 NOTE — TELEPHONE ENCOUNTER
Spoke with pt  She was not able to make an appt until October, due to death in the family   She has been r/s for CT scan and f/u appt with Dr Jatin Samson

## 2021-09-22 ENCOUNTER — TELEPHONE (OUTPATIENT)
Dept: GASTROENTEROLOGY | Facility: CLINIC | Age: 66
End: 2021-09-22

## 2021-09-27 ENCOUNTER — TELEPHONE (OUTPATIENT)
Dept: INTERNAL MEDICINE CLINIC | Facility: CLINIC | Age: 66
End: 2021-09-27

## 2021-09-27 NOTE — TELEPHONE ENCOUNTER
PT advised by her pharm that her Potassium chloride 20 MEQ  was cancelled   Rima Kat / Ivett Moreno)  -  I advised her that according to our files, she still has a refill of (1) and we did not cancel her potassium chloride  She is going to check w/ her pharm, relay and let us know if she has any problems      [facility]

## 2021-10-11 DIAGNOSIS — R12 HEARTBURN: ICD-10-CM

## 2021-10-11 DIAGNOSIS — E87.6 HYPOKALEMIA: ICD-10-CM

## 2021-10-11 RX ORDER — OMEPRAZOLE 20 MG/1
CAPSULE, DELAYED RELEASE ORAL
Qty: 90 CAPSULE | Refills: 0 | Status: SHIPPED | OUTPATIENT
Start: 2021-10-11 | End: 2022-01-03 | Stop reason: SDUPTHER

## 2021-10-11 RX ORDER — POTASSIUM CHLORIDE 20 MEQ/1
TABLET, EXTENDED RELEASE ORAL
Qty: 30 TABLET | Refills: 0 | Status: SHIPPED | OUTPATIENT
Start: 2021-10-11 | End: 2021-11-05 | Stop reason: SDUPTHER

## 2021-10-13 ENCOUNTER — TELEPHONE (OUTPATIENT)
Dept: CARDIAC SURGERY | Facility: CLINIC | Age: 66
End: 2021-10-13

## 2021-10-25 ENCOUNTER — TELEPHONE (OUTPATIENT)
Dept: CARDIAC SURGERY | Facility: CLINIC | Age: 66
End: 2021-10-25

## 2021-11-01 ENCOUNTER — TELEPHONE (OUTPATIENT)
Dept: SURGICAL ONCOLOGY | Facility: CLINIC | Age: 66
End: 2021-11-01

## 2021-11-05 ENCOUNTER — OFFICE VISIT (OUTPATIENT)
Dept: INTERNAL MEDICINE CLINIC | Facility: CLINIC | Age: 66
End: 2021-11-05
Payer: COMMERCIAL

## 2021-11-05 ENCOUNTER — APPOINTMENT (OUTPATIENT)
Dept: LAB | Facility: CLINIC | Age: 66
End: 2021-11-05
Payer: COMMERCIAL

## 2021-11-05 VITALS
BODY MASS INDEX: 32.51 KG/M2 | HEIGHT: 61 IN | SYSTOLIC BLOOD PRESSURE: 134 MMHG | OXYGEN SATURATION: 93 % | TEMPERATURE: 97.5 F | DIASTOLIC BLOOD PRESSURE: 80 MMHG | WEIGHT: 172.2 LBS | RESPIRATION RATE: 17 BRPM | HEART RATE: 90 BPM

## 2021-11-05 DIAGNOSIS — E78.2 MIXED HYPERLIPIDEMIA: ICD-10-CM

## 2021-11-05 DIAGNOSIS — F41.9 ANXIETY AND DEPRESSION: ICD-10-CM

## 2021-11-05 DIAGNOSIS — R19.7 DIARRHEA, UNSPECIFIED TYPE: ICD-10-CM

## 2021-11-05 DIAGNOSIS — R73.03 PREDIABETES: Primary | ICD-10-CM

## 2021-11-05 DIAGNOSIS — Z12.31 VISIT FOR SCREENING MAMMOGRAM: ICD-10-CM

## 2021-11-05 DIAGNOSIS — F32.0 CURRENT MILD EPISODE OF MAJOR DEPRESSIVE DISORDER, UNSPECIFIED WHETHER RECURRENT (HCC): ICD-10-CM

## 2021-11-05 DIAGNOSIS — R82.90 FOUL SMELLING URINE: ICD-10-CM

## 2021-11-05 DIAGNOSIS — F17.200 SMOKING ADDICTION: ICD-10-CM

## 2021-11-05 DIAGNOSIS — E87.6 HYPOKALEMIA: ICD-10-CM

## 2021-11-05 DIAGNOSIS — B35.9 TINEA: ICD-10-CM

## 2021-11-05 DIAGNOSIS — I10 PRIMARY HYPERTENSION: ICD-10-CM

## 2021-11-05 DIAGNOSIS — R73.03 PREDIABETES: ICD-10-CM

## 2021-11-05 DIAGNOSIS — N18.2 STAGE 2 CHRONIC KIDNEY DISEASE: ICD-10-CM

## 2021-11-05 DIAGNOSIS — D12.0 ADENOMA OF CECUM: ICD-10-CM

## 2021-11-05 DIAGNOSIS — F32.A ANXIETY AND DEPRESSION: ICD-10-CM

## 2021-11-05 LAB
ANION GAP SERPL CALCULATED.3IONS-SCNC: 7 MMOL/L (ref 4–13)
BACTERIA UR QL AUTO: ABNORMAL /HPF
BILIRUB UR QL STRIP: NEGATIVE
BUN SERPL-MCNC: 11 MG/DL (ref 5–25)
CALCIUM SERPL-MCNC: 9.3 MG/DL (ref 8.3–10.1)
CHLORIDE SERPL-SCNC: 105 MMOL/L (ref 100–108)
CLARITY UR: ABNORMAL
CO2 SERPL-SCNC: 27 MMOL/L (ref 21–32)
COLOR UR: YELLOW
CREAT SERPL-MCNC: 0.73 MG/DL (ref 0.6–1.3)
EST. AVERAGE GLUCOSE BLD GHB EST-MCNC: 128 MG/DL
GFR SERPL CREATININE-BSD FRML MDRD: 86 ML/MIN/1.73SQ M
GLUCOSE P FAST SERPL-MCNC: 96 MG/DL (ref 65–99)
GLUCOSE UR STRIP-MCNC: NEGATIVE MG/DL
HBA1C MFR BLD: 6.1 %
HGB UR QL STRIP.AUTO: NEGATIVE
HYALINE CASTS #/AREA URNS LPF: ABNORMAL /LPF
KETONES UR STRIP-MCNC: NEGATIVE MG/DL
LEUKOCYTE ESTERASE UR QL STRIP: ABNORMAL
NITRITE UR QL STRIP: NEGATIVE
NON-SQ EPI CELLS URNS QL MICRO: ABNORMAL /HPF
PH UR STRIP.AUTO: 6 [PH]
POTASSIUM SERPL-SCNC: 4 MMOL/L (ref 3.5–5.3)
PROT UR STRIP-MCNC: NEGATIVE MG/DL
RBC #/AREA URNS AUTO: ABNORMAL /HPF
SODIUM SERPL-SCNC: 139 MMOL/L (ref 136–145)
SP GR UR STRIP.AUTO: 1.01 (ref 1–1.03)
UROBILINOGEN UR QL STRIP.AUTO: 0.2 E.U./DL
WBC #/AREA URNS AUTO: ABNORMAL /HPF

## 2021-11-05 PROCEDURE — 87077 CULTURE AEROBIC IDENTIFY: CPT

## 2021-11-05 PROCEDURE — 83036 HEMOGLOBIN GLYCOSYLATED A1C: CPT

## 2021-11-05 PROCEDURE — 81001 URINALYSIS AUTO W/SCOPE: CPT | Performed by: NURSE PRACTITIONER

## 2021-11-05 PROCEDURE — 87086 URINE CULTURE/COLONY COUNT: CPT

## 2021-11-05 PROCEDURE — 3288F FALL RISK ASSESSMENT DOCD: CPT | Performed by: NURSE PRACTITIONER

## 2021-11-05 PROCEDURE — 1160F RVW MEDS BY RX/DR IN RCRD: CPT | Performed by: NURSE PRACTITIONER

## 2021-11-05 PROCEDURE — 80048 BASIC METABOLIC PNL TOTAL CA: CPT

## 2021-11-05 PROCEDURE — 87186 SC STD MICRODIL/AGAR DIL: CPT

## 2021-11-05 PROCEDURE — 1101F PT FALLS ASSESS-DOCD LE1/YR: CPT | Performed by: NURSE PRACTITIONER

## 2021-11-05 PROCEDURE — 3079F DIAST BP 80-89 MM HG: CPT | Performed by: NURSE PRACTITIONER

## 2021-11-05 PROCEDURE — 3075F SYST BP GE 130 - 139MM HG: CPT | Performed by: NURSE PRACTITIONER

## 2021-11-05 PROCEDURE — 36415 COLL VENOUS BLD VENIPUNCTURE: CPT

## 2021-11-05 PROCEDURE — 4004F PT TOBACCO SCREEN RCVD TLK: CPT | Performed by: NURSE PRACTITIONER

## 2021-11-05 PROCEDURE — 99214 OFFICE O/P EST MOD 30 MIN: CPT | Performed by: NURSE PRACTITIONER

## 2021-11-05 PROCEDURE — 3008F BODY MASS INDEX DOCD: CPT | Performed by: NURSE PRACTITIONER

## 2021-11-05 RX ORDER — KETOCONAZOLE 20 MG/G
CREAM TOPICAL DAILY
Qty: 15 G | Refills: 0 | Status: SHIPPED | OUTPATIENT
Start: 2021-11-05 | End: 2022-02-10 | Stop reason: SDUPTHER

## 2021-11-05 RX ORDER — MONTELUKAST SODIUM 4 MG/1
2 TABLET, CHEWABLE ORAL 3 TIMES DAILY
Qty: 180 TABLET | Refills: 0 | Status: SHIPPED | OUTPATIENT
Start: 2021-11-05 | End: 2021-12-20 | Stop reason: SDUPTHER

## 2021-11-05 RX ORDER — POTASSIUM CHLORIDE 20 MEQ/1
20 TABLET, EXTENDED RELEASE ORAL DAILY
Qty: 90 TABLET | Refills: 1 | Status: SHIPPED | OUTPATIENT
Start: 2021-11-05 | End: 2022-01-03 | Stop reason: SDUPTHER

## 2021-11-05 RX ORDER — BUSPIRONE HYDROCHLORIDE 5 MG/1
10 TABLET ORAL 3 TIMES DAILY
Qty: 90 TABLET | Refills: 3 | Status: SHIPPED | OUTPATIENT
Start: 2021-11-05 | End: 2022-02-07 | Stop reason: SDUPTHER

## 2021-11-07 LAB — BACTERIA UR CULT: ABNORMAL

## 2021-11-08 ENCOUNTER — TELEPHONE (OUTPATIENT)
Dept: INTERNAL MEDICINE CLINIC | Facility: CLINIC | Age: 66
End: 2021-11-08

## 2021-11-08 DIAGNOSIS — N30.90 CYSTITIS: Primary | ICD-10-CM

## 2021-11-08 RX ORDER — NITROFURANTOIN 25; 75 MG/1; MG/1
100 CAPSULE ORAL 2 TIMES DAILY
Qty: 10 CAPSULE | Refills: 0 | Status: SHIPPED | OUTPATIENT
Start: 2021-11-08 | End: 2021-11-10

## 2021-11-09 ENCOUNTER — TELEPHONE (OUTPATIENT)
Dept: INTERNAL MEDICINE CLINIC | Facility: CLINIC | Age: 66
End: 2021-11-09

## 2021-11-12 DIAGNOSIS — R19.7 DIARRHEA, UNSPECIFIED TYPE: ICD-10-CM

## 2021-11-12 RX ORDER — LOPERAMIDE HYDROCHLORIDE 2 MG/1
2 CAPSULE ORAL 2 TIMES DAILY WITH MEALS
Qty: 60 CAPSULE | Refills: 1 | Status: SHIPPED | OUTPATIENT
Start: 2021-11-12 | End: 2022-02-07 | Stop reason: SDUPTHER

## 2021-12-06 DIAGNOSIS — I10 ESSENTIAL HYPERTENSION: ICD-10-CM

## 2021-12-06 RX ORDER — LOSARTAN POTASSIUM 50 MG/1
50 TABLET ORAL DAILY
Qty: 90 TABLET | Refills: 0 | Status: SHIPPED | OUTPATIENT
Start: 2021-12-06 | End: 2022-01-03 | Stop reason: SDUPTHER

## 2021-12-20 DIAGNOSIS — R19.7 DIARRHEA, UNSPECIFIED TYPE: ICD-10-CM

## 2021-12-20 RX ORDER — MONTELUKAST SODIUM 4 MG/1
2 TABLET, CHEWABLE ORAL 3 TIMES DAILY
Qty: 180 TABLET | Refills: 0 | Status: SHIPPED | OUTPATIENT
Start: 2021-12-20 | End: 2022-01-18 | Stop reason: SDUPTHER

## 2021-12-29 ENCOUNTER — VBI (OUTPATIENT)
Dept: ADMINISTRATIVE | Facility: OTHER | Age: 66
End: 2021-12-29

## 2021-12-30 ENCOUNTER — TELEPHONE (OUTPATIENT)
Dept: INTERNAL MEDICINE CLINIC | Facility: CLINIC | Age: 66
End: 2021-12-30

## 2022-01-03 ENCOUNTER — TELEPHONE (OUTPATIENT)
Dept: INTERNAL MEDICINE CLINIC | Facility: CLINIC | Age: 67
End: 2022-01-03

## 2022-01-03 ENCOUNTER — TELEPHONE (OUTPATIENT)
Dept: GASTROENTEROLOGY | Facility: CLINIC | Age: 67
End: 2022-01-03

## 2022-01-03 DIAGNOSIS — E78.2 MIXED HYPERLIPIDEMIA: ICD-10-CM

## 2022-01-03 DIAGNOSIS — I10 ESSENTIAL HYPERTENSION: ICD-10-CM

## 2022-01-03 DIAGNOSIS — R12 HEARTBURN: ICD-10-CM

## 2022-01-03 DIAGNOSIS — E87.6 HYPOKALEMIA: ICD-10-CM

## 2022-01-03 RX ORDER — OMEPRAZOLE 20 MG/1
20 CAPSULE, DELAYED RELEASE ORAL DAILY
Qty: 90 CAPSULE | Refills: 0 | Status: SHIPPED | OUTPATIENT
Start: 2022-01-03 | End: 2022-02-07 | Stop reason: SDUPTHER

## 2022-01-03 RX ORDER — POTASSIUM CHLORIDE 20 MEQ/1
20 TABLET, EXTENDED RELEASE ORAL DAILY
Qty: 90 TABLET | Refills: 1 | Status: SHIPPED | OUTPATIENT
Start: 2022-01-03 | End: 2022-01-20 | Stop reason: SDUPTHER

## 2022-01-03 RX ORDER — FUROSEMIDE 20 MG/1
20 TABLET ORAL DAILY
Qty: 90 TABLET | Refills: 2 | Status: SHIPPED | OUTPATIENT
Start: 2022-01-03 | End: 2022-02-07 | Stop reason: SDUPTHER

## 2022-01-03 RX ORDER — LOSARTAN POTASSIUM 50 MG/1
50 TABLET ORAL DAILY
Qty: 90 TABLET | Refills: 0 | Status: SHIPPED | OUTPATIENT
Start: 2022-01-03 | End: 2022-02-07 | Stop reason: SDUPTHER

## 2022-01-03 RX ORDER — FENOFIBRATE 160 MG/1
160 TABLET ORAL DAILY
Qty: 90 TABLET | Refills: 3 | Status: SHIPPED | OUTPATIENT
Start: 2022-01-03 | End: 2022-02-07 | Stop reason: SDUPTHER

## 2022-01-03 RX ORDER — ATORVASTATIN CALCIUM 10 MG/1
10 TABLET, FILM COATED ORAL DAILY
Qty: 90 TABLET | Refills: 2 | Status: SHIPPED | OUTPATIENT
Start: 2022-01-03 | End: 2022-02-07 | Stop reason: SDUPTHER

## 2022-01-03 NOTE — TELEPHONE ENCOUNTER
ptn needs to cancel her colon  Her sister is having her leg amputated   She will call back when she is ready to r/s

## 2022-01-04 DIAGNOSIS — R30.0 BURNING WITH URINATION: Primary | ICD-10-CM

## 2022-01-04 NOTE — TELEPHONE ENCOUNTER
Patient is asking if Bronwyn Vega can call in a script for Keflex  She has a UTI  Also, she does not need to take Buspar any longer  How does she wean herself off of it

## 2022-01-04 NOTE — TELEPHONE ENCOUNTER
The patient needs a urinalysis and culture in the past have been ordered  I will not prescribe an antibiotic until I get these results  In addition I believe the patient is taking BuSpar 10 mg 3 times a day  She should take 10 mg twice a day this week then decrease it to 10 mg once a day for 1 week and then stop

## 2022-01-12 ENCOUNTER — TELEPHONE (OUTPATIENT)
Dept: CARDIAC SURGERY | Facility: CLINIC | Age: 67
End: 2022-01-12

## 2022-01-12 NOTE — TELEPHONE ENCOUNTER
Unable to LVM   Pt will need to r/s her follow up with Dr Alysia Freeman for after her CT scan on 1/24

## 2022-01-18 ENCOUNTER — TELEPHONE (OUTPATIENT)
Dept: INTERNAL MEDICINE CLINIC | Facility: CLINIC | Age: 67
End: 2022-01-18

## 2022-01-18 ENCOUNTER — TELEPHONE (OUTPATIENT)
Dept: HEMATOLOGY ONCOLOGY | Facility: CLINIC | Age: 67
End: 2022-01-18

## 2022-01-18 DIAGNOSIS — R19.7 DIARRHEA, UNSPECIFIED TYPE: ICD-10-CM

## 2022-01-18 NOTE — TELEPHONE ENCOUNTER
Appointment Cancellation Or Reschedule     Person calling in Patient    Provider Dr Carson Valdivia   Office Visit Date and Time  1/18/2022 1pm   Office Visit Location [facility]   Did patient want to reschedule their office appointment? If so, when was it scheduled to? yes   Is this patient calling to reschedule an infusion appointment? no   When is their next infusion appointment? N/a   Is this patient a Chemo patient? no   Reason for Cancellation or Reschedule Patient calling in to reschedule appointment with Dr Carson Valdivia   Patient stating she anguiano snot get her ct scan until appointment 1/24  Best call back number [phone]     If the patient is a treatment patient, please route this to the office nurse  If the patient is not on treatment, please route to the office MA

## 2022-01-18 NOTE — TELEPHONE ENCOUNTER
I called and spoke with pt, she is r/s with Dr Kathlean Holstein for after her CT on 1/24 at 10am on 2/15

## 2022-01-18 NOTE — TELEPHONE ENCOUNTER
Requesting RX for one touch Verio test strips     test 3 time daily      And lancets pen    sent to [facility]

## 2022-01-19 RX ORDER — MONTELUKAST SODIUM 4 MG/1
2 TABLET, CHEWABLE ORAL 3 TIMES DAILY
Qty: 180 TABLET | Refills: 0 | Status: SHIPPED | OUTPATIENT
Start: 2022-01-19 | End: 2022-02-07 | Stop reason: SDUPTHER

## 2022-01-20 ENCOUNTER — TELEPHONE (OUTPATIENT)
Dept: INTERNAL MEDICINE CLINIC | Facility: CLINIC | Age: 67
End: 2022-01-20

## 2022-01-20 RX ORDER — POTASSIUM CHLORIDE 1500 MG/1
TABLET, FILM COATED, EXTENDED RELEASE ORAL
COMMUNITY
Start: 2021-11-05 | End: 2022-02-07 | Stop reason: SDUPTHER

## 2022-01-20 NOTE — TELEPHONE ENCOUNTER
Patient states diabetic supplies were to be ordered for her:    One Touch Verio Reflex Glucometer  One Touch Verio Test Strips  One Touch Lancets    Send to [facility]

## 2022-01-20 NOTE — TELEPHONE ENCOUNTER
Patient needs test strips and lancets has not tested in 3 days, she had last appt with you can you please refill?

## 2022-01-20 NOTE — TELEPHONE ENCOUNTER
The patient is not taking any medication for diabetes at this time  There is no reason she needs to check her blood sugars    We can discuss this at her next appointment

## 2022-01-21 ENCOUNTER — TELEPHONE (OUTPATIENT)
Dept: HEMATOLOGY ONCOLOGY | Facility: CLINIC | Age: 67
End: 2022-01-21

## 2022-01-21 NOTE — TELEPHONE ENCOUNTER
Pt is very upset    she needs these items asap    she is out of them    she last saw Misha Campuzano in Nov and has f/u 02/07/22    she doesn't understand why this is not being ordered       [phone] (H)

## 2022-01-22 ENCOUNTER — TELEPHONE (OUTPATIENT)
Dept: INTERNAL MEDICINE CLINIC | Facility: CLINIC | Age: 67
End: 2022-01-22

## 2022-01-22 DIAGNOSIS — R73.03 PREDIABETES: ICD-10-CM

## 2022-01-22 RX ORDER — LANCETS
EACH MISCELLANEOUS
Qty: 100 EACH | Refills: 3 | Status: SHIPPED | OUTPATIENT
Start: 2022-01-22 | End: 2022-02-07 | Stop reason: ALTCHOICE

## 2022-01-22 NOTE — TELEPHONE ENCOUNTER
Allison put an order in yesterday for the glucometer test strips and one touch verio IQ, but no needles      Pt needs the needles for the glucose meter  Please do today she has none  Pharmacy  [facility] [address]

## 2022-01-24 ENCOUNTER — TELEPHONE (OUTPATIENT)
Dept: INTERNAL MEDICINE CLINIC | Facility: CLINIC | Age: 67
End: 2022-01-24

## 2022-01-24 DIAGNOSIS — R73.03 PREDIABETES: Primary | ICD-10-CM

## 2022-01-24 RX ORDER — BLOOD SUGAR DIAGNOSTIC
STRIP MISCELLANEOUS
Qty: 100 STRIP | Refills: 3 | Status: SHIPPED | OUTPATIENT
Start: 2022-01-24

## 2022-01-24 RX ORDER — BLOOD SUGAR DIAGNOSTIC
STRIP MISCELLANEOUS
Qty: 300 STRIP | Refills: 3 | Status: SHIPPED | OUTPATIENT
Start: 2022-01-24

## 2022-01-24 NOTE — TELEPHONE ENCOUNTER
Patient needs the One Touch Verio Test Strips sent to [facility] [place], [address] [employer]

## 2022-02-01 ENCOUNTER — RA CDI HCC (OUTPATIENT)
Dept: OTHER | Facility: HOSPITAL | Age: 67
End: 2022-02-01

## 2022-02-01 NOTE — PROGRESS NOTES
[facility]  coding opportunities       [facility] 75  coding opportunities             Chart Reviewed * (Number of) [place] suggestions sent to Provider: 2                  Patients insurance company: [employer] (Medicare Advantage and Commercial)           * Per [facility] and imaging confirms ( studies done 2020) mild emphysema ( no meds), pt denies symptoms - Mild Aortic and coronary artery calcification (no meds)  Imaging studies done 11/3/2020 and 2/12/21   J43 2 Centrilobular emphysema and I70 0 Atherosclerosis of aorta                    Patients insurance company: [employer] Ayers (Medicare Advantage and Commercial)

## 2022-02-07 ENCOUNTER — OFFICE VISIT (OUTPATIENT)
Dept: INTERNAL MEDICINE CLINIC | Facility: CLINIC | Age: 67
End: 2022-02-07
Payer: COMMERCIAL

## 2022-02-07 ENCOUNTER — APPOINTMENT (OUTPATIENT)
Dept: LAB | Facility: CLINIC | Age: 67
End: 2022-02-07
Payer: COMMERCIAL

## 2022-02-07 VITALS
BODY MASS INDEX: 33.42 KG/M2 | WEIGHT: 177 LBS | SYSTOLIC BLOOD PRESSURE: 152 MMHG | HEART RATE: 96 BPM | HEIGHT: 61 IN | DIASTOLIC BLOOD PRESSURE: 82 MMHG | TEMPERATURE: 98.1 F | RESPIRATION RATE: 16 BRPM | OXYGEN SATURATION: 98 %

## 2022-02-07 DIAGNOSIS — D12.0 ADENOMA OF CECUM: ICD-10-CM

## 2022-02-07 DIAGNOSIS — R73.03 PREDIABETES: Primary | ICD-10-CM

## 2022-02-07 DIAGNOSIS — H91.93 BILATERAL HEARING LOSS, UNSPECIFIED HEARING LOSS TYPE: ICD-10-CM

## 2022-02-07 DIAGNOSIS — R30.0 BURNING WITH URINATION: ICD-10-CM

## 2022-02-07 DIAGNOSIS — F41.9 ANXIETY AND DEPRESSION: ICD-10-CM

## 2022-02-07 DIAGNOSIS — F32.A ANXIETY AND DEPRESSION: ICD-10-CM

## 2022-02-07 DIAGNOSIS — Z12.4 CERVICAL CANCER SCREENING: ICD-10-CM

## 2022-02-07 DIAGNOSIS — R19.7 DIARRHEA, UNSPECIFIED TYPE: ICD-10-CM

## 2022-02-07 DIAGNOSIS — I10 ESSENTIAL HYPERTENSION: ICD-10-CM

## 2022-02-07 DIAGNOSIS — R53.83 FATIGUE, UNSPECIFIED TYPE: ICD-10-CM

## 2022-02-07 DIAGNOSIS — R73.03 PREDIABETES: ICD-10-CM

## 2022-02-07 DIAGNOSIS — R12 HEARTBURN: ICD-10-CM

## 2022-02-07 DIAGNOSIS — F17.200 SMOKING ADDICTION: ICD-10-CM

## 2022-02-07 DIAGNOSIS — E78.2 MIXED HYPERLIPIDEMIA: ICD-10-CM

## 2022-02-07 DIAGNOSIS — M25.519 SHOULDER PAIN: ICD-10-CM

## 2022-02-07 DIAGNOSIS — I10 PRIMARY HYPERTENSION: ICD-10-CM

## 2022-02-07 DIAGNOSIS — N18.2 STAGE 2 CHRONIC KIDNEY DISEASE: ICD-10-CM

## 2022-02-07 LAB
25(OH)D3 SERPL-MCNC: 18.8 NG/ML (ref 30–100)
ANION GAP SERPL CALCULATED.3IONS-SCNC: 5 MMOL/L (ref 4–13)
BACTERIA UR QL AUTO: ABNORMAL /HPF
BASOPHILS # BLD AUTO: 0.07 THOUSANDS/ΜL (ref 0–0.1)
BASOPHILS NFR BLD AUTO: 1 % (ref 0–1)
BILIRUB UR QL STRIP: NEGATIVE
BUN SERPL-MCNC: 14 MG/DL (ref 5–25)
CALCIUM SERPL-MCNC: 9.6 MG/DL (ref 8.3–10.1)
CHLORIDE SERPL-SCNC: 106 MMOL/L (ref 100–108)
CHOLEST SERPL-MCNC: 153 MG/DL
CLARITY UR: CLEAR
CO2 SERPL-SCNC: 26 MMOL/L (ref 21–32)
COLOR UR: ABNORMAL
CREAT SERPL-MCNC: 0.72 MG/DL (ref 0.6–1.3)
EOSINOPHIL # BLD AUTO: 0.32 THOUSAND/ΜL (ref 0–0.61)
EOSINOPHIL NFR BLD AUTO: 3 % (ref 0–6)
ERYTHROCYTE [DISTWIDTH] IN BLOOD BY AUTOMATED COUNT: 12.9 % (ref 11.6–15.1)
GFR SERPL CREATININE-BSD FRML MDRD: 87 ML/MIN/1.73SQ M
GLUCOSE P FAST SERPL-MCNC: 107 MG/DL (ref 65–99)
GLUCOSE UR STRIP-MCNC: NEGATIVE MG/DL
HCT VFR BLD AUTO: 36.5 % (ref 34.8–46.1)
HDLC SERPL-MCNC: 42 MG/DL
HGB BLD-MCNC: 12.7 G/DL (ref 11.5–15.4)
HGB UR QL STRIP.AUTO: NEGATIVE
HYALINE CASTS #/AREA URNS LPF: ABNORMAL /LPF
IMM GRANULOCYTES # BLD AUTO: 0.09 THOUSAND/UL (ref 0–0.2)
IMM GRANULOCYTES NFR BLD AUTO: 1 % (ref 0–2)
KETONES UR STRIP-MCNC: NEGATIVE MG/DL
LDLC SERPL CALC-MCNC: 70 MG/DL (ref 0–100)
LEUKOCYTE ESTERASE UR QL STRIP: ABNORMAL
LYMPHOCYTES # BLD AUTO: 3.11 THOUSANDS/ΜL (ref 0.6–4.47)
LYMPHOCYTES NFR BLD AUTO: 30 % (ref 14–44)
MCH RBC QN AUTO: 30.3 PG (ref 26.8–34.3)
MCHC RBC AUTO-ENTMCNC: 34.8 G/DL (ref 31.4–37.4)
MCV RBC AUTO: 87 FL (ref 82–98)
MONOCYTES # BLD AUTO: 0.78 THOUSAND/ΜL (ref 0.17–1.22)
MONOCYTES NFR BLD AUTO: 8 % (ref 4–12)
NEUTROPHILS # BLD AUTO: 5.98 THOUSANDS/ΜL (ref 1.85–7.62)
NEUTS SEG NFR BLD AUTO: 57 % (ref 43–75)
NITRITE UR QL STRIP: NEGATIVE
NON-SQ EPI CELLS URNS QL MICRO: ABNORMAL /HPF
NRBC BLD AUTO-RTO: 0 /100 WBCS
PH UR STRIP.AUTO: 6 [PH]
PLATELET # BLD AUTO: 267 THOUSANDS/UL (ref 149–390)
PMV BLD AUTO: 10.8 FL (ref 8.9–12.7)
POTASSIUM SERPL-SCNC: 3.9 MMOL/L (ref 3.5–5.3)
PROT UR STRIP-MCNC: NEGATIVE MG/DL
RBC # BLD AUTO: 4.19 MILLION/UL (ref 3.81–5.12)
RBC #/AREA URNS AUTO: ABNORMAL /HPF
SL AMB POCT HEMOGLOBIN AIC: 6.2 (ref ?–6.5)
SODIUM SERPL-SCNC: 137 MMOL/L (ref 136–145)
SP GR UR STRIP.AUTO: 1.02 (ref 1–1.03)
TRIGL SERPL-MCNC: 205 MG/DL
TSH SERPL DL<=0.05 MIU/L-ACNC: 1.26 UIU/ML (ref 0.36–3.74)
UROBILINOGEN UR QL STRIP.AUTO: 0.2 E.U./DL
WBC # BLD AUTO: 10.35 THOUSAND/UL (ref 4.31–10.16)
WBC #/AREA URNS AUTO: ABNORMAL /HPF

## 2022-02-07 PROCEDURE — 1003F LEVEL OF ACTIVITY ASSESS: CPT | Performed by: NURSE PRACTITIONER

## 2022-02-07 PROCEDURE — 84443 ASSAY THYROID STIM HORMONE: CPT | Performed by: NURSE PRACTITIONER

## 2022-02-07 PROCEDURE — 85025 COMPLETE CBC W/AUTO DIFF WBC: CPT | Performed by: NURSE PRACTITIONER

## 2022-02-07 PROCEDURE — 36415 COLL VENOUS BLD VENIPUNCTURE: CPT | Performed by: NURSE PRACTITIONER

## 2022-02-07 PROCEDURE — 82306 VITAMIN D 25 HYDROXY: CPT | Performed by: NURSE PRACTITIONER

## 2022-02-07 PROCEDURE — 87077 CULTURE AEROBIC IDENTIFY: CPT

## 2022-02-07 PROCEDURE — 81001 URINALYSIS AUTO W/SCOPE: CPT | Performed by: NURSE PRACTITIONER

## 2022-02-07 PROCEDURE — 80061 LIPID PANEL: CPT | Performed by: NURSE PRACTITIONER

## 2022-02-07 PROCEDURE — 87086 URINE CULTURE/COLONY COUNT: CPT

## 2022-02-07 PROCEDURE — 99214 OFFICE O/P EST MOD 30 MIN: CPT | Performed by: NURSE PRACTITIONER

## 2022-02-07 PROCEDURE — 87186 SC STD MICRODIL/AGAR DIL: CPT

## 2022-02-07 PROCEDURE — 80048 BASIC METABOLIC PNL TOTAL CA: CPT | Performed by: NURSE PRACTITIONER

## 2022-02-07 PROCEDURE — 83036 HEMOGLOBIN GLYCOSYLATED A1C: CPT | Performed by: NURSE PRACTITIONER

## 2022-02-07 RX ORDER — BUSPIRONE HYDROCHLORIDE 5 MG/1
10 TABLET ORAL 3 TIMES DAILY
Qty: 90 TABLET | Refills: 3 | Status: SHIPPED | OUTPATIENT
Start: 2022-02-07 | End: 2022-04-15

## 2022-02-07 RX ORDER — FENOFIBRATE 160 MG/1
160 TABLET ORAL DAILY
Qty: 90 TABLET | Refills: 3 | Status: SHIPPED | OUTPATIENT
Start: 2022-02-07 | End: 2022-04-15 | Stop reason: SDUPTHER

## 2022-02-07 RX ORDER — OMEPRAZOLE 20 MG/1
20 CAPSULE, DELAYED RELEASE ORAL DAILY
Qty: 90 CAPSULE | Refills: 0 | Status: SHIPPED | OUTPATIENT
Start: 2022-02-07 | End: 2022-04-15 | Stop reason: SDUPTHER

## 2022-02-07 RX ORDER — LOPERAMIDE HYDROCHLORIDE 2 MG/1
2 CAPSULE ORAL 2 TIMES DAILY WITH MEALS
Qty: 60 CAPSULE | Refills: 1 | Status: SHIPPED | OUTPATIENT
Start: 2022-02-07

## 2022-02-07 RX ORDER — LANCETS 33 GAUGE
EACH MISCELLANEOUS
Qty: 100 EACH | Refills: 3 | Status: SHIPPED | OUTPATIENT
Start: 2022-02-07

## 2022-02-07 RX ORDER — MONTELUKAST SODIUM 4 MG/1
2 TABLET, CHEWABLE ORAL 3 TIMES DAILY
Qty: 180 TABLET | Refills: 0 | Status: SHIPPED | OUTPATIENT
Start: 2022-02-07 | End: 2022-06-20

## 2022-02-07 RX ORDER — POTASSIUM CHLORIDE 1500 MG/1
20 TABLET, FILM COATED, EXTENDED RELEASE ORAL DAILY
Qty: 90 TABLET | Refills: 1 | Status: SHIPPED | OUTPATIENT
Start: 2022-02-07 | End: 2022-04-15 | Stop reason: SDUPTHER

## 2022-02-07 RX ORDER — FUROSEMIDE 20 MG/1
20 TABLET ORAL DAILY
Qty: 90 TABLET | Refills: 2 | Status: SHIPPED | OUTPATIENT
Start: 2022-02-07 | End: 2022-04-15 | Stop reason: SDUPTHER

## 2022-02-07 RX ORDER — ATORVASTATIN CALCIUM 10 MG/1
10 TABLET, FILM COATED ORAL DAILY
Qty: 90 TABLET | Refills: 2 | Status: SHIPPED | OUTPATIENT
Start: 2022-02-07 | End: 2022-04-15 | Stop reason: SDUPTHER

## 2022-02-07 RX ORDER — NAPROXEN 500 MG/1
500 TABLET ORAL 2 TIMES DAILY WITH MEALS
Qty: 60 TABLET | Refills: 2 | Status: SHIPPED | OUTPATIENT
Start: 2022-02-07 | End: 2022-04-15

## 2022-02-07 RX ORDER — LOSARTAN POTASSIUM 50 MG/1
50 TABLET ORAL DAILY
Qty: 90 TABLET | Refills: 0 | Status: SHIPPED | OUTPATIENT
Start: 2022-02-07 | End: 2022-03-07

## 2022-02-07 NOTE — TELEPHONE ENCOUNTER
Patient stated that she talked to you about having these sent and she also spoke with, Huey Vale today  About it as well, please advise

## 2022-02-07 NOTE — ASSESSMENT & PLAN NOTE
The patient continues to smoke  She does follow with pulmonology    She does have an upcoming CT lung cancer screening scheduled

## 2022-02-07 NOTE — PATIENT INSTRUCTIONS
Check blood pressure every day at the same time after sitting quietly for about 5 minutes  Keep a list of blood pressures  If blood pressures continue to be over 130/90, call the office  DASH Eating Plan   WHAT YOU NEED TO KNOW:   The DASH (Dietary Approaches to Stop Hypertension) Eating Plan is designed to help prevent or lower high blood pressure  It can also help to lower LDL (bad) cholesterol and decrease your risk for heart disease  The plan is low in sodium, sugar, unhealthy fats, and total fat  It is high in potassium, calcium, magnesium, and fiber  These nutrients are added when you eat more fruits, vegetables, and whole grains  With the DASH eating plan, you need to eat a certain number of servings from each food group  This will help you get enough of certain nutrients and limit others  The amount of servings you should eat depends on how many calories you need  Your dietitian can help you create meal plans with the right number of servings for each food group  DISCHARGE INSTRUCTIONS:   What you need to know about sodium:  Your dietitian will tell you how much sodium is safe for you to have each day  People with high blood pressure should have no more than 1,500 to 2,300 mg of sodium in a day  A teaspoon (tsp) of salt has 2,300 mg of sodium  This may seem like a difficult goal, but small changes to the foods you eat can make a big difference  Your healthcare provider or dietitian can help you create a meal plan that follows your sodium limit  · Read food labels  Food labels can help you choose foods that are low in sodium  The amount of sodium is listed in milligrams (mg)  The % Daily Value (DV) column tells you how much of your daily needs are met by 1 serving of the food for each nutrient listed  Choose foods that have less than 5% of the DV of sodium  These foods are considered low in sodium  Foods that have 20% or more of the DV of sodium are considered high in sodium   Avoid foods that have more than 300 mg of sodium in each serving  Choose foods that say low-sodium, reduced-sodium, or no salt added on the food label  · Limit added salt  Do not salt food at the table if you add salt when you cook  Use herbs and spices, such as onions, garlic, and salt-free seasonings to add flavor  Try lemon or lime juice or vinegar to add a tart flavor  Use hot peppers or a small amount of hot pepper sauce to add a spicy flavor  Limit foods high in added salt, such as the following:    ? Seasonings made with salt, such as garlic salt, celery salt, onion salt, seasoned salt, meat tenderizers, and monosodium glutamate (MSG)    ? Miso soup and canned or dried soup mixes    ? Regular soy sauce, barbecue sauce, teriyaki sauce, steak sauce, Worcestershire sauce, and most flavored vinegars    ? Snack foods, such as salted chips, popcorn, pretzels, pork rinds, salted crackers, and salted nuts    ? Frozen foods, such as dinners, entrees, vegetables with sauces, and breaded meats    · Ask about salt substitutes  Ask your healthcare provider if you may use salt substitutes  Some salt substitutes have ingredients that can be harmful if you have certain health conditions  · Choose foods carefully at restaurants  Meals from restaurants, especially fast food restaurants, are often high in sodium  Some restaurants have nutrition information that tells you the amount of sodium in their foods  Ask to have your food prepared with less, or no salt  What you need to know about fats:  Healthy fats include unsaturated fats and omega-3 fatty acids  Unhealthy fats include saturated fats and trans fats  · Include healthy fats, such as the following:      ? Cooking oils, such as soybean, canola, olive, or sunflower    ? Fatty fish, such as salmon, tuna, mackerel, or sardines    ? Flaxseed oil or ground flaxseed    ? ½ cup of cooked beans, such as black beans, kidney beans, or ring beans    ?  1½ ounces of low-sodium nuts, such as almonds or walnuts    ? Low-sugar, low-sodium peanut butter    ? Seeds such as mame seeds or sunflower seeds       · Limit or do not have unhealthy fats, such as the following:      ? Foods that contain fat from animals, such as fatty meats, whole milk, butter, and cream    ? Shortening, stick margarine, palm oil, and coconut oil    ? Full-fat or creamy salad dressing    ? Creamy soup    ? Crackers, chips, and baked goods made with margarine or shortening    ? Foods that are fried in unhealthy fats    ? Gravy and sauces, such as Franco or cheese sauces    What you need to know about carbohydrates (carbs): All carbs break down into sugar  Complex carbs contain more fiber than simple carbs  This means complex carbs go into the bloodstream more slowly and cause less of a blood sugar spike  Try to include more complex carbs and fewer simple carbs  · Include complex carbs, such as the following:      ? 1 slice of whole-grain bread    ? 1 ounce of dry cereal that does not contain added sugar    ? ½ cup of cooked oatmeal    ? 2 ounces of cooked whole-grain pasta    ? ½ cup of cooked brown rice    · Limit or do not have simple carbs, such as the following:      ? Baked goods, such as doughnuts, pastries, and cookies    ? Mixes for cornbread and biscuits    ? White rice and pasta mixes, such as boxed macaroni and cheese    ? Instant and cold cereals that contain sugar    ? Jelly, jam, and ice cream that contain sugar    ? Condiments such as ketchup    ? Drinks high in sugar, such as soft drinks, lemonade, and fruit juice    What you need to know about vegetables and fruits:  Vegetables and fruits can be fresh, frozen, or canned  If possible, try to choose low-sodium canned options  · Include a variety of vegetables and fruits, such as the following:      ? 1 medium apple, pear, or peach (about ½ cup chopped)    ? ½ small banana    ? ½ cup berries, such as blueberries, strawberries, or blackberries    ?  1 cup of raw leafy greens, such as lettuce, spinach, kale, or andres greens    ? ½ cup of frozen or canned (no added salt) vegetables, such as green beans    ? ½ cup of fresh, frozen, or canned fruit (canned in light syrup or fruit juice)    ? ½ cup of vegetable or fruit juice    · Limit or do not have vegetables and fruits made in the following ways:      ? Frozen fruit such as cherries that have added sugar    ? Fruit in cream or butter sauce    ? Canned vegetables that are high in sodium    ? Sauerkraut, pickled vegetables, and other foods prepared in brine    ? Fried vegetables or vegetables in butter or high-fat sauces    What you need to know about protein foods:   · Include lean or low-fat protein foods, such as the following:      ? Poultry (chicken, turkey) with no skin    ? Fish (especially fatty fish, such as salmon, fresh tuna, or mackerel)    ? Lean beef and pork (loin, round, extra lean hamburger)    ? Egg whites and egg substitutes    ? 1 cup of nonfat (skim) or 1% milk    ? 1½ ounces of fat-free or low-fat cheese    ? 6 ounces of nonfat or low-fat yogurt    · Limit or do not have high-fat protein foods, such as the following:      ? Smoked or cured meat, such as corned beef, solis, ham, hot dogs, and sausage    ? Canned beans and canned meats or spreads, such as potted meats, sardines, anchovies, and imitation seafood    ? Deli or lunch meats, such as bologna, ham, turkey, and roast beef    ? High-fat meat (T-bone steak, regular hamburger, and ribs)    ? Whole eggs and egg yolks    ? Whole milk, 2% milk, and cream    ? Regular cheese and processed cheese    Other guidelines to follow:   · Maintain a healthy weight  Your risk for heart disease is higher if you are overweight  Your healthcare provider may suggest that you lose weight if you are overweight  You can lose weight by eating fewer calories and foods that have added sugars and fat  The DASH meal plan can help you do this   Decrease calories by eating smaller portions at each meal and fewer snacks  Ask your healthcare provider for more information about how to lose weight  · Exercise regularly  Regular exercise can help you reach or maintain a healthy weight  Regular exercise can also help decrease your blood pressure and improve your cholesterol levels  Get 30 minutes or more of moderate exercise each day of the week  To lose weight, get at least 60 minutes of exercise  Talk to your healthcare provider about the best exercise program for you  · Limit alcohol  Women should limit alcohol to 1 drink a day  Men should limit alcohol to 2 drinks a day  A drink of alcohol is 12 ounces of beer, 5 ounces of wine, or 1½ ounces of liquor  For more information:   · [facility] [address]  [address] [address]  [place] [address]  Phone: [phone] [phone] - [phone]  Web Address: [facility] no    © [facility] [facility] 2021 Information is for End User's use only and may not be sold, redistributed or otherwise used for commercial purposes  All illustrations and images included in CareNotes® are the copyrighted property of A D A M , Inc  or [address] [place] [address]  The above information is an  only  It is not intended as medical advice for individual conditions or treatments  Talk to your doctor, nurse or pharmacist before following any medical regimen to see if it is safe and effective for you  Hypertension   WHAT YOU NEED TO KNOW:   Hypertension is high blood pressure  Your blood pressure is the force of your blood moving against the walls of your arteries  Hypertension causes your blood pressure to get so high that your heart has to work much harder than normal  This can damage your heart  The cause of hypertension may not be known  This is called essential or primary hypertension   Hypertension caused by another medical condition, such as kidney disease, is called secondary hypertension  DISCHARGE INSTRUCTIONS:   Call your local emergency number (911 in the [address]) or have someone call if:   · You have chest pain  · You have any of the following signs of a heart attack:      ? Squeezing, pressure, or pain in your chest    ? You may  also have any of the following:     § Discomfort or pain in your back, neck, jaw, stomach, or arm    § Shortness of breath    § Nausea or vomiting    § Lightheadedness or a sudden cold sweat    · You become confused or have trouble speaking  · You suddenly feel lightheaded or have trouble breathing  Return to the emergency department if:   · You have a severe headache or vision loss  · You have weakness in an arm or leg  Call your doctor or cardiologist if:   · You feel faint, dizzy, confused, or drowsy  · You have been taking your blood pressure medicine but your pressure is higher than your provider says it should be  · You have questions or concerns about your condition or care  Medicines: You may  need any of the following:  · Antihypertensives  may be used to help lower your blood pressure  Several kinds of medicines are available  Your healthcare provider will choose medicines based on the kind of hypertension you have  You may need more than one type of medicine  Take the medicine exactly as directed  · Diuretics  help decrease extra fluid that collects in your body  This will help lower your blood pressure  You may urinate more often while you take this medicine  · Cholesterol medicine  helps lower your cholesterol level  A low cholesterol level helps prevent heart disease and makes it easier to control your blood pressure  · Take your medicine as directed  Contact your healthcare provider if you think your medicine is not helping or if you have side effects  Tell him or her if you are allergic to any medicine  Keep a list of the medicines, vitamins, and herbs you take   Include the amounts, and when and why you take them  Bring the list or the pill bottles to follow-up visits  Carry your medicine list with you in case of an emergency  Follow up with your doctor or cardiologist as directed: You will need to return to have your blood pressure checked and to have other lab tests done  Write down your questions so you remember to ask them during your visits  Stages of hypertension:       · Normal blood pressure is 119/79 or lower   Your healthcare provider may only check your blood pressure each year if it stays at a normal level  · Elevated blood pressure is 120/79 to 129/79   This is sometimes called prehypertension  Your healthcare provider may suggest lifestyle changes to help lower your blood pressure to a normal level  He or she may then check it again in 3 to 6 months  · Stage 1 hypertension is 130/80  to 139/89   Your provider may recommend lifestyle changes, medication, and checks every 3 to 6 months until your blood pressure is controlled  · Stage 2 hypertension is 140/90 or higher   Your provider will recommend lifestyle changes and have you take 2 kinds of hypertension medicines  You will also need to have your blood pressure checked monthly until it is controlled  Manage hypertension:   · Check your blood pressure at home  Avoid smoking, caffeine, and exercise at least 30 minutes before checking your blood pressure  Sit and rest for 5 minutes before you take your blood pressure  Extend your arm and support it on a flat surface  Your arm should be at the same level as your heart  Follow the directions that came with your blood pressure monitor  Check your blood pressure 2 times, 1 minute apart, before you take your medicine in the morning  Also check your blood pressure before your evening meal  Keep a record of your readings and bring it to your follow-up visits  Ask your healthcare provider what your blood pressure should be  · Manage any other health conditions you have    Health conditions such as diabetes can increase your risk for hypertension  Follow your healthcare provider's instructions and take all your medicines as directed  · Ask about all medicines  Certain medicines can increase your blood pressure  Examples include oral birth control pills, decongestants, herbal supplements, and NSAIDs, such as ibuprofen  Your healthcare provider can tell you which medicines are safe for you to take  This includes prescription and over-the-counter medicines  Lifestyle changes you can make to manage hypertension:  Your healthcare provider may recommend you work with a team to manage hypertension  The team may include medical experts such as a dietitian, an exercise or physical therapist, and a behavior therapist  Your family members may be included in helping you create lifestyle changes  · Limit sodium (salt) as directed  Too much sodium can affect your fluid balance  Check labels to find low-sodium or no-salt-added foods  Some low-sodium foods use potassium salts for flavor  Too much potassium can also cause health problems  Your healthcare provider will tell you how much sodium and potassium are safe for you to have in a day  He or she may recommend that you limit sodium to 2,300 mg a day  · Follow the meal plan recommended by your healthcare provider  A dietitian or your provider can give you more information on low-sodium plans or the DASH (Dietary Approaches to Stop Hypertension) eating plan  The DASH plan is low in sodium, processed sugar, unhealthy fats, and total fat  It is high in potassium, calcium, and fiber  These can be found in vegetables, fruit, and whole-grain foods  · Be physically active throughout the day  Physical activity, such as exercise, can help control your blood pressure and your weight  Be physically active for at least 30 minutes per day, on most days of the week  Include aerobic activity, such as walking or riding a bicycle   Also include strength training at least 2 times each week  Your healthcare providers can help you create a physical activity plan  · Decrease stress  This may help lower your blood pressure  Learn ways to relax, such as deep breathing or listening to music  · Limit alcohol as directed  Alcohol can increase your blood pressure  A drink of alcohol is 12 ounces of beer, 5 ounces of wine, or 1½ ounces of liquor  · Do not smoke  Nicotine and other chemicals in cigarettes and cigars can increase your blood pressure and also cause lung damage  Ask your healthcare provider for information if you currently smoke and need help to quit  E-cigarettes or smokeless tobacco still contain nicotine  Talk to your healthcare provider before you use these products  © Copyright [employer] 2021 Information is for End User's use only and may not be sold, redistributed or otherwise used for commercial purposes  All illustrations and images included in CareNotes® are the copyrighted property of A D A M , Inc  or [facility] Eamon Carter   The above information is an  only  It is not intended as medical advice for individual conditions or treatments  Talk to your doctor, nurse or pharmacist before following any medical regimen to see if it is safe and effective for you  10% - bad control"> 10% - bad control,Hemoglobin A1c (HbA1c) greater than 10% indicating poor diabetic control,Haemoglobin A1c greater than 10% indicating poor diabetic control">   Diabetes Mellitus Type 2 in Adults, Ambulatory Care   GENERAL INFORMATION:   Diabetes mellitus type 2  is a disease that affects how your body uses glucose (sugar)  Insulin helps move sugar out of the blood so it can be used for energy  Normally, when the blood sugar level increases, the pancreas makes more insulin  Type 2 diabetes develops because either the body cannot make enough insulin, or it cannot use the insulin correctly   After many years, your pancreas may stop making insulin  Common symptoms include the following:   · More hunger or thirst than usual     · Frequent urination     · Weight loss without trying     · Blurred vision  Seek immediate care for the following symptoms:   · Severe abdominal pain, or pain that spreads to your back  You may also be vomiting  · Trouble staying awake or focusing    · Shaking or sweating    · Blurred or double vision    · Breath has a fruity, sweet smell    · Breathing is deep and labored, or rapid and shallow    · Heartbeat is fast and weak  Treatment for diabetes mellitus type 2  includes keeping your blood sugar at a normal level  You must eat the right foods, and exercise regularly  You may also need medicine if you cannot control your blood sugar level with nutrition and exercise  Manage diabetes mellitus type 2:   · Check your blood sugar level  You will be taught how to check a small drop of blood in a glucose monitor  Ask your healthcare provider when and how often to check during the day  Ask your healthcare provider what your blood sugar levels should be when you check them  · Keep track of carbohydrates (sugar and starchy foods)  Your blood sugar level can get too high if you eat too many carbohydrates  Your dietitian will help you plan meals and snacks that have the right amount of carbohydrates  · Eat low-fat foods  Some examples are skinless chicken and low-fat milk  · Eat less sodium (salt)  Some examples of high-sodium foods to limit are soy sauce, potato chips, and soup  Do not add salt to food you cook  Limit your use of table salt  · Eat high-fiber foods  Foods that are a good source of fiber include vegetables, whole grain bread, and beans  · Limit alcohol  Alcohol affects your blood sugar level and can make it harder to manage your diabetes  Women should limit alcohol to 1 drink a day  Men should limit alcohol to 2 drinks a day   A drink of alcohol is 12 ounces of beer, 5 ounces of wine, or 1½ ounces of liquor  · Get regular exercise  Exercise can help keep your blood sugar level steady, decrease your risk of heart disease, and help you lose weight  Exercise for at least 30 minutes, 5 days a week  Include muscle strengthening activities 2 days each week  Work with your healthcare provider to create an exercise plan  · Check your feet each day  for injuries or open sores  Ask your healthcare provider for activities you can do if you have an open sore  · Quit smoking  If you smoke, it is never too late to quit  Smoking can worsen the problems that may occur with diabetes  Ask your healthcare provider for information about how to stop smoking if you are having trouble quitting  · Ask about your weight:  Ask healthcare providers if you need to lose weight, and how much to lose  Ask them to help you with a weight loss program  Even a 10 to 15 pound weight loss can help you manage your blood sugar level  · Carry medical alert identification  Wear medical alert jewelry or carry a card that says you have diabetes  Ask your healthcare provider where to get these items  · Ask about vaccines  Diabetes puts you at risk of serious illness if you get the flu, pneumonia, or hepatitis  Ask your healthcare provider if you should get a flu, pneumonia, or hepatitis B vaccine, and when to get the vaccine  Follow up with your healthcare provider as directed:  Write down your questions so you remember to ask them during your visits  CARE AGREEMENT:   You have the right to help plan your care  Learn about your health condition and how it may be treated  Discuss treatment options with your caregivers to decide what care you want to receive  You always have the right to refuse treatment  The above information is an  only  It is not intended as medical advice for individual conditions or treatments   Talk to your doctor, nurse or pharmacist before following any medical regimen to see if it is safe and effective for you  © 2014 [address] is for End User's use only and may not be sold, redistributed or otherwise used for commercial purposes  All illustrations and images included in CareNotes® are the copyrighted property of A D A M , Inc  or James Last  10% - bad control"> 10% - bad control,Hemoglobin A1c (HbA1c) greater than 10% indicating poor diabetic control,Haemoglobin A1c greater than 10% indicating poor diabetic control">   Diabetes Mellitus Type 2 in Adults, Ambulatory Care   GENERAL INFORMATION:   Diabetes mellitus type 2  is a disease that affects how your body uses glucose (sugar)  Insulin helps move sugar out of the blood so it can be used for energy  Normally, when the blood sugar level increases, the pancreas makes more insulin  Type 2 diabetes develops because either the body cannot make enough insulin, or it cannot use the insulin correctly  After many years, your pancreas may stop making insulin  Common symptoms include the following:   · More hunger or thirst than usual     · Frequent urination     · Weight loss without trying     · Blurred vision  Seek immediate care for the following symptoms:   · Severe abdominal pain, or pain that spreads to your back  You may also be vomiting  · Trouble staying awake or focusing    · Shaking or sweating    · Blurred or double vision    · Breath has a fruity, sweet smell    · Breathing is deep and labored, or rapid and shallow    · Heartbeat is fast and weak  Treatment for diabetes mellitus type 2  includes keeping your blood sugar at a normal level  You must eat the right foods, and exercise regularly  You may also need medicine if you cannot control your blood sugar level with nutrition and exercise  Manage diabetes mellitus type 2:   · Check your blood sugar level  You will be taught how to check a small drop of blood in a glucose monitor   Ask your healthcare provider when and how often to check during the day  Ask your healthcare provider what your blood sugar levels should be when you check them  · Keep track of carbohydrates (sugar and starchy foods)  Your blood sugar level can get too high if you eat too many carbohydrates  Your dietitian will help you plan meals and snacks that have the right amount of carbohydrates  · Eat low-fat foods  Some examples are skinless chicken and low-fat milk  · Eat less sodium (salt)  Some examples of high-sodium foods to limit are soy sauce, potato chips, and soup  Do not add salt to food you cook  Limit your use of table salt  · Eat high-fiber foods  Foods that are a good source of fiber include vegetables, whole grain bread, and beans  · Limit alcohol  Alcohol affects your blood sugar level and can make it harder to manage your diabetes  Women should limit alcohol to 1 drink a day  Men should limit alcohol to 2 drinks a day  A drink of alcohol is 12 ounces of beer, 5 ounces of wine, or 1½ ounces of liquor  · Get regular exercise  Exercise can help keep your blood sugar level steady, decrease your risk of heart disease, and help you lose weight  Exercise for at least 30 minutes, 5 days a week  Include muscle strengthening activities 2 days each week  Work with your healthcare provider to create an exercise plan  · Check your feet each day  for injuries or open sores  Ask your healthcare provider for activities you can do if you have an open sore  · Quit smoking  If you smoke, it is never too late to quit  Smoking can worsen the problems that may occur with diabetes  Ask your healthcare provider for information about how to stop smoking if you are having trouble quitting  · Ask about your weight:  Ask healthcare providers if you need to lose weight, and how much to lose  Ask them to help you with a weight loss program  Even a 10 to 15 pound weight loss can help you manage your blood sugar level       · Carry medical alert identification  Wear medical alert jewelry or carry a card that says you have diabetes  Ask your healthcare provider where to get these items  · Ask about vaccines  Diabetes puts you at risk of serious illness if you get the flu, pneumonia, or hepatitis  Ask your healthcare provider if you should get a flu, pneumonia, or hepatitis B vaccine, and when to get the vaccine  Follow up with your healthcare provider as directed:  Write down your questions so you remember to ask them during your visits  CARE AGREEMENT:   You have the right to help plan your care  Learn about your health condition and how it may be treated  Discuss treatment options with your caregivers to decide what care you want to receive  You always have the right to refuse treatment  The above information is an  only  It is not intended as medical advice for individual conditions or treatments  Talk to your doctor, nurse or pharmacist before following any medical regimen to see if it is safe and effective for you  © 2014 [address] is for End User's use only and may not be sold, redistributed or otherwise used for commercial purposes  All illustrations and images included in CareNotes® are the copyrighted property of [employer]  or [employer]  Weight Management   WHAT YOU NEED TO KNOW:   Being overweight increases your risk of health conditions such as heart disease, high blood pressure, type 2 diabetes, and certain types of cancer  It can also increase your risk for osteoarthritis, sleep apnea, and other respiratory problems  Aim for a slow, steady weight loss  Even a small amount of weight loss can lower your risk of health problems  DISCHARGE INSTRUCTIONS:   How to lose weight safely:  A safe and healthy way to lose weight is to eat fewer calories and get regular exercise    · You can lose up about 1 pound a week by decreasing the number of calories you eat by 500 calories each day  You can decrease calories by eating smaller portion sizes or by cutting out high-calorie foods  Read labels to find out how many calories are in the foods you eat  · You can also burn calories with exercise such as walking, swimming, or biking  You will be more likely to keep weight off if you make these changes part of your lifestyle  Exercise at least 30 minutes per day on most days of the week  You can also fit in more physical activity by taking the stairs instead of the elevator or parking farther away from stores  Ask your healthcare provider about the best exercise plan for you  Healthy meal plan for weight management:  A healthy meal plan includes a variety of foods, contains fewer calories, and helps you stay healthy  A healthy meal plan includes the following:     · Eat whole-grain foods more often  A healthy meal plan should contain fiber  Fiber is the part of grains, fruits, and vegetables that is not broken down by your body  Whole-grain foods are healthy and provide extra fiber in your diet  Some examples of whole-grain foods are whole-wheat breads and pastas, oatmeal, brown rice, and bulgur  · Eat a variety of vegetables every day  Include dark, leafy greens such as spinach, kale, andres greens, and mustard greens  Eat yellow and orange vegetables such as carrots, sweet potatoes, and winter squash  · Eat a variety of fruits every day  Choose fresh or canned fruit (canned in its own juice or light syrup) instead of juice  Fruit juice has very little or no fiber  · Eat low-fat dairy foods  Drink fat-free (skim) milk or 1% milk  Eat fat-free yogurt and low-fat cottage cheese  Try low-fat cheeses such as mozzarella and other reduced-fat cheeses  · Choose meat and other protein foods that are low in fat  Choose beans or other legumes such as split peas or lentils  Choose fish, skinless poultry (chicken or turkey), or lean cuts of red meat (beef or pork)  Before you cook meat or poultry, cut off any visible fat  · Use less fat and oil  Try baking foods instead of frying them  Add less fat, such as margarine, sour cream, regular salad dressing, and mayonnaise to foods  Eat fewer high-fat foods  Some examples of high-fat foods include french fries, doughnuts, ice cream, and cakes  · Eat fewer sweets  Limit foods and drinks that are high in sugar  This includes candy, cookies, regular soda, and sweetened drinks  Ways to decrease calories:   · Eat smaller portions  ? Use a small plate with smaller servings  ? Do not eat second helpings  ? When you eat at a restaurant, ask for a box and place half of your meal in the box before you eat  ? Share an entrée with someone else  · Replace high-calorie snacks with healthy, low-calorie snacks  ? Choose fresh fruit, vegetables, fat-free rice cakes, or air-popped popcorn instead of potato chips, nuts, or chocolate  ? Choose water or calorie-free drinks instead of soda or sweetened drinks  · Do not shop for groceries when you are hungry  You may be more likely to make unhealthy food choices  Take a grocery list of healthy foods and shop after you have eaten  · Eat regular meals  Do not skip meals  Skipping meals can lead to overeating later in the day  This can make it harder for you to lose weight  Eat a healthy snack in place of a meal if you do not have time to eat a regular meal  Talk with a dietitian to help you create a meal plan and schedule that is right for you  Other things to consider as you try to lose weight:   · Be aware of situations that may give you the urge to overeat, such as eating while watching television  Find ways to avoid these situations  For example, read a book, go for a walk, or do crafts  · Meet with a weight loss support group or friends who are also trying to lose weight   This may help you stay motivated to continue working on your weight loss goals     © Copyright [employer] 2021 Information is for End User's use only and may not be sold, redistributed or otherwise used for commercial purposes  All illustrations and images included in CareNotes® are the copyrighted property of [employer]  or Meg Chirinos  The above information is an  only  It is not intended as medical advice for individual conditions or treatments  Talk to your doctor, nurse or pharmacist before following any medical regimen to see if it is safe and effective for you

## 2022-02-07 NOTE — PROGRESS NOTES
[facility] [address] [place] [address]    NAME: Chetna Sandhu  AGE: 77 y o  SEX: female  : 1955     DATE: 2022     Assessment and Plan:     Problem List Items Addressed This Visit        Digestive    Adenoma of cecum      Patient had a hemicolectomy performed in 2020 at which time a 3 centimeter tubular adenoma was found  She was to have a repeat colonoscopy performed in December  She is not yet scheduled this  I did remind the patient to schedule this at her earliest convenience  Authorizing Provider: Antonella Castro MD           Collected:           2020 141               Ordering Location:     [address] [place] [address]      Received:            2020                                      Hospital Operating Room                                                       Pathologist:           Song Wade MD                                                                Specimen:    Large Intestine, Right/Ascending Colon, right hemicolectomy                                Final Diagnosis   A  Terminal ileum, appendix, right colon (resection):  - Tubular adenoma (3 cm)  - No high grade dysplasia   - Benign appendix  - Twenty-six (26) lymph nodes negative for carcinoma (0/26)  - Margins negative for dysplasia                  Cardiovascular and Mediastinum    Hypertension       Patient's blood pressure in the office today is elevated at 1 50/88  She does have some increased stress currently as she is taking care of her sister who recently had a leg amputation due to uncontrolled diabetes  The patient does have a blood pressure cuff at home  I did recommend to the patient that she start taking her blood pressures daily and record these  She will contact my office after taking pressures for 2 weeks to make me aware if they remain elevated  The patient does have transportation issues as she does not drive           Relevant Medications    furosemide (LASIX) 20 mg tablet    losartan (COZAAR) 50 mg tablet    Potassium Chloride ER 20 MEQ TBCR    Other Relevant Orders    Basic metabolic panel       Genitourinary    Stage 2 chronic kidney disease    Relevant Medications    furosemide (LASIX) 20 mg tablet       Other    Prediabetes - Primary     Lab Results   Component Value Date    HGBA1C 6 2 02/07/2022    patient's hemoglobin A1c in the office today is 6 2  The patient is no longer on antidiabetic medication  She had been on medications in the past however did lose a substantial amount of weight  She continues to check her blood sugars 3 times a day  I did make her aware that she no longer has to check them so often and she can not check a fasting glucose level in the morning  She is aware that the should remain under 110  Relevant Orders    POCT hemoglobin A1c (Completed)    Lancets    Hyperlipidemia    Relevant Medications    colestipol (COLESTID) 1 g tablet    atorvastatin (LIPITOR) 10 mg tablet    fenofibrate 160 MG tablet    Other Relevant Orders    Lipid Panel with Direct LDL reflex    Smoking addiction      The patient continues to smoke  She does follow with pulmonology  She does have an upcoming CT lung cancer screening scheduled         Fatigue       The patient has an increase in fatigue over the past several weeks  She has had vitamin-D deficiency in the past   She is taking care of her sister who recently had a leg amputation due to diabetes  Blood work has been ordered for the patient           Relevant Orders    TSH, 3rd generation with Free T4 reflex    CBC and differential    Vitamin D 25 hydroxy      Other Visit Diagnoses     Cervical cancer screening        Relevant Orders    Ambulatory Referral to Gynecology    Bilateral hearing loss, unspecified hearing loss type        Relevant Orders    Ambulatory Referral to Audiology    Shoulder pain        Relevant Medications    naproxen (NAPROSYN) 500 mg tablet    Anxiety and depression Relevant Medications    busPIRone (BUSPAR) 5 mg tablet    Diarrhea, unspecified type        Relevant Medications    colestipol (COLESTID) 1 g tablet    loperamide (IMODIUM) 2 mg capsule    Essential hypertension        Relevant Medications    furosemide (LASIX) 20 mg tablet    losartan (COZAAR) 50 mg tablet    Heartburn        Relevant Medications    omeprazole (PriLOSEC) 20 mg delayed release capsule          BMI Counseling: Body mass index is 33 44 kg/m²  The BMI is above normal  Nutrition recommendations include decreasing portion sizes, consuming healthier snacks, limiting drinks that contain sugar, moderation in carbohydrate intake, increasing intake of lean protein, reducing intake of saturated and trans fat and reducing intake of cholesterol  Exercise recommendations include exercising 3-5 times per week  Rationale for BMI follow-up plan is due to patient being overweight or obese  Return in about 18 weeks (around 6/13/2022) for recheck, Medicare wellness visit  Chief Complaint:     Chief Complaint   Patient presents with    Follow-up     3 month         History of Present Illness: The patient presents to the office today for follow-up  She feels somewhat more fatigued however she does have some increased stress  Blood work has been ordered  She is overdue for colonoscopy and I did remind her to contact her gastroenterologist to get this scheduled  She will be due for her Medicare wellness visit at her next appointment  She did receive her primary series for COVID and will be due for the booster vaccine in May  The patient declined a flu shot today in the office  She is also due for her pneumonia series and will postpone that until her next visit  Review of Systems:     Review of Systems   Constitutional: Positive for fatigue  Negative for activity change and fever  HENT: Negative for congestion, hearing loss, rhinorrhea, trouble swallowing and voice change      Eyes: Negative for photophobia, pain, discharge and visual disturbance  Respiratory: Negative for cough, chest tightness and shortness of breath  Cardiovascular: Negative for chest pain, palpitations and leg swelling  Gastrointestinal: Negative for abdominal pain, blood in stool, constipation, nausea and vomiting  Endocrine: Negative for cold intolerance and heat intolerance  Genitourinary: Negative for difficulty urinating, frequency, hematuria, urgency, vaginal bleeding and vaginal discharge  Musculoskeletal: Negative for arthralgias and myalgias  Skin: Negative  Neurological: Negative for dizziness, weakness, numbness and headaches  Psychiatric/Behavioral: Negative for decreased concentration  The patient is not nervous/anxious  Problem List:     Patient Active Problem List   Diagnosis    Prediabetes    Depression    Hyperlipidemia    Hypertension    Hypokalemia    Arthritis    BMI 32 0-32 9,adult    Smoking addiction    Adenoma of cecum    Nodule of lower lobe of right lung    Stage 2 chronic kidney disease    Tinea    Fatigue        Objective:     /82 (BP Location: Left arm, Patient Position: Sitting, Cuff Size: Standard)   Pulse 96   Temp 98 1 °F (36 7 °C) (Tympanic)   Resp 16   Ht 5' 1" (1 549 m)   Wt 80 3 kg (177 lb)   SpO2 98%   BMI 33 44 kg/m²     Physical Exam  Vitals reviewed  Constitutional:       Appearance: Normal appearance  She is obese  HENT:      Head: Normocephalic  Nose: Nose normal       Mouth/Throat:      Mouth: Mucous membranes are moist       Pharynx: Oropharynx is clear  Eyes:      Extraocular Movements: Extraocular movements intact  Pupils: Pupils are equal, round, and reactive to light  Cardiovascular:      Rate and Rhythm: Normal rate and regular rhythm  Pulmonary:      Effort: Pulmonary effort is normal       Breath sounds: Normal breath sounds  Musculoskeletal:         General: Normal range of motion     Skin: General: Skin is warm and dry  Neurological:      General: No focal deficit present  Mental Status: She is alert and oriented to person, place, and time  Psychiatric:         Mood and Affect: Mood normal          Behavior: Behavior normal          Thought Content:  Thought content normal          Judgment: Judgment normal          Severa Camera, [address]

## 2022-02-07 NOTE — ASSESSMENT & PLAN NOTE
Patient had a hemicolectomy performed in December of 2020 at which time a 3 centimeter tubular adenoma was found  She was to have a repeat colonoscopy performed in December  She is not yet scheduled this  I did remind the patient to schedule this at her earliest convenience  Authorizing Provider: Jennifer Evans MD           Collected:           12/14/2020 1419               Ordering Location:     [address] [place] [address]      Received:            12/14/2020 1941                                      Hospital Operating Room                                                       Pathologist:           Shahnaz Holley MD                                                                Specimen:    Large Intestine, Right/Ascending Colon, right hemicolectomy                                Final Diagnosis   A   Terminal ileum, appendix, right colon (resection):  - Tubular adenoma (3 cm)  - No high grade dysplasia   - Benign appendix  - Twenty-six (26) lymph nodes negative for carcinoma (0/26)  - Margins negative for dysplasia

## 2022-02-07 NOTE — ASSESSMENT & PLAN NOTE
The patient has an increase in fatigue over the past several weeks  She has had vitamin-D deficiency in the past   She is taking care of her sister who recently had a leg amputation due to diabetes  Blood work has been ordered for the patient

## 2022-02-07 NOTE — ASSESSMENT & PLAN NOTE
Lab Results   Component Value Date    HGBA1C 6 2 02/07/2022    patient's hemoglobin A1c in the office today is 6 2  The patient is no longer on antidiabetic medication  She had been on medications in the past however did lose a substantial amount of weight  She continues to check her blood sugars 3 times a day  I did make her aware that she no longer has to check them so often and she can not check a fasting glucose level in the morning  She is aware that the should remain under 110

## 2022-02-07 NOTE — ASSESSMENT & PLAN NOTE
Patient's blood pressure in the office today is elevated at 1 50/88  She does have some increased stress currently as she is taking care of her sister who recently had a leg amputation due to uncontrolled diabetes  The patient does have a blood pressure cuff at home  I did recommend to the patient that she start taking her blood pressures daily and record these  She will contact my office after taking pressures for 2 weeks to make me aware if they remain elevated  The patient does have transportation issues as she does not drive

## 2022-02-08 ENCOUNTER — HOSPITAL ENCOUNTER (OUTPATIENT)
Dept: CT IMAGING | Facility: CLINIC | Age: 67
Discharge: HOME | End: 2022-02-08
Attending: THORACIC SURGERY (CARDIOTHORACIC VASCULAR SURGERY)
Payer: COMMERCIAL

## 2022-02-08 DIAGNOSIS — R91.1 PULMONARY NODULE: ICD-10-CM

## 2022-02-08 PROCEDURE — 71250 CT THORAX DX C-: CPT

## 2022-02-08 PROCEDURE — G1004 CDSM NDSC: HCPCS

## 2022-02-09 DIAGNOSIS — E55.9 VITAMIN D DEFICIENCY: Primary | ICD-10-CM

## 2022-02-09 DIAGNOSIS — N30.90 CYSTITIS: ICD-10-CM

## 2022-02-09 LAB
BACTERIA UR CULT: ABNORMAL
BACTERIA UR CULT: ABNORMAL

## 2022-02-09 RX ORDER — ERGOCALCIFEROL 1.25 MG/1
50000 CAPSULE ORAL WEEKLY
Qty: 12 CAPSULE | Refills: 0 | Status: SHIPPED | OUTPATIENT
Start: 2022-02-09 | End: 2022-06-20

## 2022-02-09 RX ORDER — CIPROFLOXACIN 500 MG/1
500 TABLET, FILM COATED ORAL EVERY 12 HOURS SCHEDULED
Qty: 14 TABLET | Refills: 0 | Status: SHIPPED | OUTPATIENT
Start: 2022-02-09 | End: 2022-02-10

## 2022-02-10 ENCOUNTER — TELEPHONE (OUTPATIENT)
Dept: INTERNAL MEDICINE CLINIC | Facility: CLINIC | Age: 67
End: 2022-02-10

## 2022-02-10 DIAGNOSIS — B35.9 TINEA: ICD-10-CM

## 2022-02-10 RX ORDER — KETOCONAZOLE 20 MG/G
CREAM TOPICAL DAILY
Qty: 30 G | Refills: 5 | Status: SHIPPED | OUTPATIENT
Start: 2022-02-10 | End: 2022-02-10 | Stop reason: SDUPTHER

## 2022-02-10 NOTE — TELEPHONE ENCOUNTER
Cipro is not listed in the allergy profile on her chart  What reaction that she get?   I did send in Keflex

## 2022-02-10 NOTE — TELEPHONE ENCOUNTER
----- Message from Cierra Pelayo sent at 2/9/2022  6:55 PM EST -----  Please call the patient and make her aware she does have a uti  Antibiotics have been sent to her pharmacy  Her vitamin D level is low, vitamin D  50,000 units for her to take weekly for 12 weeks to her pharmacy  She should repeat vitamin D level in 12 weeks

## 2022-02-10 NOTE — TELEPHONE ENCOUNTER
Patient is allergic to Ciprofloxacin (Cipro) and would like a script for Keflex 500 mg instead      Needs a refill for Ketoconazole (Nizoral) 2% cream    Send to [facility]

## 2022-02-12 RX ORDER — KETOCONAZOLE 20 MG/G
CREAM TOPICAL DAILY
Qty: 15 G | Refills: 0 | Status: SHIPPED | OUTPATIENT
Start: 2022-02-12

## 2022-02-15 ENCOUNTER — OFFICE VISIT (OUTPATIENT)
Dept: CARDIAC SURGERY | Facility: CLINIC | Age: 67
End: 2022-02-15
Payer: COMMERCIAL

## 2022-02-15 VITALS
HEART RATE: 92 BPM | WEIGHT: 178 LBS | TEMPERATURE: 96.6 F | HEIGHT: 61 IN | SYSTOLIC BLOOD PRESSURE: 156 MMHG | RESPIRATION RATE: 16 BRPM | BODY MASS INDEX: 33.61 KG/M2 | OXYGEN SATURATION: 98 % | DIASTOLIC BLOOD PRESSURE: 80 MMHG

## 2022-02-15 DIAGNOSIS — R91.1 NODULE OF LOWER LOBE OF RIGHT LUNG: ICD-10-CM

## 2022-02-15 DIAGNOSIS — R91.1 PULMONARY NODULE: Primary | ICD-10-CM

## 2022-02-15 PROCEDURE — 1160F RVW MEDS BY RX/DR IN RCRD: CPT | Performed by: THORACIC SURGERY (CARDIOTHORACIC VASCULAR SURGERY)

## 2022-02-15 PROCEDURE — 3079F DIAST BP 80-89 MM HG: CPT | Performed by: THORACIC SURGERY (CARDIOTHORACIC VASCULAR SURGERY)

## 2022-02-15 PROCEDURE — 3077F SYST BP >= 140 MM HG: CPT | Performed by: THORACIC SURGERY (CARDIOTHORACIC VASCULAR SURGERY)

## 2022-02-15 PROCEDURE — 99213 OFFICE O/P EST LOW 20 MIN: CPT | Performed by: THORACIC SURGERY (CARDIOTHORACIC VASCULAR SURGERY)

## 2022-02-15 PROCEDURE — 4004F PT TOBACCO SCREEN RCVD TLK: CPT | Performed by: THORACIC SURGERY (CARDIOTHORACIC VASCULAR SURGERY)

## 2022-02-15 PROCEDURE — 3008F BODY MASS INDEX DOCD: CPT | Performed by: THORACIC SURGERY (CARDIOTHORACIC VASCULAR SURGERY)

## 2022-02-15 NOTE — ASSESSMENT & PLAN NOTE
Ms Edvin Rojas is a very pleasant 14-year-old female who is known to me  I have been following her for a right lower lobe pulmonary nodule  Today in the office, she is doing well  This pulmonary nodule is stable and likely of benign origin  I would like to continue following it on a yearly basis  I have scheduled her for CT scan 1 year from now and she will follow up with me at that time      Mert Lay MD  Thoracic Surgery  (Available on [employer])  Office: [phone]

## 2022-02-15 NOTE — PROGRESS NOTES
Thoracic Follow-Up  Assessment/Plan:    Nodule of lower lobe of right lung  Ms Cathleen Pascal is a very pleasant 44-year-old female who is known to me  I have been following her for a right lower lobe pulmonary nodule  Today in the office, she is doing well  This pulmonary nodule is stable and likely of benign origin  I would like to continue following it on a yearly basis  I have scheduled her for CT scan 1 year from now and she will follow up with me at that time  Niurka Morse MD  Thoracic Surgery  (Available on [employer])  Office: [phone]         Diagnoses and all orders for this visit:    Pulmonary nodule  -     CT chest wo contrast; Future    Nodule of lower lobe of right lung          Thoracic History          Subjective:    Patient ID: Patsy Guerrero is a 77 y o  female  HPI  Ms Cathleen Pascal is a very pleasant 44-year-old female who is known to me  I have been following her for a right lower lobe pulmonary nodule  On her most recent CT scan, this nodule is 1 3 x 1 2 cm and stable in size since November of 2020  I have personally reviewed all these images in PACS  Today in the office, she states that she is doing well  She denies any shortness of breath or chest pain  She denies any recent upper respiratory infection, fevers or chills  She does report that she has a significant amount of stress in her life right now due to family members with illness  She otherwise feels like she is doing fine  She denies a chronic cough  The following portions of the patient's history were reviewed and updated as appropriate: allergies, current medications, past family history, past medical history, past social history, past surgical history and problem list     Review of Systems   Constitutional: Negative for chills, fatigue, fever and unexpected weight change  HENT: Negative  Eyes: Negative  Negative for visual disturbance     Respiratory: Negative for cough, shortness of breath and stridor  Cardiovascular: Negative for chest pain  Gastrointestinal: Negative  Endocrine: Negative  Genitourinary: Negative  Musculoskeletal: Negative  Skin: Negative  Neurological: Negative for dizziness, light-headedness and headaches  Hematological: Negative for adenopathy  Psychiatric/Behavioral: Negative  Objective:   Physical Exam  Vitals and nursing note reviewed  Constitutional:       General: She is not in acute distress  Appearance: Normal appearance  She is well-developed  She is not diaphoretic  HENT:      Head: Normocephalic and atraumatic  Nose: Nose normal  No congestion or rhinorrhea  Mouth/Throat:      Mouth: Mucous membranes are moist       Pharynx: Oropharynx is clear  No oropharyngeal exudate  Eyes:      General: No scleral icterus  Pupils: Pupils are equal, round, and reactive to light  Neck:      Trachea: No tracheal deviation  Cardiovascular:      Rate and Rhythm: Normal rate and regular rhythm  Pulses: Normal pulses  Heart sounds: Normal heart sounds  No murmur heard  Pulmonary:      Effort: Pulmonary effort is normal  No respiratory distress  Breath sounds: Normal breath sounds  No stridor  No wheezing or rales  Chest:      Chest wall: No tenderness  Abdominal:      General: Bowel sounds are normal  There is no distension  Palpations: Abdomen is soft  Tenderness: There is no abdominal tenderness  There is no rebound  Musculoskeletal:         General: Normal range of motion  Cervical back: Normal range of motion and neck supple  No muscular tenderness  Lymphadenopathy:      Cervical: No cervical adenopathy  Skin:     General: Skin is warm and dry  Coloration: Skin is not jaundiced or pale  Findings: No erythema or rash  Neurological:      General: No focal deficit present  Mental Status: She is alert and oriented to person, place, and time     Psychiatric:         Mood and Affect: Mood normal          Behavior: Behavior normal          Thought Content: Thought content normal          Judgment: Judgment normal      /80 (BP Location: Left arm, Patient Position: Sitting)   Pulse 92   Temp (!) 96 6 °F (35 9 °C) (Tympanic)   Resp 16   Ht 5' 1" (1 549 m)   Wt 80 7 kg (178 lb)   SpO2 98%   BMI 33 63 kg/m²     No Chest XR results available for this patient  CT chest wo contrast    Result Date: 2/14/2022  Narrative CT CHEST WITHOUT IV CONTRAST INDICATION:   R91 1: Solitary pulmonary nodule  COMPARISON:  Chest CT February 12, 2021  Correlation with chest CT November 3, 2020 and PET CT November 16, 2020  TECHNIQUE: CT examination of the chest was performed without intravenous contrast   Axial, sagittal, and coronal 2D reformatted images were created from the source data and submitted for interpretation  Radiation dose length product (DLP) for this visit:  122 mGy-cm   This examination, like all CT scans performed in the [facility], was performed utilizing techniques to minimize radiation dose exposure, including the use of iterative reconstruction and automated exposure control  FINDINGS: LUNGS:  Smoothly marginated, noncalcified 1 3 x 1 2 cm right lower lobe nodule is unchanged from index study of November 3, 2020 (series 202, image 101)  No intralesional lipid is identified  Mild centrilobular and paraseptal emphysema with no significant change in multiple tiny nodules throughout the lungs, which are again most conspicuous on the coronal maximum intensity projection images  0 4 cm filling defect along the posterior wall of proximal trachea is unchanged from index study of November 3, 2020 (series 205, image 10)  A tiny right paratracheal diverticulum is again noted (series 205, image 16)  PLEURA:  Unremarkable  HEART/GREAT VESSELS: Normal heart size  Mild degree of coronary artery calcifications and atherosclerotic calcifications of the aorta    Aberrant right subclavian artery, a normal anatomic variant  No thoracic aortic aneurysm  MEDIASTINUM AND ERIN:  Unremarkable  CHEST WALL AND LOWER NECK:   Unremarkable  VISUALIZED STRUCTURES IN THE UPPER ABDOMEN:  1 6 cm calculus in the right renal pelvis  Cholecystectomy clips OSSEOUS STRUCTURES:  Spinal degenerative changes are noted  No acute fracture or destructive osseous lesion  Impression 1  The 1 3 x 1 2 cm smoothly marginated right lower lobe nodule is unchanged from index study of November 3, 2020  Continued surveillance is advised  2   Subcentimeter nodularity along the posterior wall of proximal trachea is unchanged from index study of November 3, 2020  Workstation performed: [id number]     No CT Chest,Abdomen,Pelvis results available for this patient  No NM PET CT results available for this patient  No Barium Swallow results available for this patient

## 2022-02-16 ENCOUNTER — TELEPHONE (OUTPATIENT)
Dept: INTERNAL MEDICINE CLINIC | Facility: CLINIC | Age: 67
End: 2022-02-16

## 2022-02-16 DIAGNOSIS — I10 PRIMARY HYPERTENSION: ICD-10-CM

## 2022-02-16 NOTE — TELEPHONE ENCOUNTER
As we renew patient script they will [facility] I ask patient to call back as to what state or phone number so we call pull the correct one into her account

## 2022-02-17 ENCOUNTER — TELEPHONE (OUTPATIENT)
Dept: HEMATOLOGY ONCOLOGY | Facility: CLINIC | Age: 67
End: 2022-02-17

## 2022-02-17 NOTE — TELEPHONE ENCOUNTER
Maribel from [employer], calling to request medication request be transferred to fill  She can be reached at [phone]

## 2022-02-17 NOTE — TELEPHONE ENCOUNTER
Spoke with Connie Hilario at [facility]  We have not prescribed any medication for this patient  Advised her to reach out to PCP

## 2022-02-22 ENCOUNTER — HOSPITAL ENCOUNTER (OUTPATIENT)
Dept: BONE DENSITY | Facility: CLINIC | Age: 67
Discharge: HOME | End: 2022-02-22
Payer: COMMERCIAL

## 2022-02-22 ENCOUNTER — HOSPITAL ENCOUNTER (OUTPATIENT)
Dept: MAMMOGRAPHY | Facility: CLINIC | Age: 67
Discharge: HOME | End: 2022-02-22
Payer: COMMERCIAL

## 2022-02-22 ENCOUNTER — TELEPHONE (OUTPATIENT)
Dept: INTERNAL MEDICINE CLINIC | Facility: CLINIC | Age: 67
End: 2022-02-22

## 2022-02-22 DIAGNOSIS — Z13.820 OSTEOPOROSIS SCREENING: ICD-10-CM

## 2022-02-22 DIAGNOSIS — M19.90 ARTHRITIS: ICD-10-CM

## 2022-02-22 DIAGNOSIS — Z12.31 VISIT FOR SCREENING MAMMOGRAM: ICD-10-CM

## 2022-02-22 PROCEDURE — 77067 SCR MAMMO BI INCL CAD: CPT

## 2022-02-22 PROCEDURE — 77063 BREAST TOMOSYNTHESIS BI: CPT

## 2022-02-22 PROCEDURE — 77080 DXA BONE DENSITY AXIAL: CPT

## 2022-02-22 NOTE — TELEPHONE ENCOUNTER
----- Message from Shawn Rutherford, [address] sent at 2/22/2022  4:35 PM EST -----   Let the patient know that her DEXA scan shows normal bone density

## 2022-02-28 ENCOUNTER — TELEPHONE (OUTPATIENT)
Dept: INTERNAL MEDICINE CLINIC | Facility: CLINIC | Age: 67
End: 2022-02-28

## 2022-02-28 NOTE — TELEPHONE ENCOUNTER
----- Message from Rosio Bridges, [address] sent at 2/28/2022  8:51 AM EST -----   Let the patient know that her mammogram is normal

## 2022-03-03 ENCOUNTER — OFFICE VISIT (OUTPATIENT)
Dept: AUDIOLOGY | Age: 67
End: 2022-03-03
Payer: COMMERCIAL

## 2022-03-03 DIAGNOSIS — H90.3 SENSORY HEARING LOSS, BILATERAL: Primary | ICD-10-CM

## 2022-03-03 DIAGNOSIS — H91.93 BILATERAL HEARING LOSS, UNSPECIFIED HEARING LOSS TYPE: ICD-10-CM

## 2022-03-03 PROCEDURE — 92557 COMPREHENSIVE HEARING TEST: CPT | Performed by: AUDIOLOGIST

## 2022-03-03 PROCEDURE — 92567 TYMPANOMETRY: CPT | Performed by: AUDIOLOGIST

## 2022-03-03 NOTE — PROGRESS NOTES
HEARING EVALUATION    Name:  Madeline Darling  :  1955  Age:  77 y o  Date of Evaluation: 22     History: Difficulty Understanding  Reason for visit: Madeline Darling is being seen today at the request of Dr Rollo Sever for an evaluation of hearing  Patient reports difficulty hearing and understanding speech  Patient noted a non-bothersome tinnitus bilaterally  Patient denies otalgia, otorrhea, dizziness, and fullness  Patient denies a family history of hearing loss and noise exposure  EVALUATION:    Otoscopic Evaluation:   Right Ear: Clear and healthy ear canal and tympanic membrane   Left Ear: Clear and healthy ear canal and tympanic membrane    Tympanometry:   Right: Type A - normal middle ear pressure and compliance   Left: Type A - normal middle ear pressure and compliance    Audiogram Results:  Pure tone testing revealed a mild sloping to modeartely severe sensorineural hearing loss bilaterally  SRT and PTA are in agreement indicating good test reliability  Word recognition scores were good bilaterally  *see attached audiogram      RECOMMENDATIONS:  Annual hearing eval, Return to [facility]  for F/U, Hearing Aid Evaluation and Copy to Patient/Caregiver    PATIENT EDUCATION:   Discussed results and recommendations with patient  Questions were addressed and the patient was encouraged to contact our department should concerns arise        Sindy Rene , CCC-A  Clinical Audiologist

## 2022-03-06 DIAGNOSIS — I10 ESSENTIAL HYPERTENSION: ICD-10-CM

## 2022-03-07 RX ORDER — LOSARTAN POTASSIUM 50 MG/1
TABLET ORAL
Qty: 90 TABLET | Refills: 0 | Status: SHIPPED | OUTPATIENT
Start: 2022-03-07 | End: 2022-04-15 | Stop reason: SDUPTHER

## 2022-03-14 ENCOUNTER — TELEPHONE (OUTPATIENT)
Dept: INTERNAL MEDICINE CLINIC | Facility: CLINIC | Age: 67
End: 2022-03-14

## 2022-03-22 ENCOUNTER — OFFICE VISIT (OUTPATIENT)
Dept: OBGYN CLINIC | Facility: CLINIC | Age: 67
End: 2022-03-22
Payer: COMMERCIAL

## 2022-03-22 VITALS
HEIGHT: 61 IN | WEIGHT: 178.6 LBS | DIASTOLIC BLOOD PRESSURE: 80 MMHG | SYSTOLIC BLOOD PRESSURE: 150 MMHG | BODY MASS INDEX: 33.72 KG/M2

## 2022-03-22 DIAGNOSIS — B37.2 SKIN YEAST INFECTION: Primary | ICD-10-CM

## 2022-03-22 DIAGNOSIS — N90.7 INCLUSION CYST OF VULVA: ICD-10-CM

## 2022-03-22 PROCEDURE — 3079F DIAST BP 80-89 MM HG: CPT | Performed by: STUDENT IN AN ORGANIZED HEALTH CARE EDUCATION/TRAINING PROGRAM

## 2022-03-22 PROCEDURE — 99202 OFFICE O/P NEW SF 15 MIN: CPT | Performed by: STUDENT IN AN ORGANIZED HEALTH CARE EDUCATION/TRAINING PROGRAM

## 2022-03-22 PROCEDURE — 3077F SYST BP >= 140 MM HG: CPT | Performed by: STUDENT IN AN ORGANIZED HEALTH CARE EDUCATION/TRAINING PROGRAM

## 2022-03-22 PROCEDURE — 3008F BODY MASS INDEX DOCD: CPT | Performed by: STUDENT IN AN ORGANIZED HEALTH CARE EDUCATION/TRAINING PROGRAM

## 2022-03-22 PROCEDURE — 4004F PT TOBACCO SCREEN RCVD TLK: CPT | Performed by: STUDENT IN AN ORGANIZED HEALTH CARE EDUCATION/TRAINING PROGRAM

## 2022-03-22 PROCEDURE — 1160F RVW MEDS BY RX/DR IN RCRD: CPT | Performed by: STUDENT IN AN ORGANIZED HEALTH CARE EDUCATION/TRAINING PROGRAM

## 2022-03-22 RX ORDER — NYSTATIN 100000 [USP'U]/G
POWDER TOPICAL 2 TIMES DAILY PRN
Qty: 15 G | Refills: 0 | Status: SHIPPED | OUTPATIENT
Start: 2022-03-22 | End: 2022-06-09 | Stop reason: SDUPTHER

## 2022-03-22 NOTE — ASSESSMENT & PLAN NOTE
Diffuse on both labia  Left side with recently drained raw area  Advised to not pick or drain  Witch hazel prn for comfort along with warm soaks  If pain, itching or area of new concern return to office

## 2022-03-22 NOTE — PROGRESS NOTES
Assessment/Plan:   Inclusion cyst of vulva  Diffuse on both labia  Left side with recently drained raw area  Advised to not pick or drain  Witch hazel prn for comfort along with warm soaks  If pain, itching or area of new concern return to office    Skin yeast infection  Under abdominal fold  Nystatin prn       Diagnoses and all orders for this visit:    Skin yeast infection  -     nystatin (MYCOSTATIN) powder; Apply topically 2 (two) times a day as needed (itching)    Inclusion cyst of vulva          Subjective:     Patient ID: Ravi Quiles is a 77 y o  female  78 yo here for problem visit  She reports vulvar cysts that get itchy at times  She picks and drains them of clear/yellow thick substance and following they are sore  She also reports itching under her pannus  She reports cervical cancer 30 years ago treated with a hysterectomy (no chemo or radiation)  She reports her PCP does her pelvic and breast exams  She is not sexually active  Review of Systems   Constitutional: Negative for chills and fever  HENT: Negative for ear pain and sore throat  Eyes: Negative for pain and visual disturbance  Respiratory: Negative for cough and shortness of breath  Cardiovascular: Negative for chest pain and palpitations  Gastrointestinal: Negative for abdominal pain, constipation, diarrhea, nausea and vomiting  Genitourinary: Positive for vaginal pain (vulvar at site of drainage)  Negative for dysuria, frequency, hematuria, urgency, vaginal bleeding and vaginal discharge  Musculoskeletal: Negative for arthralgias and back pain  Skin: Negative for color change and rash  Neurological: Negative for seizures and syncope  All other systems reviewed and are negative  Objective:     Physical Exam  Vitals reviewed  Constitutional:       General: She is not in acute distress  Appearance: She is well-developed  She is not diaphoretic  HENT:      Head: Normocephalic and atraumatic  Pulmonary:      Effort: Pulmonary effort is normal  No respiratory distress  Genitourinary:     Vagina: No vaginal discharge, erythema, tenderness or bleeding  Uterus: Absent  Comments: Cervix absent  Bimanual limited but without masses  Musculoskeletal:      Cervical back: Normal range of motion  Neurological:      Mental Status: She is alert and oriented to person, place, and time  Psychiatric:         Behavior: Behavior normal          Thought Content:  Thought content normal          Judgment: Judgment normal

## 2022-03-30 ENCOUNTER — OFFICE VISIT (OUTPATIENT)
Dept: AUDIOLOGY | Age: 67
End: 2022-03-30

## 2022-03-30 DIAGNOSIS — H90.3 SENSORY HEARING LOSS, BILATERAL: Primary | ICD-10-CM

## 2022-03-30 NOTE — PROGRESS NOTES
Hearing Aid Evaluation  Name:  Tk Peng  :  1955  Age:  77 y o  Date of Evaluation: 22     Audiologic Results: Audiologic testing was performed on 3/3/22, testing revealed mild to moderate sensorineural hearing loss  Amplification is recommended binaurally    Hearing Aid Evaluation:  Hearing aid styles, technology, and price were discussed with the patient  Possible hearing aid options, programs, and accessories were discussed  Pricing options and technology levels were discussed to determine the appropriate device to recommend  Recommendation/Quote: The first hearing aid recommendation is  Oticon MORE 3 Mini [facility]  The second hearing aid recommendation is  Oticon Zircon Mini [facility]  See attached quote sheet*    Selection:   The patient has a hearing aid benefit through [email or web address]  Patient wants to look into hearing aid benefit  Patient will call back if she decides to purchase through our clinic           Sindy Harrington , CCC-A  Clinical Audiologist

## 2022-04-15 ENCOUNTER — OFFICE VISIT (OUTPATIENT)
Dept: INTERNAL MEDICINE CLINIC | Facility: CLINIC | Age: 67
End: 2022-04-15
Payer: COMMERCIAL

## 2022-04-15 VITALS
HEIGHT: 61 IN | HEART RATE: 84 BPM | OXYGEN SATURATION: 96 % | SYSTOLIC BLOOD PRESSURE: 172 MMHG | BODY MASS INDEX: 34.25 KG/M2 | DIASTOLIC BLOOD PRESSURE: 78 MMHG | WEIGHT: 181.4 LBS | TEMPERATURE: 97.8 F

## 2022-04-15 DIAGNOSIS — E78.2 MIXED HYPERLIPIDEMIA: ICD-10-CM

## 2022-04-15 DIAGNOSIS — Z12.11 COLON CANCER SCREENING: ICD-10-CM

## 2022-04-15 DIAGNOSIS — G56.03 BILATERAL CARPAL TUNNEL SYNDROME: Primary | ICD-10-CM

## 2022-04-15 DIAGNOSIS — N18.2 STAGE 2 CHRONIC KIDNEY DISEASE: ICD-10-CM

## 2022-04-15 DIAGNOSIS — M17.0 PRIMARY OSTEOARTHRITIS OF BOTH KNEES: ICD-10-CM

## 2022-04-15 DIAGNOSIS — R12 HEARTBURN: ICD-10-CM

## 2022-04-15 DIAGNOSIS — I10 PRIMARY HYPERTENSION: ICD-10-CM

## 2022-04-15 DIAGNOSIS — R73.03 PREDIABETES: ICD-10-CM

## 2022-04-15 DIAGNOSIS — I10 ESSENTIAL HYPERTENSION: ICD-10-CM

## 2022-04-15 PROCEDURE — G0439 PPPS, SUBSEQ VISIT: HCPCS | Performed by: INTERNAL MEDICINE

## 2022-04-15 PROCEDURE — 4004F PT TOBACCO SCREEN RCVD TLK: CPT | Performed by: INTERNAL MEDICINE

## 2022-04-15 PROCEDURE — 1125F AMNT PAIN NOTED PAIN PRSNT: CPT | Performed by: INTERNAL MEDICINE

## 2022-04-15 PROCEDURE — 1090F PRES/ABSN URINE INCON ASSESS: CPT | Performed by: INTERNAL MEDICINE

## 2022-04-15 PROCEDURE — 3077F SYST BP >= 140 MM HG: CPT | Performed by: INTERNAL MEDICINE

## 2022-04-15 PROCEDURE — 3008F BODY MASS INDEX DOCD: CPT | Performed by: INTERNAL MEDICINE

## 2022-04-15 PROCEDURE — 99214 OFFICE O/P EST MOD 30 MIN: CPT | Performed by: INTERNAL MEDICINE

## 2022-04-15 PROCEDURE — 1160F RVW MEDS BY RX/DR IN RCRD: CPT | Performed by: INTERNAL MEDICINE

## 2022-04-15 PROCEDURE — 3288F FALL RISK ASSESSMENT DOCD: CPT | Performed by: INTERNAL MEDICINE

## 2022-04-15 PROCEDURE — 1170F FXNL STATUS ASSESSED: CPT | Performed by: INTERNAL MEDICINE

## 2022-04-15 PROCEDURE — 3078F DIAST BP <80 MM HG: CPT | Performed by: INTERNAL MEDICINE

## 2022-04-15 RX ORDER — FUROSEMIDE 20 MG/1
20 TABLET ORAL DAILY
Qty: 90 TABLET | Refills: 3 | Status: SHIPPED | OUTPATIENT
Start: 2022-04-15

## 2022-04-15 RX ORDER — ATORVASTATIN CALCIUM 10 MG/1
10 TABLET, FILM COATED ORAL DAILY
Qty: 90 TABLET | Refills: 3 | Status: SHIPPED | OUTPATIENT
Start: 2022-04-15

## 2022-04-15 RX ORDER — POTASSIUM CHLORIDE 1500 MG/1
20 TABLET, FILM COATED, EXTENDED RELEASE ORAL DAILY
Qty: 90 TABLET | Refills: 3 | Status: SHIPPED | OUTPATIENT
Start: 2022-04-15

## 2022-04-15 RX ORDER — VALSARTAN 320 MG/1
320 TABLET ORAL DAILY
Qty: 90 TABLET | Refills: 3 | Status: SHIPPED | OUTPATIENT
Start: 2022-04-15

## 2022-04-15 RX ORDER — MELOXICAM 15 MG/1
15 TABLET ORAL
Qty: 30 TABLET | Refills: 3 | Status: SHIPPED | OUTPATIENT
Start: 2022-04-15

## 2022-04-15 RX ORDER — OMEPRAZOLE 20 MG/1
20 CAPSULE, DELAYED RELEASE ORAL DAILY
Qty: 90 CAPSULE | Refills: 3 | Status: SHIPPED | OUTPATIENT
Start: 2022-04-15

## 2022-04-15 RX ORDER — FENOFIBRATE 160 MG/1
160 TABLET ORAL DAILY
Qty: 90 TABLET | Refills: 3 | Status: SHIPPED | OUTPATIENT
Start: 2022-04-15 | End: 2023-04-15

## 2022-04-15 RX ORDER — LOSARTAN POTASSIUM 50 MG/1
50 TABLET ORAL DAILY
Qty: 90 TABLET | Refills: 3 | Status: SHIPPED | OUTPATIENT
Start: 2022-04-15 | End: 2022-04-15

## 2022-04-15 RX ORDER — METOPROLOL SUCCINATE 25 MG/1
25 TABLET, EXTENDED RELEASE ORAL DAILY
Qty: 90 TABLET | Refills: 3 | Status: SHIPPED | OUTPATIENT
Start: 2022-04-15

## 2022-04-15 NOTE — PATIENT INSTRUCTIONS
Problems addressed today:   Wrist pain highly suggestive of carpal tunnel:  Meloxicam 15 mg daily try not to exceed 4 times a week and referral to Neurology   Osteoarthritis of both knees:  Meloxicam 15 mg daily try not to exceed 4 times a week and referral to [facility]  Uncontrolled blood pressure: Discontinue losartan  Begin valsartan 320 mg daily plus metoprolol extended release 25 mg daily     Revisit with me for a blood pressure check in about 2 weeks

## 2022-04-15 NOTE — PROGRESS NOTES
Assessment/Plan:       Diagnoses and all orders for this visit:    Bilateral carpal tunnel syndrome  -     meloxicam (Mobic) 15 mg tablet; Take 1 tablet (15 mg total) by mouth every other day  -     Ambulatory Referral to Neurology; Future    Primary hypertension  -     Potassium Chloride ER 20 MEQ TBCR; Take 1 tablet (20 mEq total) by mouth in the morning  -     valsartan (DIOVAN) 320 MG tablet; Take 1 tablet (320 mg total) by mouth daily  -     metoprolol succinate (TOPROL-XL) 25 mg 24 hr tablet; Take 1 tablet (25 mg total) by mouth daily    Mixed hyperlipidemia  -     fenofibrate 160 MG tablet; Take 1 tablet (160 mg total) by mouth daily  -     atorvastatin (LIPITOR) 10 mg tablet; Take 1 tablet (10 mg total) by mouth daily    Heartburn  -     omeprazole (PriLOSEC) 20 mg delayed release capsule; Take 1 capsule (20 mg total) by mouth daily    Essential hypertension  -     furosemide (LASIX) 20 mg tablet; Take 1 tablet (20 mg total) by mouth daily  -     Discontinue: losartan (COZAAR) 50 mg tablet; Take 1 tablet (50 mg total) by mouth daily    Colon cancer screening  -     Ambulatory referral for Colonoscopy; Future    Stage 2 chronic kidney disease    Prediabetes    Primary osteoarthritis of both knees  -     Ambulatory Referral to Orthopedic Surgery; Future  -     meloxicam (Mobic) 15 mg tablet; Take 1 tablet (15 mg total) by mouth every other day                Subjective:      Patient ID: Masha Umana is a 77 y o  female  57-year-old lady  Several complaints verbalized today  Ongoing knee pain exacerbated by activity and weight-bearing relieved by rest   As far back as 3-4 years ago seen by [facility] and diagnosed with meniscal tear  At this point I am certain she has severe osteoarthritis in addition  Taking Tylenol total dose about 1800 mg daily with minimal relief        Carpal tunnel symptoms:  The patient has "electric shock" pain in both hands right greater than left which is been ongoing for several months  A chronic diarrhea followed by GI  This is better but not great  Blood pressure today is totally uncontrolled as high as 170 currently taking losartan 50 this will have to be adjusted     She 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 couple and is, in affect, their caretaker  She is hoping to move out and moving with her son in [place]  This is the best step for her I believe  The elderly couple belong in a nursing home  Recent laboratory testing done:  Fortunately estimated GFR is quite good at 87 so we have some room to play with nonsteroidal anti-inflammatory drugs  The following portions of the patient's history were reviewed and updated as appropriate:   She has a past medical history of Depression, Hyperlipidemia, Hypertension, and Nodule of lower lobe of right lung ,  does not have any pertinent problems on file  ,   has a past surgical history that includes Cholecystectomy; Hysterectomy; Colonoscopy; and Hemicoloectomy w/ anastomosis (N/A, 12/14/2020)  ,  family history includes Heart disease in her father; Throat cancer in her mother  ,   reports that she has been smoking cigarettes  She has a 13 00 pack-year smoking history  She has never used smokeless tobacco  She reports current alcohol use of about 1 0 standard drink of alcohol per week  She reports that she does not use drugs  ,  is allergic to ciprofloxacin and codeine     Current Outpatient Medications   Medication Sig Dispense Refill    ASPIRIN 81 PO Take 81 mg by mouth daily       atorvastatin (LIPITOR) 10 mg tablet Take 1 tablet (10 mg total) by mouth daily 90 tablet 3    colestipol (COLESTID) 1 g tablet Take 2 tablets (2 g total) by mouth 3 (three) times a day 180 tablet 0    ergocalciferol (VITAMIN D2) 50,000 units Take 1 capsule (50,000 Units total) by mouth once a week for 12 doses 12 capsule 0    fenofibrate 160 MG tablet Take 1 tablet (160 mg total) by mouth daily 90 tablet 3    furosemide (LASIX) 20 mg tablet Take 1 tablet (20 mg total) by mouth daily 90 tablet 3    glucose blood (OneTouch Verio) test strip Pt to test tid dx;73 03 pt  Requesting verio reflex test strips  300 strip 3    glucose blood (OneTouch Verio) test strip Pt to test blood sugar  Daily 100 strip 3    ketoconazole (NIZORAL) 2 % cream Apply topically daily 15 g 0    ketoconazole (NIZORAL) 2 % cream Apply topically daily 30 g 5    loperamide (IMODIUM) 2 mg capsule Take 1 capsule (2 mg total) by mouth 2 (two) times a day with meals 60 capsule 1    Loratadine 10 MG CAPS as needed       Misc Natural Products (OSTEO BI-FLEX ADV DOUBLE ST PO) Take by mouth      nystatin (MYCOSTATIN) powder Apply topically 2 (two) times a day as needed (itching) 15 g 0    omeprazole (PriLOSEC) 20 mg delayed release capsule Take 1 capsule (20 mg total) by mouth daily 90 capsule 3    OneTouch Delica Lancets 43X MISC Check blood glucose daily 100 each 3    Potassium Chloride ER 20 MEQ TBCR Take 1 tablet (20 mEq total) by mouth in the morning 90 tablet 3    meloxicam (Mobic) 15 mg tablet Take 1 tablet (15 mg total) by mouth every other day 30 tablet 3    metoprolol succinate (TOPROL-XL) 25 mg 24 hr tablet Take 1 tablet (25 mg total) by mouth daily 90 tablet 3    valsartan (DIOVAN) 320 MG tablet Take 1 tablet (320 mg total) by mouth daily 90 tablet 3     No current facility-administered medications for this visit  Review of Systems   Musculoskeletal: Positive for arthralgias and back pain  Psychiatric/Behavioral: Positive for dysphoric mood  The patient is nervous/anxious  All other systems reviewed and are negative  Objective:  Vitals:    04/15/22 0929   BP: (!) 172/78   Pulse: 84   Temp: 97 8 °F (36 6 °C)   SpO2: 96%      Physical Exam  Constitutional:       Comments: Overweight female patient who appears to be the stated age   Cardiovascular:      Rate and Rhythm: Normal rate and regular rhythm  Pulses: Normal pulses        Heart sounds: Normal heart sounds  Pulmonary:      Effort: Pulmonary effort is normal       Breath sounds: Normal breath sounds  Musculoskeletal:         General: Deformity present  No swelling or tenderness  Comments: Minimal osteoarthrosis deformities of the small joints   Neurological:      General: No focal deficit present  Mental Status: She is alert  Mental status is at baseline  Patient Instructions     Problems addressed today:   Wrist pain highly suggestive of carpal tunnel:  Meloxicam 15 mg daily try not to exceed 4 times a week and referral to Neurology   Osteoarthritis of both knees:  Meloxicam 15 mg daily try not to exceed 4 times a week and referral to [facility]  Uncontrolled blood pressure: Discontinue losartan  Begin valsartan 320 mg daily plus metoprolol extended release 25 mg daily     Revisit with me for a blood pressure check in about 2 weeks

## 2022-04-15 NOTE — PROGRESS NOTES
Assessment and Plan:     Problem List Items Addressed This Visit        Cardiovascular and Mediastinum    Hypertension       Other    Hyperlipidemia      Other Visit Diagnoses     Heartburn        Essential hypertension               Preventive health issues were discussed with patient, and age appropriate screening tests were ordered as noted in patient's After Visit Summary  Personalized health advice and appropriate referrals for health education or preventive services given if needed, as noted in patient's After Visit Summary  History of Present Illness:     Patient presents for Medicare Annual Wellness visit    Patient Care Team:  So Vinson MD as PCP - General (Internal Medicine)  Jeremy Samson MD as Surgeon (Thoracic Surgery)  Chetna Ahn MD (Gastroenterology)     Problem List:     Patient Active Problem List   Diagnosis    Prediabetes    Depression    Hyperlipidemia    Hypertension    Hypokalemia    Arthritis    BMI 32 0-32 9,adult    Smoking addiction    Adenoma of cecum    Nodule of lower lobe of right lung    Stage 2 chronic kidney disease    Tinea    Fatigue    Inclusion cyst of vulva    Skin yeast infection      Past Medical and Surgical History:     Past Medical History:   Diagnosis Date    Depression     Hyperlipidemia     Hypertension     Nodule of lower lobe of right lung      Past Surgical History:   Procedure Laterality Date    CHOLECYSTECTOMY      COLONOSCOPY      HEMICOLOECTOMY W/ ANASTOMOSIS N/A 12/14/2020    Procedure: LAPAROSCOPIC HAND-ASSISTED RIGHT HEMICOLECTOMY;  Surgeon: oLi Peterson MD;  Location: [facility];  Service: Surgical Oncology    HYSTERECTOMY      partial      Family History:     Family History   Problem Relation Age of Onset    Throat cancer Mother     Heart disease Father       Social History:     Social History     Socioeconomic History    Marital status:       Spouse name: None    Number of children: None    Years of education: None    Highest education level: None   Occupational History    None   Tobacco Use    Smoking status: Current Every Day Smoker     Packs/day: 0 25     Years: 52 00     Pack years: 13 00     Types: Cigarettes    Smokeless tobacco: Never Used   Vaping Use    Vaping Use: Never used   Substance and Sexual Activity    Alcohol use: Yes     Alcohol/week: 1 0 standard drink     Types: 1 Glasses of wine per week     Comment: on the weekend     Drug use: Never    Sexual activity: Not Currently   Other Topics Concern    None   Social History Narrative    None     Social Determinants of Health     Financial Resource Strain: Not on file   Food Insecurity: Not on file   Transportation Needs: Not on file   Physical Activity: Insufficiently Active    Days of Exercise per Week: 3 days    Minutes of Exercise per Session: 30 min   Stress: No Stress Concern Present    Feeling of Stress : Not at all   Social Connections: Not on file   Intimate Partner Violence: Not on file   Housing Stability: Not on file      Medications and Allergies:     Current Outpatient Medications   Medication Sig Dispense Refill    ASPIRIN 81 PO Take 81 mg by mouth daily       atorvastatin (LIPITOR) 10 mg tablet Take 1 tablet (10 mg total) by mouth daily 90 tablet 2    colestipol (COLESTID) 1 g tablet Take 2 tablets (2 g total) by mouth 3 (three) times a day 180 tablet 0    ergocalciferol (VITAMIN D2) 50,000 units Take 1 capsule (50,000 Units total) by mouth once a week for 12 doses 12 capsule 0    fenofibrate 160 MG tablet Take 1 tablet (160 mg total) by mouth daily 90 tablet 3    furosemide (LASIX) 20 mg tablet Take 1 tablet (20 mg total) by mouth daily 90 tablet 2    glucose blood (OneTouch Verio) test strip Pt to test tid dx;73 03 pt  Requesting verio reflex test strips   300 strip 3    glucose blood (OneTouch Verio) test strip Pt to test blood sugar  Daily 100 strip 3    ketoconazole (NIZORAL) 2 % cream Apply topically daily 15 g 0    ketoconazole (NIZORAL) 2 % cream Apply topically daily 30 g 5    loperamide (IMODIUM) 2 mg capsule Take 1 capsule (2 mg total) by mouth 2 (two) times a day with meals 60 capsule 1    Loratadine 10 MG CAPS as needed       losartan (COZAAR) 50 mg tablet TAKE ONE TABLET BY MOUTH EVERY DAY 90 tablet 0    Misc Natural Products (OSTEO BI-FLEX ADV DOUBLE ST PO) Take by mouth      naproxen (NAPROSYN) 500 mg tablet Take 1 tablet (500 mg total) by mouth 2 (two) times a day with meals 60 tablet 2    nystatin (MYCOSTATIN) powder Apply topically 2 (two) times a day as needed (itching) 15 g 0    omeprazole (PriLOSEC) 20 mg delayed release capsule Take 1 capsule (20 mg total) by mouth daily 90 capsule 0    OneTouch Delica Lancets 20X MISC Check blood glucose daily 100 each 3    Potassium Chloride ER 20 MEQ TBCR Take 1 tablet (20 mEq total) by mouth in the morning 90 tablet 1    busPIRone (BUSPAR) 5 mg tablet Take 2 tablets (10 mg total) by mouth 3 (three) times a day (Patient not taking: Reported on 4/15/2022 ) 90 tablet 3     No current facility-administered medications for this visit  Allergies   Allergen Reactions    Ciprofloxacin Rash    Codeine Rash      Immunizations:     Immunization History   Administered Date(s) Administered    COVID-19 MODERNA VACC 0 5 ML IM 11/19/2021, 12/17/2021    INFLUENZA 10/16/2019    Influenza, high dose seasonal 0 7 mL 10/09/2020      Health Maintenance:         Topic Date Due    Colorectal Cancer Screening  10/21/2021    Hepatitis C Screening  12/05/2023 (Originally 1955)    Breast Cancer Screening: Mammogram  02/22/2023     There are no preventive care reminders to display for this patient     Medicare Health Risk Assessment:     BP (!) 172/78 (BP Location: Left arm, Patient Position: Sitting, Cuff Size: Standard) Comment: bp  Pulse 84   Temp 97 8 °F (36 6 °C) (Temporal) Comment: no  Ht 5' 1" (1 549 m)   Wt 82 3 kg (181 lb 6 4 oz)   SpO2 96% BMI 34 28 kg/m²      Chetna is here for her Subsequent Wellness visit  Health Risk Assessment:   Patient rates overall health as good  Patient feels that their physical health rating is same  Patient is dissatisfied with their life  Eyesight was rated as same  Hearing was rated as same  Patient feels that their emotional and mental health rating is same  Patients states they are often angry  Patient states they are always unusually tired/fatigued  Pain experienced in the last 7 days has been a lot  Patient's pain rating has been 10/10  Patient states that she has experienced no weight loss or gain in last 6 months  Fall Risk Screening: In the past year, patient has experienced: no history of falling in past year      Urinary Incontinence Screening:   Patient has not leaked urine accidently in the last six months  Home Safety:  Patient has trouble with stairs inside or outside of their home  Patient has working smoke alarms and has working carbon monoxide detector  Home safety hazards include: none  Nutrition:   Current diet is Regular  Medications:   Patient is currently taking over-the-counter supplements  OTC medications include: see medication list  Patient is able to manage medications  Activities of Daily Living (ADLs)/Instrumental Activities of Daily Living (IADLs):   Walk and transfer into and out of bed and chair?: Yes  Dress and groom yourself?: Yes    Bathe or shower yourself?: Yes    Feed yourself?  Yes  Do your laundry/housekeeping?: Yes  Manage your money, pay your bills and track your expenses?: Yes  Make your own meals?: Yes    Do your own shopping?: Yes    Previous Hospitalizations:   Any hospitalizations or ED visits within the last 12 months?: No      Advance Care Planning:   Living will: No    Durable POA for healthcare: No    Advanced directive: No    Advanced directive counseling given: Yes    End of Life Decisions reviewed with patient: Yes      Cognitive Screening: Provider or family/friend/caregiver concerned regarding cognition?: No    PREVENTIVE SCREENINGS      Cardiovascular Screening:    General: Screening Not Indicated and History Lipid Disorder      Diabetes Screening:     General: Screening Current      Colorectal Cancer Screening:     General: Screening Current      Breast Cancer Screening:     General: Screening Current      Cervical Cancer Screening:    General: Screening Not Indicated      Lung Cancer Screening:     General: Screening Not Indicated      Hepatitis C Screening:    General: Screening Current    Screening, Brief Intervention, and Referral to Treatment (SBIRT)    Screening  Typical number of drinks in a day: 1  Typical number of drinks in a week: 1  Interpretation: Low risk drinking behavior  AUDIT-C Screenin) How often did you have a drink containing alcohol in the past year? 2 to 4 times a month  2) How many drinks did you have on a typical day when you were drinking in the past year?  1 to 2  3) How often did you have 6 or more drinks on one occasion in the past year? never    AUDIT-C Score: 2  Interpretation: Score 0-2 (female): Negative screen for alcohol misuse    Single Item Drug Screening:  How often have you used an illegal drug (including marijuana) or a prescription medication for non-medical reasons in the past year? never    Single Item Drug Screen Score: 0  Interpretation: Negative screen for possible drug use disorder      Ivett Dillard MD

## 2022-05-31 ENCOUNTER — APPOINTMENT (OUTPATIENT)
Dept: LAB | Facility: CLINIC | Age: 67
End: 2022-05-31
Payer: COMMERCIAL

## 2022-05-31 DIAGNOSIS — M79.641 RIGHT HAND PAIN: ICD-10-CM

## 2022-05-31 LAB
CRP SERPL QL: 7.9 MG/L
ERYTHROCYTE [DISTWIDTH] IN BLOOD BY AUTOMATED COUNT: 12.8 % (ref 11.6–15.1)
EST. AVERAGE GLUCOSE BLD GHB EST-MCNC: 140 MG/DL
HBA1C MFR BLD: 6.5 %
HCT VFR BLD AUTO: 38.5 % (ref 34.8–46.1)
HGB BLD-MCNC: 12.4 G/DL (ref 11.5–15.4)
MCH RBC QN AUTO: 29.4 PG (ref 26.8–34.3)
MCHC RBC AUTO-ENTMCNC: 32.2 G/DL (ref 31.4–37.4)
MCV RBC AUTO: 91 FL (ref 82–98)
PLATELET # BLD AUTO: 402 THOUSANDS/UL (ref 149–390)
PMV BLD AUTO: 10 FL (ref 8.9–12.7)
RBC # BLD AUTO: 4.22 MILLION/UL (ref 3.81–5.12)
RHEUMATOID FACT SER QL LA: NEGATIVE
TSH SERPL DL<=0.05 MIU/L-ACNC: 1.31 UIU/ML (ref 0.45–4.5)
URATE SERPL-MCNC: 5.5 MG/DL (ref 2–6.8)
WBC # BLD AUTO: 11.79 THOUSAND/UL (ref 4.31–10.16)

## 2022-05-31 PROCEDURE — 86430 RHEUMATOID FACTOR TEST QUAL: CPT

## 2022-05-31 PROCEDURE — 36415 COLL VENOUS BLD VENIPUNCTURE: CPT

## 2022-05-31 PROCEDURE — 84550 ASSAY OF BLOOD/URIC ACID: CPT

## 2022-05-31 PROCEDURE — 86038 ANTINUCLEAR ANTIBODIES: CPT

## 2022-05-31 PROCEDURE — 83036 HEMOGLOBIN GLYCOSYLATED A1C: CPT

## 2022-05-31 PROCEDURE — 85027 COMPLETE CBC AUTOMATED: CPT

## 2022-05-31 PROCEDURE — 86140 C-REACTIVE PROTEIN: CPT

## 2022-05-31 PROCEDURE — 84443 ASSAY THYROID STIM HORMONE: CPT

## 2022-05-31 PROCEDURE — 86618 LYME DISEASE ANTIBODY: CPT

## 2022-06-01 LAB
ANA TITR SER IF: NEGATIVE {TITER}
B BURGDOR IGG+IGM SER-ACNC: 23

## 2022-06-09 ENCOUNTER — OFFICE VISIT (OUTPATIENT)
Dept: INTERNAL MEDICINE CLINIC | Facility: CLINIC | Age: 67
End: 2022-06-09
Payer: COMMERCIAL

## 2022-06-09 ENCOUNTER — APPOINTMENT (OUTPATIENT)
Dept: LAB | Facility: CLINIC | Age: 67
End: 2022-06-09
Payer: COMMERCIAL

## 2022-06-09 ENCOUNTER — TELEPHONE (OUTPATIENT)
Dept: INTERNAL MEDICINE CLINIC | Facility: CLINIC | Age: 67
End: 2022-06-09

## 2022-06-09 VITALS
BODY MASS INDEX: 33.48 KG/M2 | OXYGEN SATURATION: 97 % | RESPIRATION RATE: 18 BRPM | TEMPERATURE: 97 F | DIASTOLIC BLOOD PRESSURE: 78 MMHG | WEIGHT: 177.2 LBS | SYSTOLIC BLOOD PRESSURE: 140 MMHG | HEART RATE: 84 BPM

## 2022-06-09 DIAGNOSIS — B35.9 TINEA: ICD-10-CM

## 2022-06-09 DIAGNOSIS — N18.2 STAGE 2 CHRONIC KIDNEY DISEASE: ICD-10-CM

## 2022-06-09 DIAGNOSIS — B37.2 SKIN YEAST INFECTION: ICD-10-CM

## 2022-06-09 DIAGNOSIS — N18.2 STAGE 2 CHRONIC KIDNEY DISEASE: Primary | ICD-10-CM

## 2022-06-09 DIAGNOSIS — E55.9 VITAMIN D DEFICIENCY: ICD-10-CM

## 2022-06-09 DIAGNOSIS — R73.03 PREDIABETES: ICD-10-CM

## 2022-06-09 DIAGNOSIS — I10 PRIMARY HYPERTENSION: Primary | ICD-10-CM

## 2022-06-09 LAB
25(OH)D3 SERPL-MCNC: 55 NG/ML (ref 30–100)
ANION GAP SERPL CALCULATED.3IONS-SCNC: 9 MMOL/L (ref 4–13)
BUN SERPL-MCNC: 13 MG/DL (ref 5–25)
CALCIUM SERPL-MCNC: 9.4 MG/DL (ref 8.3–10.1)
CHLORIDE SERPL-SCNC: 107 MMOL/L (ref 100–108)
CO2 SERPL-SCNC: 24 MMOL/L (ref 21–32)
CREAT SERPL-MCNC: 0.9 MG/DL (ref 0.6–1.3)
GFR SERPL CREATININE-BSD FRML MDRD: 66 ML/MIN/1.73SQ M
GLUCOSE P FAST SERPL-MCNC: 92 MG/DL (ref 65–99)
POTASSIUM SERPL-SCNC: 4.1 MMOL/L (ref 3.5–5.3)
SODIUM SERPL-SCNC: 140 MMOL/L (ref 136–145)

## 2022-06-09 PROCEDURE — 36415 COLL VENOUS BLD VENIPUNCTURE: CPT

## 2022-06-09 PROCEDURE — 99213 OFFICE O/P EST LOW 20 MIN: CPT | Performed by: INTERNAL MEDICINE

## 2022-06-09 PROCEDURE — 82306 VITAMIN D 25 HYDROXY: CPT

## 2022-06-09 PROCEDURE — 80048 BASIC METABOLIC PNL TOTAL CA: CPT

## 2022-06-09 PROCEDURE — 3066F NEPHROPATHY DOC TX: CPT | Performed by: NURSE PRACTITIONER

## 2022-06-09 RX ORDER — NYSTATIN 100000 [USP'U]/G
POWDER TOPICAL 2 TIMES DAILY PRN
Qty: 15 G | Refills: 0 | Status: SHIPPED | OUTPATIENT
Start: 2022-06-09

## 2022-06-09 RX ORDER — KETOCONAZOLE 20 MG/G
CREAM TOPICAL DAILY
Qty: 30 G | Refills: 5 | Status: SHIPPED | OUTPATIENT
Start: 2022-06-09 | End: 2022-06-20 | Stop reason: ALTCHOICE

## 2022-06-09 NOTE — PROGRESS NOTES
Assessment/Plan:       Diagnoses and all orders for this visit:    Primary hypertension    Stage 2 chronic kidney disease    Prediabetes    Tinea  -     ketoconazole (NIZORAL) 2 % cream; Apply topically daily    Skin yeast infection  -     nystatin (MYCOSTATIN) powder; Apply topically 2 (two) times a day as needed (itching)                Subjective:      Patient ID: Shivam Escalona is a 77 y o  female  A 51-year-old lady  I had seen her at the end of April  Blood pressure was not controlled  Meds were adjusted in today it is acceptable losartan 50 was discontinued and valsartan 320 initiated along with metoprolol  Continues to follow with orthopedic for   Carpal tunnel symptoms:  The patient has "electric shock" pain in both hands right greater than left which is been ongoing for several months  A chronic diarrhea followed by GI  This is better but not great  Blood pressure today is totally uncontrolled as high as 170 currently taking losartan 50 this will have to be adjusted     She 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 couple and is, in affect, their caretaker  She is hoping to move out and moving with her son in [place]  This is the best step for her I believe  The elderly couple belong in a nursing home  Recent laboratory testing done:  Fortunately estimated GFR is quite good at 87 so we have some room to play with nonsteroidal anti-inflammatory drugs  Hemoglobin A1c of 6 5 so she is right on the cusp of diabetes    Uses topical ketoconazole for intertrigo    Finally, as in administrative requirement, apparently the patient needs a document from a with her name, and date of birth  The following portions of the patient's history were reviewed and updated as appropriate:   She has a past medical history of Depression, Hyperlipidemia, Hypertension, and Nodule of lower lobe of right lung ,  does not have any pertinent problems on file  ,   has a past surgical history that includes Cholecystectomy; Hysterectomy; Colonoscopy; and Hemicoloectomy w/ anastomosis (N/A, 12/14/2020)  ,  family history includes Heart disease in her father; Throat cancer in her mother  ,   reports that she has been smoking cigarettes  She has a 13 00 pack-year smoking history  She has never used smokeless tobacco  She reports current alcohol use of about 1 0 standard drink of alcohol per week  She reports that she does not use drugs  ,  is allergic to ciprofloxacin and codeine     Current Outpatient Medications   Medication Sig Dispense Refill    ASPIRIN 81 PO Take 81 mg by mouth daily       atorvastatin (LIPITOR) 10 mg tablet Take 1 tablet (10 mg total) by mouth daily 90 tablet 3    colestipol (COLESTID) 1 g tablet Take 2 tablets (2 g total) by mouth 3 (three) times a day 180 tablet 0    ergocalciferol (VITAMIN D2) 50,000 units Take 1 capsule (50,000 Units total) by mouth once a week for 12 doses 12 capsule 0    fenofibrate 160 MG tablet Take 1 tablet (160 mg total) by mouth daily 90 tablet 3    furosemide (LASIX) 20 mg tablet Take 1 tablet (20 mg total) by mouth daily 90 tablet 3    glucose blood (OneTouch Verio) test strip Pt to test tid dx;73 03 pt  Requesting verio reflex test strips   300 strip 3    glucose blood (OneTouch Verio) test strip Pt to test blood sugar  Daily 100 strip 3    ketoconazole (NIZORAL) 2 % cream Apply topically daily 15 g 0    ketoconazole (NIZORAL) 2 % cream Apply topically daily 30 g 5    loperamide (IMODIUM) 2 mg capsule Take 1 capsule (2 mg total) by mouth 2 (two) times a day with meals 60 capsule 1    Loratadine 10 MG CAPS as needed       meloxicam (Mobic) 15 mg tablet Take 1 tablet (15 mg total) by mouth every other day 30 tablet 3    metoprolol succinate (TOPROL-XL) 25 mg 24 hr tablet Take 1 tablet (25 mg total) by mouth daily 90 tablet 3    Misc Natural Products (OSTEO BI-FLEX ADV DOUBLE ST PO) Take by mouth      nystatin (MYCOSTATIN) powder Apply topically 2 (two) times a day as needed (itching) 15 g 0    omeprazole (PriLOSEC) 20 mg delayed release capsule Take 1 capsule (20 mg total) by mouth daily 90 capsule 3    OneTouch Delica Lancets 55V MISC Check blood glucose daily 100 each 3    Potassium Chloride ER 20 MEQ TBCR Take 1 tablet (20 mEq total) by mouth in the morning 90 tablet 3    valsartan (DIOVAN) 320 MG tablet Take 1 tablet (320 mg total) by mouth daily 90 tablet 3     No current facility-administered medications for this visit  Review of Systems   Musculoskeletal: Positive for arthralgias  Skin: Positive for rash  All other systems reviewed and are negative  Objective:  Vitals:    06/09/22 1040   BP: 140/78   Pulse: 84   Resp: 18   Temp: (!) 97 °F (36 1 °C)   SpO2: 97%      Physical Exam  Constitutional:       Appearance: Normal appearance  Pulmonary:      Effort: Pulmonary effort is normal    Neurological:      General: No focal deficit present  Mental Status: She is alert  Psychiatric:         Mood and Affect: Mood normal            There are no Patient Instructions on file for this visit

## 2022-06-09 NOTE — PATIENT INSTRUCTIONS
Blood pressure is much better with the new regime check BMP today to make sure we have not done anything to the renal function; follow-up with me in early September

## 2022-06-09 NOTE — TELEPHONE ENCOUNTER
----- Message from Cierra Gasca sent at 6/9/2022  4:40 PM EDT -----    Please let the patient know that her vitamin-D level is normal   She can certainly take vitamin D3 1000 units over-the-counter daily to keep her levels up

## 2022-06-16 ENCOUNTER — RA CDI HCC (OUTPATIENT)
Dept: OTHER | Facility: HOSPITAL | Age: 67
End: 2022-06-16

## 2022-06-16 NOTE — PROGRESS NOTES
[facility]  coding opportunities       Chart reviewed, no opportunity found:   [address]        Patients Insurance     Medicare Insurance: [employer]

## 2022-06-20 ENCOUNTER — TELEPHONE (OUTPATIENT)
Dept: INTERNAL MEDICINE CLINIC | Facility: CLINIC | Age: 67
End: 2022-06-20

## 2022-06-20 ENCOUNTER — OFFICE VISIT (OUTPATIENT)
Dept: INTERNAL MEDICINE CLINIC | Facility: CLINIC | Age: 67
End: 2022-06-20
Payer: COMMERCIAL

## 2022-06-20 ENCOUNTER — APPOINTMENT (OUTPATIENT)
Dept: LAB | Facility: CLINIC | Age: 67
End: 2022-06-20
Payer: COMMERCIAL

## 2022-06-20 VITALS
WEIGHT: 177 LBS | RESPIRATION RATE: 17 BRPM | HEART RATE: 79 BPM | DIASTOLIC BLOOD PRESSURE: 74 MMHG | SYSTOLIC BLOOD PRESSURE: 140 MMHG | HEIGHT: 61 IN | BODY MASS INDEX: 33.42 KG/M2 | OXYGEN SATURATION: 95 % | TEMPERATURE: 97.8 F

## 2022-06-20 DIAGNOSIS — J43.2 CENTRILOBULAR EMPHYSEMA (HCC): ICD-10-CM

## 2022-06-20 DIAGNOSIS — G56.03 BILATERAL CARPAL TUNNEL SYNDROME: ICD-10-CM

## 2022-06-20 DIAGNOSIS — N18.2 STAGE 2 CHRONIC KIDNEY DISEASE: ICD-10-CM

## 2022-06-20 DIAGNOSIS — R91.1 NODULE OF LOWER LOBE OF RIGHT LUNG: ICD-10-CM

## 2022-06-20 DIAGNOSIS — D72.829 LEUKOCYTOSIS, UNSPECIFIED TYPE: ICD-10-CM

## 2022-06-20 DIAGNOSIS — Z12.11 COLON CANCER SCREENING: ICD-10-CM

## 2022-06-20 DIAGNOSIS — E78.2 MIXED HYPERLIPIDEMIA: ICD-10-CM

## 2022-06-20 DIAGNOSIS — Z01.89 ENCOUNTER FOR OTHER SPECIFIED SPECIAL EXAMINATIONS: ICD-10-CM

## 2022-06-20 DIAGNOSIS — F32.0 CURRENT MILD EPISODE OF MAJOR DEPRESSIVE DISORDER, UNSPECIFIED WHETHER RECURRENT (HCC): ICD-10-CM

## 2022-06-20 DIAGNOSIS — E11.9 TYPE 2 DIABETES MELLITUS WITHOUT COMPLICATION, WITHOUT LONG-TERM CURRENT USE OF INSULIN (HCC): ICD-10-CM

## 2022-06-20 DIAGNOSIS — I10 PRIMARY HYPERTENSION: ICD-10-CM

## 2022-06-20 DIAGNOSIS — I70.0 ATHEROSCLEROSIS OF AORTA (HCC): ICD-10-CM

## 2022-06-20 DIAGNOSIS — Z01.818 PRE-OP EVALUATION: Primary | ICD-10-CM

## 2022-06-20 PROBLEM — N18.31 STAGE 3A CHRONIC KIDNEY DISEASE (HCC): Status: ACTIVE | Noted: 2022-06-20

## 2022-06-20 LAB
ANION GAP SERPL CALCULATED.3IONS-SCNC: 8 MMOL/L (ref 4–13)
APTT PPP: 29 SECONDS (ref 23–37)
BUN SERPL-MCNC: 19 MG/DL (ref 5–25)
CALCIUM SERPL-MCNC: 9.5 MG/DL (ref 8.3–10.1)
CHLORIDE SERPL-SCNC: 106 MMOL/L (ref 100–108)
CO2 SERPL-SCNC: 26 MMOL/L (ref 21–32)
CREAT SERPL-MCNC: 0.96 MG/DL (ref 0.6–1.3)
GFR SERPL CREATININE-BSD FRML MDRD: 61 ML/MIN/1.73SQ M
GLUCOSE P FAST SERPL-MCNC: 107 MG/DL (ref 65–99)
INR PPP: 0.89 (ref 0.84–1.19)
POTASSIUM SERPL-SCNC: 3.7 MMOL/L (ref 3.5–5.3)
PROTHROMBIN TIME: 11.7 SECONDS (ref 11.6–14.5)
SODIUM SERPL-SCNC: 140 MMOL/L (ref 136–145)

## 2022-06-20 PROCEDURE — 93000 ELECTROCARDIOGRAM COMPLETE: CPT | Performed by: NURSE PRACTITIONER

## 2022-06-20 PROCEDURE — 3078F DIAST BP <80 MM HG: CPT | Performed by: NURSE PRACTITIONER

## 2022-06-20 PROCEDURE — 4004F PT TOBACCO SCREEN RCVD TLK: CPT | Performed by: NURSE PRACTITIONER

## 2022-06-20 PROCEDURE — 36415 COLL VENOUS BLD VENIPUNCTURE: CPT

## 2022-06-20 PROCEDURE — 3008F BODY MASS INDEX DOCD: CPT | Performed by: NURSE PRACTITIONER

## 2022-06-20 PROCEDURE — 3077F SYST BP >= 140 MM HG: CPT | Performed by: NURSE PRACTITIONER

## 2022-06-20 PROCEDURE — 99215 OFFICE O/P EST HI 40 MIN: CPT | Performed by: NURSE PRACTITIONER

## 2022-06-20 PROCEDURE — 85730 THROMBOPLASTIN TIME PARTIAL: CPT

## 2022-06-20 PROCEDURE — 80048 BASIC METABOLIC PNL TOTAL CA: CPT

## 2022-06-20 PROCEDURE — 1160F RVW MEDS BY RX/DR IN RCRD: CPT | Performed by: NURSE PRACTITIONER

## 2022-06-20 PROCEDURE — 85610 PROTHROMBIN TIME: CPT

## 2022-06-20 RX ORDER — CYCLOSPORINE 0.5 MG/ML
EMULSION OPHTHALMIC
COMMUNITY

## 2022-06-20 RX ORDER — CEPHALEXIN 250 MG/1
CAPSULE ORAL
COMMUNITY

## 2022-06-20 NOTE — ASSESSMENT & PLAN NOTE
Lab Results   Component Value Date    EGFR 66 06/09/2022    EGFR 87 02/07/2022    EGFR 86 11/05/2021    CREATININE 0 90 06/09/2022    CREATININE 0 72 02/07/2022    CREATININE 0 73 11/05/2021     Recent GFR normal   The patient is due for a repeat  This will be performed today

## 2022-06-20 NOTE — ASSESSMENT & PLAN NOTE
Lab Results   Component Value Date    HGBA1C 6 5 (H) 05/31/2022     The patient's most recent hemoglobin A1c is 6 5  The patient had been diagnosed with diabetes in the past but has been off medication for some time with normal hemoglobin A1c levels  She does have family history of diabetes in her sister  Treatment regimens were discussed  The patient has been on metformin in the past   Metformin 500 mg twice daily was sent to her pharmacy  Information was provided to the patient in the form of a booklet regarding carbohydrate counting  Diabetic foot exam was performed in the office today  Patient had an eye exam recently we will contact her ophthalmologist for those results    Dietary modifications and weight loss recommended

## 2022-06-20 NOTE — PATIENT INSTRUCTIONS
Cigarette Smoking and Your Health   AMBULATORY CARE:   Risks to your health if you smoke:  Nicotine and other chemicals found in tobacco and e-cigarettes can damage every cell in your body  Even if you are a light smoker, you have an increased risk for cancer, heart disease, and lung disease  If you are pregnant or have diabetes, smoking increases your risk for complications  Nicotine can affect an adolescent's developing brain  This can lead to trouble thinking, learning, or paying attention  Benefits to your health if you stop smoking:   · You decrease respiratory symptoms such as coughing, wheezing, and shortness of breath  · You reduce your risk for cancers of the lung, mouth, throat, kidney, bladder, pancreas, stomach, and cervix  If you already have cancer, you increase the benefits of chemotherapy  You also reduce your risk for cancer returning or a second cancer from developing  · You reduce your risk for heart disease, blood clots, heart attack, and stroke  · You reduce your risk for lung infections, and diseases such as pneumonia, asthma, chronic bronchitis, and emphysema  · Your circulation improves  More oxygen can be delivered to your body  If you have diabetes, you lower your risk for complications, such as kidney, artery, and eye diseases  You also lower your risk for nerve damage  Nerve damage can lead to amputations, poor vision, and blindness  · You improve your body's ability to heal and to fight infections  · An adolescent can help his or her brain and body develop in a healthy way  Talk to your adolescent about all the health risks of nicotine  If you can, start talking about nicotine when your child is younger than 12 years  This may make it easier for him or her not to start using nicotine as a teenager or adult  Explain to him or her that it is best never to start  It can be hard to try to quit later      Benefits to the health of others if you stop smoking:  Tobacco is harmful to nonsmokers who breathe in your secondhand smoke  The following are ways the health of others around you may improve when you stop smoking:  · You lower the risks for lung cancer and heart disease in nonsmoking adults  · If you are pregnant, you lower the risk for miscarriage, early delivery, low birth weight, and stillbirth  You also lower your baby's risk for SIDS, obesity, developmental delay, and neurobehavioral problems, such as ADHD  · If you have children, you lower their risk for ear infections, colds, pneumonia, bronchitis, and asthma  Follow up with your doctor as directed:  Write down your questions so you remember to ask them during your visits  For support and more information:   · American [employer] Association  [address] [facility]  [place] [address] [facility]  Phone: [facility] [address]  Phone: 1- 902 - 529-1692  Web Address: Luigi Hong  [employer]    · Smokefree  gov  Phone: 3- 456 - 686-8645  Web Address: www smokefree  [employer] [facility] 21 2022 Information is for End User's use only and may not be sold, redistributed or otherwise used for commercial purposes  All illustrations and images included in CareNotes® are the copyrighted property of A D A M , Inc  or [address] [place] [address]  The above information is an  only  It is not intended as medical advice for individual conditions or treatments  Talk to your doctor, nurse or pharmacist before following any medical regimen to see if it is safe and effective for you  Chronic Kidney Disease   AMBULATORY CARE:   Chronic kidney disease (CKD)  is the gradual and permanent loss of kidney function  Normally, the kidneys remove fluid, chemicals, and waste from your blood  These wastes are turned into urine by your kidneys  CKD may worsen over time and lead to kidney failure         Common signs and symptoms include the following:   · Changes in how often you need to urinate    · Swelling in your arms, legs, or feet    · Shortness of breath    · Fatigue or weakness    · Bad or bitter taste in your mouth    · Nausea, vomiting, or loss of appetite    Call your local emergency number (911 in the [address]) if:   · You have a seizure  · You have shortness of breath  Seek care immediately if:   · You are confused and very drowsy  Call your doctor or nephrologist if:   · You suddenly gain or lose more weight than your healthcare provider has told you is okay  · You have itchy skin or a rash  · You urinate more or less than you normally do  · You have blood in your urine  · You have nausea and are vomiting  · You have fatigue or muscle weakness  · You have hiccups that will not stop  · You have questions or concerns about your condition or care  How CKD is diagnosed:  CKD has 5 stages  Your healthcare provider will use results from the following tests to find the stage of CKD you have:  · Blood and urine tests  show how well your kidneys are working  They may also show the cause of your CKD  · Ultrasound, CT scan, or MRI  pictures may be used to check your kidneys  You may be given contrast liquid to help your kidneys show up better in the pictures  Tell the healthcare provider if you have ever had an allergic reaction to contrast liquid  Do not enter the MRI room with anything metal  Metal can cause serious injury  Tell the healthcare provider if you have any metal in or on your body  · A biopsy  is a procedure to remove a small piece of tissue from your kidney  It is done to find the cause of your CKD  Treatment  can help control signs and symptoms, and prevent a worse stage of CKD  Your care team may include specialists, such as a dietitian or a heart specialist  This depends on the stage of your CKD and if you have other health conditions to manage  Healthcare providers will work with you to create a plan based on your decisions for treatment   Your treatment plan may include any of the following:  · Medicines may be given to decrease your blood pressure and get rid of extra fluid  You may also receive medicine to manage health conditions that may occur with CKD, such as anemia, diabetes, and heart disease  · Dialysis  is a treatment to remove chemicals and waste from your blood when your kidneys can no longer do this  · Surgery  may be needed to create an arteriovenous fistula (AVF) in your arm or insert a catheter into your abdomen  This is done so you can receive dialysis  · A kidney transplant  may be done if your CKD becomes severe  What you can do to manage CKD: Management may include making some lifestyle changes  Tell your healthcare provider if you have any concerns about being able to make changes  He or she can help you find solutions, including working with specialists  Ask for help creating a plan to break large goals into smaller steps  Your plan may include any of the following:  · Manage other health conditions  Your healthcare provider will work with you to make a care plan that meets your needs  You will be checked regularly for heart disease or other conditions that can make CKD worse, such as diabetes  Your blood pressure will be closely monitored  You will also get a target blood pressure and help making a plan to reach your target  This may include taking your blood pressure at home  · Maintain a healthy weight  Your weight and body mass index (BMI) will be checked regularly  BMI helps find if your weight is healthy for your height  Your healthcare provider will use other tests to check your muscle and protein levels  Extra weight can strain your kidneys  A low weight or low muscle mass can make you feel more tired  You may have trouble doing your daily activities  Ask your provider what a healthy weight is for you  He or she can help you create a plan to lose or gain weight safely, if needed  The plan may include keeping a food diary   This is a list of foods and liquids you have each day  Your provider will use the diary to help you make changes, if needed  Changes are based on your health and any other conditions you have, such as diabetes  · Create an exercise plan  Regular exercise can help you manage CKD, high blood pressure, and diabetes  Exercise also helps control weight  Your provider can help you create exercise goals and a plan to reach those goals  For example, your goal may be to exercise for 30 minutes in a day  Your plan can include breaking exercise into 10 minute sessions, 3 times during the day  · Create a healthy eating plan  Your provider may tell you to eat food low in potassium, phosphorus, or protein  Your provider may also recommend vitamin or mineral supplements  Do not take any supplements without talking to your provider  A dietitian can help you plan meals if needed  Ask how much liquid to drink each day and which liquids are best for you  · Limit sodium (salt) as directed  You may need to limit sodium to less than 2,300 milligrams (mg) each day  Ask your dietitian or healthcare provider how much sodium you can have each day  The amount depends on your stage of kidney disease  Table salt, canned foods, soups, salted snacks, and processed meats, like deli meats and sausage, are high in sodium  Your provider or a dietitian can show you how to read food labels for sodium  · Limit alcohol as directed  Alcohol can cause fluid retention and can affect your kidneys  Ask how much alcohol is safe for you  A drink of alcohol is 12 ounces of beer, 5 ounces of wine, or 1½ ounces of liquor  · Do not smoke  Nicotine and other chemicals in cigarettes and cigars can cause kidney damage  Ask your provider for information if you currently smoke and need help to quit  E-cigarettes or smokeless tobacco still contain nicotine  Talk to your provider before you use these products  · Ask about over-the-counter medicines    Medicines such as NSAIDs and laxatives may harm your kidneys  Some cough and cold medicines can raise your blood pressure  Always ask if a medicine is safe before you take it  · Ask about vaccines you may need  CKD can increase your risk for infections such as pneumonia, influenza, and hepatitis  Vaccines lower your risk for infection  Your healthcare provider will tell you which vaccines you need and when to get them  Follow up with your doctor or nephrologist as directed: You will need to return for tests to monitor your kidney and nerve function, and your parathyroid hormone level  Your medicines may be changed, based on certain test results  Write down your questions so you remember to ask them during your visits  © Copyright [employer] 2022 Information is for End User's use only and may not be sold, redistributed or otherwise used for commercial purposes  All illustrations and images included in CareNotes® are the copyrighted property of A D A M , Inc  or Meg Carter   The above information is an  only  It is not intended as medical advice for individual conditions or treatments  Talk to your doctor, nurse or pharmacist before following any medical regimen to see if it is safe and effective for you  Good fat  Good fats come mainly from vegetables, nuts, seeds, and fish  They differ from saturated fats by having fewer hydrogen atoms bonded to their carbon chains  Healthy fats are liquid at room temperature, not solid  There are two broad categories of beneficial fats: monounsaturated and polyunsaturated fats  Monounsaturated fats  When you dip your bread in olive oil at an itrea, you're getting mostly monounsaturated fat  Monounsaturated fats have a single carbon-to-carbon double bond  The result is that it has two fewer hydrogen atoms than a saturated fat and a bend at the double bond  This structure keeps monounsaturated fats liquid at room temperature      Good sources of monounsaturated fats are olive oil, peanut oil, canola oil, avocados, and most nuts, as well as high-oleic safflower and sunflower oils  The discovery that monounsaturated fat could be healthful came from the [place] Study during the 1960s  It revealed that people in [place] and other parts of the [facility] enjoyed a low rate of heart disease despite a high-fat diet  The main fat in their diet, though, was not the saturated animal fat common in countries with higher rates of heart disease  It was olive oil, which contains mainly monounsaturated fat  This finding produced a surge of interest in olive oil and the "Mediterranean diet," a style of eating regarded as a healthful choice today  Although there's no recommended daily intake of monounsaturated fats, the [place] of [employer] recommends using them as much as possible along with polyunsaturated fats to replace saturated and trans fats  Polyunsaturated fats  When you pour liquid cooking oil into a pan, there's a good chance you're using polyunsaturated fat  Corn oil, sunflower oil, and safflower oil are common examples  Polyunsaturated fats are essential fats  That means they're required for normal body functions but your body can't make them  So you must get them from food  Polyunsaturated fats are used to build cell membranes and the covering of nerves  They are needed for blood clotting, muscle movement, and inflammation  A polyunsaturated fat has two or more double bonds in its carbon chain  There are two main types of polyunsaturated fats: omega-3 fatty acids and omega-6 fatty acids  The numbers refer to the distance between the beginning of the carbon chain and the first double bond  Both types offer health benefits  Eating polyunsaturated fats in place of saturated fats or highly refined carbohydrates reduces harmful LDL cholesterol and improves the cholesterol profile  It also lowers triglycerides      Good sources of omega-3 fatty acids include fatty fish such as salmon, mackerel, and sardines, flaxseeds, walnuts, canola oil, and unhydrogenated soybean oil  Omega-3 fatty acids may help prevent and even treat heart disease and stroke  In addition to reducing blood pressure, raising HDL, and lowering triglycerides, polyunsaturated fats may help prevent lethal heart rhythms from arising  Evidence also suggests they may help reduce the need for corticosteroid medications in people with rheumatoid arthritis  Studies linking omega-3s to a wide range of other health improvements, including reducing risk of dementia, are inconclusive, and some of them have major flaws, according to a systematic review of the evidence by the Agency for Healthcare Research and Quality  Omega-6 fatty acids have also been linked to protection against heart disease  Foods rich in linoleic acid and other omega-6 fatty acids include vegetable oils such as safflower, soybean, sunflower, walnut, and corn oils  DASH Eating Plan   WHAT YOU NEED TO KNOW:   The DASH (Dietary Approaches to Stop Hypertension) Eating Plan is designed to help prevent or lower high blood pressure  It can also help to lower LDL (bad) cholesterol and decrease your risk for heart disease  The plan is low in sodium, sugar, unhealthy fats, and total fat  It is high in potassium, calcium, magnesium, and fiber  These nutrients are added when you eat more fruits, vegetables, and whole grains  With the DASH eating plan, you need to eat a certain number of servings from each food group  This will help you get enough of certain nutrients and limit others  The amount of servings you should eat depends on how many calories you need  Your dietitian can help you create meal plans with the right number of servings for each food group  DISCHARGE INSTRUCTIONS:   What you need to know about sodium:  Your dietitian will tell you how much sodium is safe for you to have each day   People with high blood pressure should have no more than 1,500 to 2,300 mg of sodium in a day  A teaspoon (tsp) of salt has 2,300 mg of sodium  This may seem like a difficult goal, but small changes to the foods you eat can make a big difference  Your healthcare provider or dietitian can help you create a meal plan that follows your sodium limit  1  Read food labels  Food labels can help you choose foods that are low in sodium  The amount of sodium is listed in milligrams (mg)  The % Daily Value (DV) column tells you how much of your daily needs are met by 1 serving of the food for each nutrient listed  Choose foods that have less than 5% of the DV of sodium  These foods are considered low in sodium  Foods that have 20% or more of the DV of sodium are considered high in sodium  Avoid foods that have more than 300 mg of sodium in each serving  Choose foods that say low-sodium, reduced-sodium, or no salt added on the food label  2  Limit added salt  Do not salt food at the table if you add salt when you cook  Use herbs and spices, such as onions, garlic, and salt-free seasonings to add flavor  Try lemon or lime juice or vinegar to add a tart flavor  Use hot peppers or a small amount of hot pepper sauce to add a spicy flavor  Limit foods high in added salt, such as the following:    ? Seasonings made with salt, such as garlic salt, celery salt, onion salt, seasoned salt, meat tenderizers, and monosodium glutamate (MSG)    ? Miso soup and canned or dried soup mixes    ? Regular soy sauce, barbecue sauce, teriyaki sauce, steak sauce, Worcestershire sauce, and most flavored vinegars    ? Snack foods, such as salted chips, popcorn, pretzels, pork rinds, salted crackers, and salted nuts    ? Frozen foods, such as dinners, entrees, vegetables with sauces, and breaded meats    3  Ask about salt substitutes  Ask your healthcare provider if you may use salt substitutes   Some salt substitutes have ingredients that can be harmful if you have certain health conditions  4  Choose foods carefully at restaurants  Meals from restaurants, especially fast food restaurants, are often high in sodium  Some restaurants have nutrition information that tells you the amount of sodium in their foods  Ask to have your food prepared with less, or no salt  What you need to know about fats:  Healthy fats include unsaturated fats and omega-3 fatty acids  Unhealthy fats include saturated fats and trans fats  1  Include healthy fats, such as the following:      ? Cooking oils, such as soybean, canola, olive, or sunflower    ? Fatty fish, such as salmon, tuna, mackerel, or sardines    ? Flaxseed oil or ground flaxseed    ? ½ cup of cooked beans, such as black beans, kidney beans, or ring beans    ? 1½ ounces of low-sodium nuts, such as almonds or walnuts    ? Low-sugar, low-sodium peanut butter    ? Seeds such as mame seeds or sunflower seeds       2  Limit or do not have unhealthy fats, such as the following:      ? Foods that contain fat from animals, such as fatty meats, whole milk, butter, and cream    ? Shortening, stick margarine, palm oil, and coconut oil    ? Full-fat or creamy salad dressing    ? Creamy soup    ? Crackers, chips, and baked goods made with margarine or shortening    ? Foods that are fried in unhealthy fats    ? Gravy and sauces, such as Franco or cheese sauces    What you need to know about carbohydrates (carbs): All carbs break down into sugar  Complex carbs contain more fiber than simple carbs  This means complex carbs go into the bloodstream more slowly and cause less of a blood sugar spike  Try to include more complex carbs and fewer simple carbs  1  Include complex carbs, such as the following:      ? 1 slice of whole-grain bread    ? 1 ounce of dry cereal that does not contain added sugar    ? ½ cup of cooked oatmeal    ? 2 ounces of cooked whole-grain pasta    ? ½ cup of cooked brown rice    2   Limit or do not have simple carbs, such as the following:      ? Baked goods, such as doughnuts, pastries, and cookies    ? Mixes for cornbread and biscuits    ? White rice and pasta mixes, such as boxed macaroni and cheese    ? Instant and cold cereals that contain sugar    ? Jelly, jam, and ice cream that contain sugar    ? Condiments such as ketchup    ? Drinks high in sugar, such as soft drinks, lemonade, and fruit juice    What you need to know about vegetables and fruits:  Vegetables and fruits can be fresh, frozen, or canned  If possible, try to choose low-sodium canned options  1  Include a variety of vegetables and fruits, such as the following:      ? 1 medium apple, pear, or peach (about ½ cup chopped)    ? ½ small banana    ? ½ cup berries, such as blueberries, strawberries, or blackberries    ? 1 cup of raw leafy greens, such as lettuce, spinach, kale, or andres greens    ? ½ cup of frozen or canned (no added salt) vegetables, such as green beans    ? ½ cup of fresh, frozen, or canned fruit (canned in light syrup or fruit juice)    ? ½ cup of vegetable or fruit juice    2  Limit or do not have vegetables and fruits made in the following ways:      ? Frozen fruit such as cherries that have added sugar    ? Fruit in cream or butter sauce    ? Canned vegetables that are high in sodium    ? Sauerkraut, pickled vegetables, and other foods prepared in brine    ? Fried vegetables or vegetables in butter or high-fat sauces    What you need to know about protein foods:   1  Include lean or low-fat protein foods, such as the following:      ? Poultry (chicken, turkey) with no skin    ? Fish (especially fatty fish, such as salmon, fresh tuna, or mackerel)    ? Lean beef and pork (loin, round, extra lean hamburger)    ? Egg whites and egg substitutes    ? 1 cup of nonfat (skim) or 1% milk    ? 1½ ounces of fat-free or low-fat cheese    ? 6 ounces of nonfat or low-fat yogurt    2   Limit or do not have high-fat protein foods, such as the following: ? Smoked or cured meat, such as corned beef, solis, ham, hot dogs, and sausage    ? Canned beans and canned meats or spreads, such as potted meats, sardines, anchovies, and imitation seafood    ? Deli or lunch meats, such as bologna, ham, turkey, and roast beef    ? High-fat meat (T-bone steak, regular hamburger, and ribs)    ? Whole eggs and egg yolks    ? Whole milk, 2% milk, and cream    ? Regular cheese and processed cheese    Other guidelines to follow:   · Maintain a healthy weight  Your risk for heart disease is higher if you are overweight  Your healthcare provider may suggest that you lose weight if you are overweight  You can lose weight by eating fewer calories and foods that have added sugars and fat  The DASH meal plan can help you do this  Decrease calories by eating smaller portions at each meal and fewer snacks  Ask your healthcare provider for more information about how to lose weight  · Exercise regularly  Regular exercise can help you reach or maintain a healthy weight  Regular exercise can also help decrease your blood pressure and improve your cholesterol levels  Get 30 minutes or more of moderate exercise each day of the week  To lose weight, get at least 60 minutes of exercise  Talk to your healthcare provider about the best exercise program for you  · Limit alcohol  Women should limit alcohol to 1 drink a day  Men should limit alcohol to 2 drinks a day  A drink of alcohol is 12 ounces of beer, 5 ounces of wine, or 1½ ounces of liquor  For more information:   · [facility] and [address] [address]  [place] [address]  Phone: 0- 753 - 698-3719  Web Address: [facility] no    © [address] [facility] 2022 Information is for End User's use only and may not be sold, redistributed or otherwise used for commercial purposes   All illustrations and images included in CareNotes® are the copyrighted property of [employer]  or [address]   The above information is an  only  It is not intended as medical advice for individual conditions or treatments  Talk to your doctor, nurse or pharmacist before following any medical regimen to see if it is safe and effective for you  10% - bad control"> 10% - bad control,Hemoglobin A1c (HbA1c) greater than 10% indicating poor diabetic control,Haemoglobin A1c greater than 10% indicating poor diabetic control">   Diabetes Mellitus Type 2 in Adults, Ambulatory Care   GENERAL INFORMATION:   Diabetes mellitus type 2  is a disease that affects how your body uses glucose (sugar)  Insulin helps move sugar out of the blood so it can be used for energy  Normally, when the blood sugar level increases, the pancreas makes more insulin  Type 2 diabetes develops because either the body cannot make enough insulin, or it cannot use the insulin correctly  After many years, your pancreas may stop making insulin  Common symptoms include the following:   · More hunger or thirst than usual     · Frequent urination     · Weight loss without trying     · Blurred vision  Seek immediate care for the following symptoms:   · Severe abdominal pain, or pain that spreads to your back  You may also be vomiting  · Trouble staying awake or focusing    · Shaking or sweating    · Blurred or double vision    · Breath has a fruity, sweet smell    · Breathing is deep and labored, or rapid and shallow    · Heartbeat is fast and weak  Treatment for diabetes mellitus type 2  includes keeping your blood sugar at a normal level  You must eat the right foods, and exercise regularly  You may also need medicine if you cannot control your blood sugar level with nutrition and exercise  Manage diabetes mellitus type 2:   · Check your blood sugar level  You will be taught how to check a small drop of blood in a glucose monitor   Ask your healthcare provider when and how often to check during the day  Ask your healthcare provider what your blood sugar levels should be when you check them  · Keep track of carbohydrates (sugar and starchy foods)  Your blood sugar level can get too high if you eat too many carbohydrates  Your dietitian will help you plan meals and snacks that have the right amount of carbohydrates  · Eat low-fat foods  Some examples are skinless chicken and low-fat milk  · Eat less sodium (salt)  Some examples of high-sodium foods to limit are soy sauce, potato chips, and soup  Do not add salt to food you cook  Limit your use of table salt  · Eat high-fiber foods  Foods that are a good source of fiber include vegetables, whole grain bread, and beans  · Limit alcohol  Alcohol affects your blood sugar level and can make it harder to manage your diabetes  Women should limit alcohol to 1 drink a day  Men should limit alcohol to 2 drinks a day  A drink of alcohol is 12 ounces of beer, 5 ounces of wine, or 1½ ounces of liquor  · Get regular exercise  Exercise can help keep your blood sugar level steady, decrease your risk of heart disease, and help you lose weight  Exercise for at least 30 minutes, 5 days a week  Include muscle strengthening activities 2 days each week  Work with your healthcare provider to create an exercise plan  · Check your feet each day  for injuries or open sores  Ask your healthcare provider for activities you can do if you have an open sore  · Quit smoking  If you smoke, it is never too late to quit  Smoking can worsen the problems that may occur with diabetes  Ask your healthcare provider for information about how to stop smoking if you are having trouble quitting  · Ask about your weight:  Ask healthcare providers if you need to lose weight, and how much to lose  Ask them to help you with a weight loss program  Even a 10 to 15 pound weight loss can help you manage your blood sugar level       · Carry medical alert identification  Wear medical alert jewelry or carry a card that says you have diabetes  Ask your healthcare provider where to get these items  · Ask about vaccines  Diabetes puts you at risk of serious illness if you get the flu, pneumonia, or hepatitis  Ask your healthcare provider if you should get a flu, pneumonia, or hepatitis B vaccine, and when to get the vaccine  Follow up with your healthcare provider as directed:  Write down your questions so you remember to ask them during your visits  CARE AGREEMENT:   You have the right to help plan your care  Learn about your health condition and how it may be treated  Discuss treatment options with your caregivers to decide what care you want to receive  You always have the right to refuse treatment  The above information is an  only  It is not intended as medical advice for individual conditions or treatments  Talk to your doctor, nurse or pharmacist before following any medical regimen to see if it is safe and effective for you  © 2014 [address] is for End User's use only and may not be sold, redistributed or otherwise used for commercial purposes  All illustrations and images included in CareNotes® are the copyrighted property of [employer]  or [employer]  Weight Management   WHAT YOU NEED TO KNOW:   Being overweight increases your risk of health conditions such as heart disease, high blood pressure, type 2 diabetes, and certain types of cancer  It can also increase your risk for osteoarthritis, sleep apnea, and other respiratory problems  Aim for a slow, steady weight loss  Even a small amount of weight loss can lower your risk of health problems  DISCHARGE INSTRUCTIONS:   How to lose weight safely:  A safe and healthy way to lose weight is to eat fewer calories and get regular exercise    · You can lose up about 1 pound a week by decreasing the number of calories you eat by 500 calories each day  You can decrease calories by eating smaller portion sizes or by cutting out high-calorie foods  Read labels to find out how many calories are in the foods you eat  · You can also burn calories with exercise such as walking, swimming, or biking  You will be more likely to keep weight off if you make these changes part of your lifestyle  Exercise at least 30 minutes per day on most days of the week  You can also fit in more physical activity by taking the stairs instead of the elevator or parking farther away from stores  Ask your healthcare provider about the best exercise plan for you  Healthy meal plan for weight management:  A healthy meal plan includes a variety of foods, contains fewer calories, and helps you stay healthy  A healthy meal plan includes the following:     · Eat whole-grain foods more often  A healthy meal plan should contain fiber  Fiber is the part of grains, fruits, and vegetables that is not broken down by your body  Whole-grain foods are healthy and provide extra fiber in your diet  Some examples of whole-grain foods are whole-wheat breads and pastas, oatmeal, brown rice, and bulgur  · Eat a variety of vegetables every day  Include dark, leafy greens such as spinach, kale, andres greens, and mustard greens  Eat yellow and orange vegetables such as carrots, sweet potatoes, and winter squash  · Eat a variety of fruits every day  Choose fresh or canned fruit (canned in its own juice or light syrup) instead of juice  Fruit juice has very little or no fiber  · Eat low-fat dairy foods  Drink fat-free (skim) milk or 1% milk  Eat fat-free yogurt and low-fat cottage cheese  Try low-fat cheeses such as mozzarella and other reduced-fat cheeses  · Choose meat and other protein foods that are low in fat  Choose beans or other legumes such as split peas or lentils  Choose fish, skinless poultry (chicken or turkey), or lean cuts of red meat (beef or pork)   Before you cook meat or poultry, cut off any visible fat  · Use less fat and oil  Try baking foods instead of frying them  Add less fat, such as margarine, sour cream, regular salad dressing, and mayonnaise to foods  Eat fewer high-fat foods  Some examples of high-fat foods include french fries, doughnuts, ice cream, and cakes  · Eat fewer sweets  Limit foods and drinks that are high in sugar  This includes candy, cookies, regular soda, and sweetened drinks  Ways to decrease calories:   1  Eat smaller portions  ? Use a small plate with smaller servings  ? Do not eat second helpings  ? When you eat at a restaurant, ask for a box and place half of your meal in the box before you eat  ? Share an entrée with someone else  1  Replace high-calorie snacks with healthy, low-calorie snacks  ? Choose fresh fruit, vegetables, fat-free rice cakes, or air-popped popcorn instead of potato chips, nuts, or chocolate  ? Choose water or calorie-free drinks instead of soda or sweetened drinks  · Do not shop for groceries when you are hungry  You may be more likely to make unhealthy food choices  Take a grocery list of healthy foods and shop after you have eaten  · Eat regular meals  Do not skip meals  Skipping meals can lead to overeating later in the day  This can make it harder for you to lose weight  Eat a healthy snack in place of a meal if you do not have time to eat a regular meal  Talk with a dietitian to help you create a meal plan and schedule that is right for you  Other things to consider as you try to lose weight:   · Be aware of situations that may give you the urge to overeat, such as eating while watching television  Find ways to avoid these situations  For example, read a book, go for a walk, or do crafts  · Meet with a weight loss support group or friends who are also trying to lose weight  This may help you stay motivated to continue working on your weight loss goals      © Copyright [employer] 2022 Information is for End User's use only and may not be sold, redistributed or otherwise used for commercial purposes  All illustrations and images included in CareNotes® are the copyrighted property of A [employer] M [employer]  or [employer]  The above information is an  only  It is not intended as medical advice for individual conditions or treatments  Talk to your doctor, nurse or pharmacist before following any medical regimen to see if it is safe and effective for you  10% - bad control"> 10% - bad control,Hemoglobin A1c (HbA1c) greater than 10% indicating poor diabetic control,Haemoglobin A1c greater than 10% indicating poor diabetic control">   Diabetes Mellitus Type 2 in Adults, Ambulatory Care   GENERAL INFORMATION:   Diabetes mellitus type 2  is a disease that affects how your body uses glucose (sugar)  Insulin helps move sugar out of the blood so it can be used for energy  Normally, when the blood sugar level increases, the pancreas makes more insulin  Type 2 diabetes develops because either the body cannot make enough insulin, or it cannot use the insulin correctly  After many years, your pancreas may stop making insulin  Common symptoms include the following:   · More hunger or thirst than usual     · Frequent urination     · Weight loss without trying     · Blurred vision  Seek immediate care for the following symptoms:   · Severe abdominal pain, or pain that spreads to your back  You may also be vomiting  · Trouble staying awake or focusing    · Shaking or sweating    · Blurred or double vision    · Breath has a fruity, sweet smell    · Breathing is deep and labored, or rapid and shallow    · Heartbeat is fast and weak  Treatment for diabetes mellitus type 2  includes keeping your blood sugar at a normal level  You must eat the right foods, and exercise regularly   You may also need medicine if you cannot control your blood sugar level with nutrition and exercise  Manage diabetes mellitus type 2:   · Check your blood sugar level  You will be taught how to check a small drop of blood in a glucose monitor  Ask your healthcare provider when and how often to check during the day  Ask your healthcare provider what your blood sugar levels should be when you check them  · Keep track of carbohydrates (sugar and starchy foods)  Your blood sugar level can get too high if you eat too many carbohydrates  Your dietitian will help you plan meals and snacks that have the right amount of carbohydrates  · Eat low-fat foods  Some examples are skinless chicken and low-fat milk  · Eat less sodium (salt)  Some examples of high-sodium foods to limit are soy sauce, potato chips, and soup  Do not add salt to food you cook  Limit your use of table salt  · Eat high-fiber foods  Foods that are a good source of fiber include vegetables, whole grain bread, and beans  · Limit alcohol  Alcohol affects your blood sugar level and can make it harder to manage your diabetes  Women should limit alcohol to 1 drink a day  Men should limit alcohol to 2 drinks a day  A drink of alcohol is 12 ounces of beer, 5 ounces of wine, or 1½ ounces of liquor  · Get regular exercise  Exercise can help keep your blood sugar level steady, decrease your risk of heart disease, and help you lose weight  Exercise for at least 30 minutes, 5 days a week  Include muscle strengthening activities 2 days each week  Work with your healthcare provider to create an exercise plan  · Check your feet each day  for injuries or open sores  Ask your healthcare provider for activities you can do if you have an open sore  · Quit smoking  If you smoke, it is never too late to quit  Smoking can worsen the problems that may occur with diabetes  Ask your healthcare provider for information about how to stop smoking if you are having trouble quitting       · Ask about your weight:  Ask healthcare providers if you need to lose weight, and how much to lose  Ask them to help you with a weight loss program  Even a 10 to 15 pound weight loss can help you manage your blood sugar level  · Carry medical alert identification  Wear medical alert jewelry or carry a card that says you have diabetes  Ask your healthcare provider where to get these items  · Ask about vaccines  Diabetes puts you at risk of serious illness if you get the flu, pneumonia, or hepatitis  Ask your healthcare provider if you should get a flu, pneumonia, or hepatitis B vaccine, and when to get the vaccine  Follow up with your healthcare provider as directed:  Write down your questions so you remember to ask them during your visits  CARE AGREEMENT:   You have the right to help plan your care  Learn about your health condition and how it may be treated  Discuss treatment options with your caregivers to decide what care you want to receive  You always have the right to refuse treatment  The above information is an  only  It is not intended as medical advice for individual conditions or treatments  Talk to your doctor, nurse or pharmacist before following any medical regimen to see if it is safe and effective for you  © 2014 [address] is for End User's use only and may not be sold, redistributed or otherwise used for commercial purposes  All illustrations and images included in CareNotes® are the copyrighted property of [employer] M [employer]  or [employer]  Basic Carbohydrate Counting   AMBULATORY CARE:   Carbohydrate counting  is a way to plan your meals by counting the amount of carbohydrate in foods  Carbohydrates are the sugars, starches, and fiber found in fruit, grains, vegetables, and milk products  Carbohydrates increase your blood sugar levels  Carbohydrate counting can help you eat the right amount of carbohydrate to keep your blood sugar levels under control     What you need to know about planning meals using carbohydrate counting:  · A dietitian or healthcare provider will help you develop a healthy meal plan that works best for you  You will be taught how much carbohydrate to eat or drink for each meal and snack  Your meal plan will be based on your age, weight, usual food intake, and physical activity level  If you have diabetes, it will also include your blood sugar levels and diabetes medicine  Once you know how much carbohydrate you should eat, you can decide what type of food you want to eat  · You will need to know what foods contain carbohydrate and how much they contain  Keep track of the amount of carbohydrate in meals and snacks in order to follow your meal plan  Do not avoid carbohydrates or skip meals  Your blood sugar may fall too low if you do not eat enough carbohydrate or you skip meals  Foods that contain carbohydrate:   · Breads:  Each serving of food listed below contains about 15 g of carbohydrate   ? 1 slice of bread (1 ounce) or 1 flour or corn tortilla (6 inch)    ? ½ of a hamburger bun or ¼ of a large bagel (about 1 ounce)    ? 1 pancake (about 4 inches across and ¼ inch thick)    · Cereals and grains:  Serving sizes of ready-to-eat cereals vary  Look at the serving size and the total carbohydrate amount listed on the food label  Each serving of food listed below contains about 15 g of carbohydrate   ? ¾ cup of dry, unsweetened, ready-to-eat cereal or ¼ cup of low-fat granola     ? ½ cup of oatmeal or other cooked cereal     ? ? cup of cooked rice or pasta    · Starchy vegetables and beans:  Each serving of food listed below contains about 15 g of carbohydrate   ? ½ cup of corn, green peas, sweet potatoes, or mashed potatoes    ? ¼ of a large baked potato    ? ½ cup of beans, lentils, and peas (garbanzo, ring, kidney, white, split, black-eyed)    · Crackers and snacks:  Each serving of food listed below contains about 15 g of carbohydrate       ? 3 faraz cracker squares or 8 animal crackers     ? 6 saltine-type crackers    ? 3 cups of popcorn or ¾ ounce of pretzels, potato chips, or tortilla chips    · Fruit:  Each serving of food listed below contains about 15 g of carbohydrate   ? 1 small (4 ounce) piece of fresh fruit or ¾ to 1 cup of fresh fruit    ? ½ cup of canned or frozen fruit, packed in natural juice    ? ½ cup (4 ounces) of unsweetened fruit juice    ? 2 tablespoons of dried fruit    · Desserts or sugary foods:  Each serving of food listed below contains about 15 g of carbohydrate   ? 2-inch square unfrosted cake or brownie     ? 2 small cookies    ? ½ cup of ice cream, frozen yogurt, or nondairy frozen yogurt    ? ¼ cup of sherbet or sorbet    ? 1 tablespoon of regular syrup, jam, or jelly    ? 2 tablespoons of light syrup    · Milk and yogurt:  Foods from the milk group contain about 12 g of carbohydrate per serving  ? 1 cup of fat-free or low-fat milk    ? 1 cup of soy milk    ? ? cup of fat-free, yogurt sweetened with artificial sweetener    · Non-starchy vegetables:  Each serving contains about 5 g of carbohydrate   Three servings of non-starch vegetables count as 1 carbohydrate serving  ? ½ cup of cooked vegetables or 1 cup of raw vegetables  This includes beets, broccoli, cabbage, cauliflower, cucumber, mushrooms, tomatoes, and zucchini    ? ½ cup of vegetable juice    How to use carbohydrate counting to plan meals:   · Count carbohydrate amounts using serving sizes:      ? Pasta dinner example: You plan to have pasta, tossed salad, and an 8-ounce glass of milk  Your healthcare provider tells you that you may have 4 carbohydrate servings for dinner  One carbohydrate serving of pasta is ? cup  One cup of pasta will equal 3 carbohydrate servings  An 8-ounce glass of milk will count as 1 carbohydrate serving  These amounts of food would equal 4 carbohydrate servings   One cup of tossed salad does not count toward your carbohydrate servings  · Count carbohydrate amounts using food labels:  Find the total amount of carbohydrate in a packaged food by reading the food label  Food labels tell you the serving size of the food and the total carbohydrate amount in each serving  Find the serving size on the food label and then decide how many servings you will eat  Multiply the number of servings you plan to eat by the carbohydrate amount per serving  ? Granola bar snack example: Your meal plan allows you to have 2 carbohydrate servings (30 grams) of carbohydrate for a snack  You plan to eat 1 package of granola bars, which contains 2 bars  According to the food label, the serving size of food in this package is 1 bar  Each serving (1 bar) contains 25 grams of carbohydrate  The total amount of carbohydrate in this package of granola bars would be 50 g  Based on your meal plan, you should eat only 1 bar  Follow up with your doctor as directed:  Write down your questions so you remember to ask them during your visits  © Copyright [employer] 2022 Information is for End User's use only and may not be sold, redistributed or otherwise used for commercial purposes  All illustrations and images included in CareNotes® are the copyrighted property of A [employer] A M [employer] Inc  or [address] [place] [address] [facility]  The above information is an  only  It is not intended as medical advice for individual conditions or treatments  Talk to your doctor, nurse or pharmacist before following any medical regimen to see if it is safe and effective for you  Foot Care for People with Diabetes   AMBULATORY CARE:   What you need to know about foot care:   · Foot care helps protect your feet and prevent foot ulcers or sores  Long-term high blood sugar levels can damage the blood vessels and nerves in your legs and feet  This damage makes it hard to feel pressure, pain, temperature, and touch  You may not be able to feel a cut or sore, or shoes that are too tight  Foot care is needed to prevent serious problems, such as an infection or amputation  · Diabetes may cause your toes to become crooked or curved under  These changes may affect the way you walk and can lead to increased pressure on your foot  The pressure can decrease blood flow to your feet  Lack of blood flow increases your risk for a foot ulcer  Do not ignore small problems, such as dry skin or small wounds  These can become life-threatening over time without proper care  Call your care team provider if:   · Your feet become numb, weak, or hard to move  · You have pus draining from a sore on your foot  · You have a wound on your foot that gets bigger, deeper, or does not heal      · You see blisters, cuts, scratches, calluses, or sores on your foot  · You have a fever, and your feet become red, warm, and swollen  · Your toenails become thick, curled, or yellow  · You find it hard to check your feet because your vision is poor  · You have questions or concerns about your condition or care  How to care for your feet:   · Check your feet each day  Look at your whole foot, including the bottom, and between and under your toes  Check for wounds, corns, and calluses  Use a mirror to see the bottom of your feet  The skin on your feet may be shiny, tight, or darker than normal  Your feet may also be cold and pale  Feel your feet by running your hands along the tops, bottoms, sides, and between your toes  Redness, swelling, and warmth are signs of blood flow problems that can lead to a foot ulcer  Do not try to remove corns or calluses yourself  · Wash your feet each day with soap and warm water  Do not use hot water, because this can injure your foot  Dry your feet gently with a towel after you wash them  Dry between and under your toes  · Apply lotion or a moisturizer on your dry feet  Ask your care team provider what lotions are best to use   Do not put lotion or moisturizer between your toes  Moisture between your toes could lead to skin breakdown  · Cut your toenails correctly  File or cut your toenails straight across  Use a soft brush to clean around your toenails  If your toenails are very thick, you may need to have a care team provider or specialist cut them  · Protect your feet  Do not walk barefoot or wear your shoes without socks  Check your shoes for rocks or other objects that can hurt your feet  Wear cotton socks to help keep your feet dry  Wear socks without toe seams, or wear them with the seams inside out  Change your socks each day  Do not wear socks that are dirty or damp  · Wear shoes that fit well  Wear shoes that do not rub against any area of your feet  Your shoes should be ½ to ¾ inch (1 to 2 centimeters) longer than your feet  Your shoes should also have extra space around the widest part of your feet  Walking or athletic shoes with laces or straps that adjust are best  Ask your care team provider for help to choose shoes that fit you best  Ask him or her if you need to wear an insert, orthotic, or bandage on your feet  · Go to your follow-up visits  Your care team provider will do a foot exam at least once a year  You may need a foot exam more often if you have nerve damage, foot deformities, or ulcers  He will check for nerve damage and how well you can feel your feet  He will check your shoes to see if they fit well  · Do not smoke  Smoking can damage your blood vessels and put you at increased risk for foot ulcers  Ask your care team provider for information if you currently smoke and need help to quit  E-cigarettes or smokeless tobacco still contain nicotine  Talk to your care team provider before you use these products  Follow up with your diabetes care team provider or foot specialist as directed: You will need to have your feet checked at least once a year   You may need a foot exam more often if you have nerve damage, foot deformities, or ulcers  Write down your questions so you remember to ask them during your visits  © Copyright [employer] 2022 Information is for End User's use only and may not be sold, redistributed or otherwise used for commercial purposes  All illustrations and images included in CareNotes® are the copyrighted property of [employer]  or Meg Chirinos  The above information is an  only  It is not intended as medical advice for individual conditions or treatments  Talk to your doctor, nurse or pharmacist before following any medical regimen to see if it is safe and effective for you

## 2022-06-20 NOTE — ASSESSMENT & PLAN NOTE
The patient's blood pressure in the office today is 140/74  The patient continues on her antihypertensive medications  Low-salt diet recommended  Weight loss recommended

## 2022-06-20 NOTE — ASSESSMENT & PLAN NOTE
The patient remains on her Lipitor and fenofibrate  She continues to smoke  Lifestyle modifications discussed  Low-fat low-cholesterol diet recommended    Component      Latest Ref Rng & Units 2/7/2022   Cholesterol      See Comment mg/dL 153   Triglycerides      See Comment mg/dL 205 (H)   HDL      >=50 mg/dL 42 (L)   LDL Calculated      0 - 100 mg/dL 70

## 2022-06-20 NOTE — ASSESSMENT & PLAN NOTE
Patient is scheduled for an upcoming surveillance CT of the chest   She continues to follow with cardiothoracic surgery  2/8/2022  CT CHEST WITHOUT IV CONTRAST     INDICATION:   R91 1: Solitary pulmonary nodule      COMPARISON:  Chest CT February 12, 2021  Correlation with chest CT November 3, 2020 and PET CT November 16, 2020      TECHNIQUE: CT examination of the chest was performed without intravenous contrast   Axial, sagittal, and coronal 2D reformatted images were created from the source data and submitted for interpretation      Radiation dose length product (DLP) for this visit:  122 mGy-cm   This examination, like all CT scans performed in the [facility], was performed utilizing techniques to minimize radiation dose exposure, including the use of iterative   reconstruction and automated exposure control       FINDINGS:     LUNGS:  Smoothly marginated, noncalcified 1 3 x 1 2 cm right lower lobe nodule is unchanged from index study of November 3, 2020 (series 202, image 101)  No intralesional lipid is identified      Mild centrilobular and paraseptal emphysema with no significant change in multiple tiny nodules throughout the lungs, which are again most conspicuous on the coronal maximum intensity projection images      0 4 cm filling defect along the posterior wall of proximal trachea is unchanged from index study of November 3, 2020 (series 205, image 10)     A tiny right paratracheal diverticulum is again noted (series 205, image 16)      PLEURA:  Unremarkable      HEART/GREAT VESSELS: Normal heart size  Mild degree of coronary artery calcifications and atherosclerotic calcifications of the aorta  Aberrant right subclavian artery, a normal anatomic variant    No thoracic aortic aneurysm      MEDIASTINUM AND ERIN:  Unremarkable      CHEST WALL AND LOWER NECK:   Unremarkable      VISUALIZED STRUCTURES IN THE UPPER ABDOMEN:  1 6 cm calculus in the right renal pelvis  Cholecystectomy clips     OSSEOUS STRUCTURES:  Spinal degenerative changes are noted  No acute fracture or destructive osseous lesion      IMPRESSION:     1  The 1 3 x 1 2 cm smoothly marginated right lower lobe nodule is unchanged from index study of November 3, 2020  Continued surveillance is advised    2   Subcentimeter nodularity along the posterior wall of proximal trachea is unchanged from index study of November 3, 2020

## 2022-06-20 NOTE — ASSESSMENT & PLAN NOTE
Well controlled at present  The patient continues to smoke approximately a half pack of cigarettes per day  smoking cessation recommended

## 2022-06-20 NOTE — PROGRESS NOTES
INTERNAL MEDICINE PRE-OPERATIVE EVALUATION  [facility]    NAME: Chetna Sandhu  AGE: 77 y o  SEX: female  : 1955     DATE: 2022     Internal Medicine Pre-Operative Evaluation:     Chief Complaint: Pre-operative Evaluation     Surgery: right carpal tunnel release surgery  Anticipated Date of Surgery: 2022  Referring Provider: Iván Giron MD        History of Present Illness:     Mark Beard is a 77 y o  female who presents to the office today for a preoperative consultation at the request of surgeon, Dr Tenzin Gutierrez, who plans on performing right carpal tunnel release surgery on 2022  Planned anesthesia is IV sedation  Patient has a bleeding risk of: no recent abnormal bleeding  Current anti-platelet/anti-coagulation medications that the patient is prescribed includes: NSAIDS  Assessment of Chronic Conditions:   1  Pre-op evaluation  -     POCT ECG    2  Type 2 diabetes mellitus without complication, without long-term current use of insulin (HCC)  Assessment & Plan:    Lab Results   Component Value Date    HGBA1C 6 5 (H) 2022     The patient's most recent hemoglobin A1c is 6 5  The patient had been diagnosed with diabetes in the past but has been off medication for some time with normal hemoglobin A1c levels  She does have family history of diabetes in her sister  Treatment regimens were discussed  The patient has been on metformin in the past   Metformin 500 mg twice daily was sent to her pharmacy  Information was provided to the patient in the form of a booklet regarding carbohydrate counting  Diabetic foot exam was performed in the office today  Patient had an eye exam recently we will contact her ophthalmologist for those results  Dietary modifications and weight loss recommended    Orders:  -     Hemoglobin A1C; Future; Expected date: 2022  -     metFORMIN (GLUCOPHAGE) 500 mg tablet;  Take 1 tablet (500 mg total) by mouth 2 (two) times a day with meals    3  Centrilobular emphysema ([address] )  Assessment & Plan:    Well controlled at present  The patient continues to smoke approximately a half pack of cigarettes per day  smoking cessation recommended  4  Current mild episode of major depressive disorder, unspecified whether recurrent ([address] )  Assessment & Plan:    Stable with current medication      5  Atherosclerosis of aorta ([address] )    6  Colon cancer screening  -     Ambulatory referral for colonoscopy; Future    7  Nodule of lower lobe of right lung  Assessment & Plan:    Patient is scheduled for an upcoming surveillance CT of the chest   She continues to follow with cardiothoracic surgery  2/8/2022  CT CHEST WITHOUT IV CONTRAST     INDICATION:   R91 1: Solitary pulmonary nodule      COMPARISON:  Chest CT February 12, 2021  Correlation with chest CT November 3, 2020 and PET CT November 16, 2020      TECHNIQUE: CT examination of the chest was performed without intravenous contrast   Axial, sagittal, and coronal 2D reformatted images were created from the source data and submitted for interpretation      Radiation dose length product (DLP) for this visit:  122 mGy-cm   This examination, like all CT scans performed in the [facility], was performed utilizing techniques to minimize radiation dose exposure, including the use of iterative   reconstruction and automated exposure control       FINDINGS:     LUNGS:  Smoothly marginated, noncalcified 1 3 x 1 2 cm right lower lobe nodule is unchanged from index study of November 3, 2020 (series 202, image 101)    No intralesional lipid is identified      Mild centrilobular and paraseptal emphysema with no significant change in multiple tiny nodules throughout the lungs, which are again most conspicuous on the coronal maximum intensity projection images      0 4 cm filling defect along the posterior wall of proximal trachea is unchanged from index study of November 3, 2020 (series 205, image 10)     A tiny right paratracheal diverticulum is again noted (series 205, image 16)      PLEURA:  Unremarkable      HEART/GREAT VESSELS: Normal heart size  Mild degree of coronary artery calcifications and atherosclerotic calcifications of the aorta  Aberrant right subclavian artery, a normal anatomic variant  No thoracic aortic aneurysm      MEDIASTINUM AND ERIN:  Unremarkable      CHEST WALL AND LOWER NECK:   Unremarkable      VISUALIZED STRUCTURES IN THE UPPER ABDOMEN:  1 6 cm calculus in the right renal pelvis  Cholecystectomy clips     OSSEOUS STRUCTURES:  Spinal degenerative changes are noted  No acute fracture or destructive osseous lesion      IMPRESSION:     1  The 1 3 x 1 2 cm smoothly marginated right lower lobe nodule is unchanged from index study of November 3, 2020  Continued surveillance is advised  2   Subcentimeter nodularity along the posterior wall of proximal trachea is unchanged from index study of November 3, 2020          8  Primary hypertension  Assessment & Plan:    The patient's blood pressure in the office today is 140/74  The patient continues on her antihypertensive medications  Low-salt diet recommended  Weight loss recommended  9  Bilateral carpal tunnel syndrome  Assessment & Plan:    The patient is scheduled for right carpal tunnel surgery on July 14th  She has been provided information by her surgeon regarding medication management prior to surgery  10  Stage 2 chronic kidney disease  Assessment & Plan:  Lab Results   Component Value Date    EGFR 66 06/09/2022    EGFR 87 02/07/2022    EGFR 86 11/05/2021    CREATININE 0 90 06/09/2022    CREATININE 0 72 02/07/2022    CREATININE 0 73 11/05/2021     Recent GFR normal   The patient is due for a repeat  This will be performed today  11  Mixed hyperlipidemia  Assessment & Plan:    The patient remains on her Lipitor and fenofibrate  She continues to smoke    Lifestyle modifications discussed  Low-fat low-cholesterol diet recommended  Component      Latest Ref Rng & Units 2/7/2022   Cholesterol      See Comment mg/dL 153   Triglycerides      See Comment mg/dL 205 (H)   HDL      >=50 mg/dL 42 (L)   LDL Calculated      0 - 100 mg/dL 70         12  Leukocytosis, unspecified type  Assessment & Plan:   The patient with chronically elevated WBC count  The patient continues to smoke  She does have emphysema  I believe this elevation is in her white cell count is due to the above  We will continue to monitor  Assessment of Cardiac Risk:  Denies unstable or severe angina or MI in the last 6 weeks or history of stent placement in the last year   Denies decompensated heart failure (e g  New onset heart failure, NYHA functional class IV heart failure, or worsening existing heart failure)  Denies significant arrhythmias such as high grade AV block, symptomatic ventricular arrhythmia, newly recognized ventricular tachycardia, supraventricular tachycardia with resting heart rate >100, or symptomatic bradycardia  Denies severe heart valve disease including aortic stenosis or symptomatic mitral stenosis     Exercise Capacity:  Able to walk 4 blocks without symptoms?: Yes  Able to walk 2 flights without symptoms?: Yes    Prior Anesthesia Reactions: No     Personal history of venous thromboembolic disease? No    History of steroid use for >2 weeks within last year? No       Review of Systems:     Review of Systems   Constitutional: Negative for activity change, fatigue and fever  HENT: Negative for congestion, hearing loss, rhinorrhea, trouble swallowing and voice change  Eyes: Negative for photophobia, pain, discharge and visual disturbance  Respiratory: Negative for cough, chest tightness and shortness of breath  Cardiovascular: Negative for chest pain, palpitations and leg swelling     Gastrointestinal: Negative for abdominal pain, blood in stool, constipation, nausea and vomiting  Endocrine: Negative for cold intolerance and heat intolerance  Genitourinary: Negative for difficulty urinating, frequency, hematuria, urgency, vaginal bleeding and vaginal discharge  Musculoskeletal: Negative for arthralgias and myalgias  Skin: Negative  Neurological: Negative for dizziness, weakness, numbness and headaches  Psychiatric/Behavioral: Negative for decreased concentration  The patient is not nervous/anxious  Problem List:     Patient Active Problem List   Diagnosis    Depression    Hyperlipidemia    Hypertension    Hypokalemia    Arthritis    BMI 32 0-32 9,adult    Smoking addiction    Adenoma of cecum    Nodule of lower lobe of right lung    Stage 2 chronic kidney disease    Tinea    Fatigue    Inclusion cyst of vulva    Skin yeast infection    Bilateral carpal tunnel syndrome    Primary osteoarthritis of both knees    [facility]    Current mild episode of major depressive disorder, unspecified whether recurrent ([facility] )    Atherosclerosis of aorta ([facility])    Stage 3a chronic kidney disease ([facility] )    Type 2 diabetes mellitus without complication, without long-term current use of insulin ([facility])    Leukocytosis        Allergies:      Allergies   Allergen Reactions    Ciprofloxacin Rash    Codeine Rash        Current Medications:       Current Outpatient Medications:     ASPIRIN 81 PO, Take 81 mg by mouth daily , Disp: , Rfl:     atorvastatin (LIPITOR) 10 mg tablet, Take 1 tablet (10 mg total) by mouth daily, Disp: 90 tablet, Rfl: 3    cephalexin (KEFLEX) 250 mg capsule, cephalexin 250 mg capsule, Disp: , Rfl:     colestipol (COLESTID) 1 g tablet, Take 2 tablets (2 g total) by mouth 3 (three) times a day, Disp: 180 tablet, Rfl: 0    cycloSPORINE (RESTASIS) 0 05 % ophthalmic emulsion, Restasis 0 05 % eye drops in a dropperette, Disp: , Rfl:     ergocalciferol (VITAMIN D2) 50,000 units, Take 1 capsule (50,000 Units total) by mouth once a week for 12 doses, Disp: 12 capsule, Rfl: 0    fenofibrate 160 MG tablet, Take 1 tablet (160 mg total) by mouth daily, Disp: 90 tablet, Rfl: 3    furosemide (LASIX) 20 mg tablet, Take 1 tablet (20 mg total) by mouth daily, Disp: 90 tablet, Rfl: 3    glucose blood (OneTouch Verio) test strip, Pt to test tid dx;73 03 pt  Requesting verio reflex test strips  , Disp: 300 strip, Rfl: 3    glucose blood (OneTouch Verio) test strip, Pt to test blood sugar  Daily, Disp: 100 strip, Rfl: 3    ketoconazole (NIZORAL) 2 % cream, Apply topically daily, Disp: 15 g, Rfl: 0    loperamide (IMODIUM) 2 mg capsule, Take 1 capsule (2 mg total) by mouth 2 (two) times a day with meals, Disp: 60 capsule, Rfl: 1    Loratadine 10 MG CAPS, as needed , Disp: , Rfl:     meloxicam (Mobic) 15 mg tablet, Take 1 tablet (15 mg total) by mouth every other day, Disp: 30 tablet, Rfl: 3    metFORMIN (GLUCOPHAGE) 500 mg tablet, Take 1 tablet (500 mg total) by mouth 2 (two) times a day with meals, Disp: 180 tablet, Rfl: 0    metoprolol succinate (TOPROL-XL) 25 mg 24 hr tablet, Take 1 tablet (25 mg total) by mouth daily, Disp: 90 tablet, Rfl: 3    Misc Natural Products (OSTEO BI-FLEX ADV DOUBLE ST PO), Take by mouth, Disp: , Rfl:     nystatin (MYCOSTATIN) powder, Apply topically 2 (two) times a day as needed (itching), Disp: 15 g, Rfl: 0    omeprazole (PriLOSEC) 20 mg delayed release capsule, Take 1 capsule (20 mg total) by mouth daily, Disp: 90 capsule, Rfl: 3    OneTouch Delica Lancets 29H MISC, Check blood glucose daily, Disp: 100 each, Rfl: 3    Potassium Chloride ER 20 MEQ TBCR, Take 1 tablet (20 mEq total) by mouth in the morning, Disp: 90 tablet, Rfl: 3    valsartan (DIOVAN) 320 MG tablet, Take 1 tablet (320 mg total) by mouth daily, Disp: 90 tablet, Rfl: 3     Past History:     Past Medical History:   Diagnosis Date    Depression     Hyperlipidemia     Hypertension     Nodule of lower lobe of right lung         Past Surgical History:   Procedure Laterality Date    CHOLECYSTECTOMY      COLONOSCOPY      HEMICOLOECTOMY W/ ANASTOMOSIS N/A 12/14/2020    Procedure: LAPAROSCOPIC HAND-ASSISTED RIGHT HEMICOLECTOMY;  Surgeon: Eliezer Freitas MD;  Location: [facility];  Service: Surgical Oncology    HYSTERECTOMY      partial        Family History   Problem Relation Age of Onset    Throat cancer Mother     Heart disease Father         Social History     Socioeconomic History    Marital status:      Spouse name: Not on file    Number of children: Not on file    Years of education: Not on file    Highest education level: Not on file   Occupational History    Not on file   Tobacco Use    Smoking status: Current Every Day Smoker     Packs/day: 0 25     Years: 52 00     Pack years: 13 00     Types: Cigarettes    Smokeless tobacco: Never Used   Vaping Use    Vaping Use: Never used   Substance and Sexual Activity    Alcohol use: Yes     Alcohol/week: 1 0 standard drink     Types: 1 Glasses of wine per week     Comment: on the weekend     Drug use: Never    Sexual activity: Not Currently   Other Topics Concern    Not on file   Social History Narrative    Not on file     Social Determinants of Health     Financial Resource Strain: Not on file   Food Insecurity: Not on file   Transportation Needs: Not on file   Physical Activity: Insufficiently Active    Days of Exercise per Week: 3 days    Minutes of Exercise per Session: 30 min   Stress: No Stress Concern Present    Feeling of Stress : Not at all   Social Connections: Not on file   Intimate Partner Violence: Not on file   Housing Stability: Not on file        Physical Exam:      /74 (BP Location: Left arm, Patient Position: Sitting, Cuff Size: Large)   Pulse 79   Temp 97 8 °F (36 6 °C) (Temporal)   Resp 17   Ht 5' 1" (1 549 m)   Wt 80 3 kg (177 lb)   SpO2 95%   BMI 33 44 kg/m²     Physical Exam  Constitutional:       General: She is not in acute distress  Appearance: Normal appearance  She is well-developed  She is obese  HENT:      Head: Normocephalic and atraumatic  Right Ear: Tympanic membrane, ear canal and external ear normal  There is no impacted cerumen  Left Ear: Tympanic membrane, ear canal and external ear normal  There is no impacted cerumen  Nose: Nose normal  No congestion or rhinorrhea  Mouth/Throat:      Mouth: Mucous membranes are moist       Pharynx: Oropharynx is clear  No oropharyngeal exudate or posterior oropharyngeal erythema  Eyes:      General:         Right eye: No discharge  Left eye: No discharge  Conjunctiva/sclera: Conjunctivae normal       Pupils: Pupils are equal, round, and reactive to light  Neck:      Thyroid: No thyromegaly  Cardiovascular:      Rate and Rhythm: Normal rate and regular rhythm  Pulses: Normal pulses  Heart sounds: Normal heart sounds  No murmur heard  Pulmonary:      Effort: Pulmonary effort is normal  No respiratory distress  Breath sounds: Normal breath sounds  Abdominal:      General: Bowel sounds are normal  There is no distension  Palpations: Abdomen is soft  There is no mass  Tenderness: There is no abdominal tenderness  There is no guarding or rebound  Hernia: No hernia is present  Musculoskeletal:         General: Normal range of motion  Cervical back: Normal range of motion  Lymphadenopathy:      Cervical: No cervical adenopathy  Skin:     General: Skin is warm and dry  Capillary Refill: Capillary refill takes less than 2 seconds  Neurological:      General: No focal deficit present  Mental Status: She is alert and oriented to person, place, and time  Psychiatric:         Mood and Affect: Mood normal          Behavior: Behavior normal          Thought Content:  Thought content normal          Judgment: Judgment normal            Data:     Pre-operative work-up    Laboratory Results: I have personally reviewed the pertinent laboratory results/reports     EKG: I have personally reviewed pertinent reports  Chest x-ray: patient will have this done today following appointment       Assessment:     1  Pre-op evaluation  POCT ECG   2  Type 2 diabetes mellitus without complication, without long-term current use of insulin (HCC)  Hemoglobin A1C    metFORMIN (GLUCOPHAGE) 500 mg tablet    CANCELED: IRIS Diabetic eye exam   3  Centrilobular emphysema ([facility] )     4  Current mild episode of major depressive disorder, unspecified whether recurrent ([facility] )     5  Atherosclerosis of aorta ([facility] )     6  Colon cancer screening  Ambulatory referral for colonoscopy   7  Nodule of lower lobe of right lung     8  Primary hypertension     9  Bilateral carpal tunnel syndrome     10  Stage 2 chronic kidney disease     11  Mixed hyperlipidemia     12  Leukocytosis, unspecified type          Plan:     77 y o  female with planned surgery: right carpal tunnel release surgery  1  Further preoperative workup as follows:   - Chest x-ray  - blood work today following appointment     2  Medication Management/Recommendations:   - Patient has been instructed to avoid herbs or non-directed vitamins the week prior to surgery to ensure no drug interactions with perioperative surgical and anesthetic medications  - Patient has been instructed to avoid aspirin containing medications or non-steroidal anti-inflammatory drugs for the week preceding surgery   -surgeon office provided patient with pre op medication instructions     3  Prophylaxis for cardiac events with perioperative beta-blockers: not indicated  4  Patient requires further consultation with: None    Clearance  Patient is CLEARED for surgery without any additional cardiac testing       [facility] [address] [address]  Phone#  [phone]  Fax#  [phone]

## 2022-06-20 NOTE — TELEPHONE ENCOUNTER
Message for Ally Haque    Pt called back where she gets her eyes examined      [facility]    [phone]

## 2022-06-20 NOTE — ASSESSMENT & PLAN NOTE
The patient is scheduled for right carpal tunnel surgery on July 14th  She has been provided information by her surgeon regarding medication management prior to surgery

## 2022-06-20 NOTE — PROGRESS NOTES
[facility] - [facility] OF [address] [place] [address]    NAME: Chetna Sandhu  AGE: 77 y o  SEX: female  : 1955     DATE: 2022     Assessment and Plan:     Problem List Items Addressed This Visit        Endocrine    Type 2 diabetes mellitus without complication, without long-term current use of insulin ([facility] [address] )       Lab Results   Component Value Date    HGBA1C 6 5 (H) 2022     The patient's most recent hemoglobin A1c is 6 5  The patient had been diagnosed with diabetes in the past but has been off medication for some time with normal hemoglobin A1c levels  She does have family history of diabetes in her sister  Treatment regimens were discussed  The patient has been on metformin in the past   Metformin 500 mg twice daily was sent to her pharmacy  Information was provided to the patient in the form of a booklet regarding carbohydrate counting  Diabetic foot exam was performed in the office today  Patient had an eye exam recently we will contact her ophthalmologist for those results  Dietary modifications and weight loss recommended           Relevant Medications    metFORMIN (GLUCOPHAGE) 500 mg tablet    Other Relevant Orders    Hemoglobin A1C       Respiratory    Centrilobular emphysema (HCC)       Well controlled at present  The patient continues to smoke approximately a half pack of cigarettes per day  smoking cessation recommended  Cardiovascular and Mediastinum    Hypertension       The patient's blood pressure in the office today is 140/74  The patient continues on her antihypertensive medications  Low-salt diet recommended  Weight loss recommended  Atherosclerosis of aorta (HCC)       Nervous and Auditory    Bilateral carpal tunnel syndrome       The patient is scheduled for right carpal tunnel surgery on   She has been provided information by her surgeon regarding medication management prior to surgery                Genitourinary Stage 2 chronic kidney disease     Lab Results   Component Value Date    EGFR 66 06/09/2022    EGFR 87 02/07/2022    EGFR 86 11/05/2021    CREATININE 0 90 06/09/2022    CREATININE 0 72 02/07/2022    CREATININE 0 73 11/05/2021     Recent GFR normal   The patient is due for a repeat  This will be performed today  Other    Hyperlipidemia       The patient remains on her Lipitor and fenofibrate  She continues to smoke  Lifestyle modifications discussed  Low-fat low-cholesterol diet recommended  Component      Latest Ref Rng & Units 2/7/2022   Cholesterol      See Comment mg/dL 153   Triglycerides      See Comment mg/dL 205 (H)   HDL      >=50 mg/dL 42 (L)   LDL Calculated      0 - 100 mg/dL 70              Nodule of lower lobe of right lung       Patient is scheduled for an upcoming surveillance CT of the chest   She continues to follow with cardiothoracic surgery  2/8/2022  CT CHEST WITHOUT IV CONTRAST     INDICATION:   R91 1: Solitary pulmonary nodule      COMPARISON:  Chest CT February 12, 2021  Correlation with chest CT November 3, 2020 and PET CT November 16, 2020      TECHNIQUE: CT examination of the chest was performed without intravenous contrast   Axial, sagittal, and coronal 2D reformatted images were created from the source data and submitted for interpretation      Radiation dose length product (DLP) for this visit:  122 mGy-cm   This examination, like all CT scans performed in the [facility], was performed utilizing techniques to minimize radiation dose exposure, including the use of iterative   reconstruction and automated exposure control       FINDINGS:     LUNGS:  Smoothly marginated, noncalcified 1 3 x 1 2 cm right lower lobe nodule is unchanged from index study of November 3, 2020 (series 202, image 101)    No intralesional lipid is identified      Mild centrilobular and paraseptal emphysema with no significant change in multiple tiny nodules throughout the lungs, which are again most conspicuous on the coronal maximum intensity projection images      0 4 cm filling defect along the posterior wall of proximal trachea is unchanged from index study of November 3, 2020 (series 205, image 10)     A tiny right paratracheal diverticulum is again noted (series 205, image 16)      PLEURA:  Unremarkable      HEART/GREAT VESSELS: Normal heart size  Mild degree of coronary artery calcifications and atherosclerotic calcifications of the aorta  Aberrant right subclavian artery, a normal anatomic variant  No thoracic aortic aneurysm      MEDIASTINUM AND ERIN:  Unremarkable      CHEST WALL AND LOWER NECK:   Unremarkable      VISUALIZED STRUCTURES IN THE UPPER ABDOMEN:  1 6 cm calculus in the right renal pelvis  Cholecystectomy clips     OSSEOUS STRUCTURES:  Spinal degenerative changes are noted  No acute fracture or destructive osseous lesion      IMPRESSION:     1  The 1 3 x 1 2 cm smoothly marginated right lower lobe nodule is unchanged from index study of November 3, 2020  Continued surveillance is advised  2   Subcentimeter nodularity along the posterior wall of proximal trachea is unchanged from index study of November 3, 2020               Current mild episode of major depressive disorder, unspecified whether recurrent ([address] )       Stable with current medication           Leukocytosis      The patient with chronically elevated WBC count  The patient continues to smoke  She does have emphysema  I believe this elevation is in her white cell count is due to the above  We will continue to monitor  Other Visit Diagnoses     Pre-op evaluation    -  Primary    Relevant Orders    POCT ECG (Completed)    Colon cancer screening        Relevant Orders    Ambulatory referral for colonoscopy              Return in about 3 months (around 9/27/2022) for Dr Justin Pichardo       Chief Complaint:     Chief Complaint   Patient presents with    Follow-up     WBC high    Pre-op Exam     Surgery 07/14        History of Present Illness:       Patient presents to the office today for follow-up  Recent blood work was reviewed  Patient's hemoglobin A1c is 6 5 which gives her diagnosis of diabetes  She had been diabetic in the past and was on  Metformin  This will be restarted today  Diabetic foot exam was performed in the office today  She recently had a full eye exam and I will obtain those results  Patient will see a provider back in the office in 3 months with repeat blood work at that time     Review of Systems:     Review of Systems   Constitutional: Negative for activity change, fatigue and fever  HENT: Negative for congestion, hearing loss, rhinorrhea, trouble swallowing and voice change  Eyes: Negative for photophobia, pain, discharge and visual disturbance  Respiratory: Negative for cough, chest tightness and shortness of breath  Cardiovascular: Negative for chest pain, palpitations and leg swelling  Gastrointestinal: Negative for abdominal pain, blood in stool, constipation, nausea and vomiting  Endocrine: Negative for cold intolerance and heat intolerance  Genitourinary: Negative for difficulty urinating, frequency, hematuria, urgency, vaginal bleeding and vaginal discharge  Musculoskeletal: Negative for arthralgias and myalgias  Skin: Negative  Neurological: Negative for dizziness, weakness, numbness and headaches  Psychiatric/Behavioral: Negative for decreased concentration  The patient is not nervous/anxious           Problem List:     Patient Active Problem List   Diagnosis    Depression    Hyperlipidemia    Hypertension    Hypokalemia    Arthritis    BMI 32 0-32 9,adult    Smoking addiction    Adenoma of cecum    Nodule of lower lobe of right lung    Stage 2 chronic kidney disease    Tinea    Fatigue    Inclusion cyst of vulva    Skin yeast infection    Bilateral carpal tunnel syndrome    Primary osteoarthritis of both knees    Centrilobular emphysema (HCC)    Current mild episode of major depressive disorder, unspecified whether recurrent ([facility] [address] )    Atherosclerosis of aorta (HCC)    [facility]    Type 2 diabetes mellitus without complication, without long-term current use of insulin (HCC)    Leukocytosis        Objective:     /74 (BP Location: Left arm, Patient Position: Sitting, Cuff Size: Large)   Pulse 79   Temp 97 8 °F (36 6 °C) (Temporal)   Resp 17   Ht 5' 1" (1 549 m)   Wt 80 3 kg (177 lb)   SpO2 95%   BMI 33 44 kg/m²       Current Outpatient Medications   Medication Instructions    ASPIRIN 81 PO 81 mg, Oral, Daily    atorvastatin (LIPITOR) 10 mg, Oral, Daily    cephalexin (KEFLEX) 250 mg capsule cephalexin 250 mg capsule    colestipol (COLESTID) 2 g, Oral, 3 times daily    cycloSPORINE (RESTASIS) 0 05 % ophthalmic emulsion Restasis 0 05 % eye drops in a dropperette    ergocalciferol (VITAMIN D2) 50,000 Units, Oral, Weekly    fenofibrate 160 mg, Oral, Daily    furosemide (LASIX) 20 mg, Oral, Daily    glucose blood (OneTouch Verio) test strip Pt to test tid dx;73 03 pt  Requesting verio reflex test strips   glucose blood (OneTouch Verio) test strip Pt to test blood sugar  Daily    ketoconazole (NIZORAL) 2 % cream Topical, Daily    loperamide (IMODIUM) 2 mg, Oral, 2 times daily with meals    Loratadine 10 MG CAPS As needed    meloxicam (MOBIC) 15 mg, Oral, Every 48 hours    metFORMIN (GLUCOPHAGE) 500 mg, Oral, 2 times daily with meals    metoprolol succinate (TOPROL-XL) 25 mg, Oral, Daily    Misc Natural Products (OSTEO BI-FLEX ADV DOUBLE ST PO) Oral    nystatin (MYCOSTATIN) powder Topical, 2 times daily PRN    omeprazole (PRILOSEC) 20 mg, Oral, Daily    OneTouch Delica Lancets 37F MISC Check blood glucose daily    Potassium Chloride ER 20 MEQ TBCR 20 mEq, Oral, Daily    valsartan (DIOVAN) 320 mg, Oral, Daily       Physical Exam  Vitals reviewed     Constitutional: Appearance: Normal appearance  She is obese  HENT:      Head: Normocephalic  Nose: Nose normal       Mouth/Throat:      Mouth: Mucous membranes are moist       Pharynx: Oropharynx is clear  Eyes:      Extraocular Movements: Extraocular movements intact  Pupils: Pupils are equal, round, and reactive to light  Cardiovascular:      Rate and Rhythm: Normal rate and regular rhythm  Pulses: Normal pulses  no weak pulses          Dorsalis pedis pulses are 2+ on the right side and 2+ on the left side  Posterior tibial pulses are 2+ on the right side and 2+ on the left side  Heart sounds: Normal heart sounds  Pulmonary:      Effort: Pulmonary effort is normal       Breath sounds: Normal breath sounds  Musculoskeletal:         General: Normal range of motion  Feet:      Right foot:      Skin integrity: No ulcer, skin breakdown, erythema, warmth, callus or dry skin  Left foot:      Skin integrity: No ulcer, skin breakdown, erythema, warmth, callus or dry skin  Skin:     General: Skin is warm and dry  Neurological:      General: No focal deficit present  Mental Status: She is alert and oriented to person, place, and time  Psychiatric:         Mood and Affect: Mood normal          Behavior: Behavior normal          Thought Content: Thought content normal          Judgment: Judgment normal             Diabetic Foot Exam    Patient's shoes and socks removed  Right Foot/Ankle   Right Foot Inspection  Skin Exam: skin normal and skin intact  No dry skin, no warmth, no callus, no erythema, no maceration, no abnormal color, no pre-ulcer, no ulcer and no callus  Toe Exam: ROM and strength within normal limits  Sensory   Vibration: intact  Proprioception: intact  Monofilament testing: intact    Vascular  Capillary refills: < 3 seconds  The right DP pulse is 2+  The right PT pulse is 2+       Right Toe  - Comprehensive Exam  Ecchymosis: none  Arch: normal  Hammertoes: absent  Claw Toes: absent  Swelling: none   Tenderness: none         Left Foot/Ankle  Left Foot Inspection  Skin Exam: skin normal and skin intact  No dry skin, no warmth, no erythema, no maceration, normal color, no pre-ulcer, no ulcer and no callus  Toe Exam: ROM and strength within normal limits  Sensory   Vibration: intact  Proprioception: intact  Monofilament testing: intact    Vascular  Capillary refills: < 3 seconds  The left DP pulse is 2+  The left PT pulse is 2+       Left Toe  - Comprehensive Exam  Ecchymosis: none  Arch: normal  Hammertoes: absent  Claw toes: absent  Swelling: none   Tenderness: none           Assign Risk Category  No deformity present  No loss of protective sensation  No weak pulses  Risk: 0    Simon Espinosa, [address] [place] [address]

## 2022-06-23 ENCOUNTER — HOSPITAL ENCOUNTER (OUTPATIENT)
Dept: RADIOLOGY | Facility: HOSPITAL | Age: 67
Discharge: HOME | End: 2022-06-23
Payer: COMMERCIAL

## 2022-06-23 DIAGNOSIS — Z01.89 ENCOUNTER FOR OTHER SPECIFIED SPECIAL EXAMINATIONS: ICD-10-CM

## 2022-06-23 PROCEDURE — 71046 X-RAY EXAM CHEST 2 VIEWS: CPT

## 2022-07-30 ENCOUNTER — NURSE TRIAGE (OUTPATIENT)
Dept: OTHER | Facility: OTHER | Age: 67
End: 2022-07-30

## 2022-07-30 NOTE — TELEPHONE ENCOUNTER
Reason for Disposition   SEVERE rectal bleeding (large blood clots; on and off, or constant bleeding)    Answer Assessment - Initial Assessment Questions  1  APPEARANCE of BLOOD: "What color is it?" "Is it passed separately, on the surface of the stool, or mixed in with the stool?"       Bright red liquid, majority blood with small amount of stool  2  AMOUNT: "How much blood was passed?"       Large amount, turned toilet red    3  FREQUENCY: "How many times has blood been passed with the stools?"       2    4  ONSET: "When was the blood first seen in the stools?" (Days or weeks)       7/30 at 1200    5  DIARRHEA: "Is there also some diarrhea?" If Yes, ask: "How many diarrhea stools were passed in past 24 hours?"       Small amount of stool mixed with blood  States ongoing issue with diarrhea but never had blood before    6  CONSTIPATION: "Do you have constipation?" If Yes, ask: "How bad is it?"      Denies    7  RECURRENT SYMPTOMS: "Have you had blood in your stools before?" If Yes, ask: "When was the last time?" and "What happened that time?"       Denies    8  BLOOD THINNERS: "Do you take any blood thinners?" (e g , Coumadin/warfarin, Pradaxa/dabigatran, aspirin)      81 mg ASA    9  OTHER SYMPTOMS: "Do you have any other symptoms?"  (e g , abdominal pain, vomiting, dizziness, fever)      Denies    10   PREGNANCY: "Is there any chance you are pregnant?" "When was your last menstrual period?"        n/a    Protocols used: RECTAL BLEEDING-ADULT-

## 2022-07-30 NOTE — TELEPHONE ENCOUNTER
Regarding: Carpil tunnel surgery yesterday, diarrhea with blood   ----- Message from Gabriel Lai sent at 7/30/2022  1:50 PM EDT -----  " I had surgery for carpil tunnel yesterday   I have gone to the bathroom twice and I have watery diarrhea with blood coming out of my bottom "

## 2022-11-16 ENCOUNTER — TELEPHONE (OUTPATIENT)
Dept: GASTROENTEROLOGY | Facility: CLINIC | Age: 67
End: 2022-11-16

## 2022-11-16 DIAGNOSIS — R19.7 DIARRHEA, UNSPECIFIED TYPE: Primary | ICD-10-CM

## 2022-11-16 NOTE — TELEPHONE ENCOUNTER
Spoke with patient  History of IBS-D, colon polyps    Patient c/o continued diarrhea episodes  Previously on colestipol 2G BID which she stopped 1 month ago  Patients last OV was May of 2021  Patient will follow-up with repeat stool testing, and restart the colestipol at her last ordered dose 2G TID  She will follow-up in OV 12/19/2022  Patient understands not to use imodium until stool cultures are resulted

## 2022-11-16 NOTE — TELEPHONE ENCOUNTER
I spoke with patient she is still experiencing the diarrhea  Patient stated that the medication, Colestipol was not working  Patient stopped taking the medication about a month ago  Patient scheduled OV for 12/19/22  Is there anything we can recommend for diarrhea in the meantime   Please call Chetna at [phone] ty

## 2022-11-23 DIAGNOSIS — R19.7 DIARRHEA, UNSPECIFIED TYPE: ICD-10-CM

## 2022-11-23 RX ORDER — MONTELUKAST SODIUM 4 MG/1
2 TABLET, CHEWABLE ORAL 3 TIMES DAILY
Qty: 180 TABLET | Refills: 0 | Status: CANCELLED | OUTPATIENT
Start: 2022-11-23 | End: 2022-12-23

## 2022-11-25 ENCOUNTER — TELEPHONE (OUTPATIENT)
Dept: GASTROENTEROLOGY | Facility: CLINIC | Age: 67
End: 2022-11-25

## 2022-11-25 DIAGNOSIS — R19.7 DIARRHEA, UNSPECIFIED TYPE: ICD-10-CM

## 2022-11-25 RX ORDER — MONTELUKAST SODIUM 4 MG/1
2 TABLET, CHEWABLE ORAL 3 TIMES DAILY
Qty: 540 TABLET | Refills: 0 | Status: SHIPPED | OUTPATIENT
Start: 2022-11-25 | End: 2023-02-23

## 2022-11-25 NOTE — TELEPHONE ENCOUNTER
Patients GI provider: Nadege MARTINEZ     Number to return call: [phone]    Reason for call: Pt calling to request refill for colestipol (COLESTID) 1 g tablet   Please send new script to [facility]    Scheduled procedure/appointment date if applicable: APPT 40/37/72

## 2022-12-12 ENCOUNTER — APPOINTMENT (OUTPATIENT)
Dept: LAB | Facility: CLINIC | Age: 67
End: 2022-12-12

## 2022-12-12 ENCOUNTER — OFFICE VISIT (OUTPATIENT)
Dept: INTERNAL MEDICINE CLINIC | Facility: CLINIC | Age: 67
End: 2022-12-12

## 2022-12-12 VITALS
OXYGEN SATURATION: 99 % | WEIGHT: 173 LBS | DIASTOLIC BLOOD PRESSURE: 86 MMHG | HEART RATE: 77 BPM | BODY MASS INDEX: 32.66 KG/M2 | SYSTOLIC BLOOD PRESSURE: 150 MMHG | HEIGHT: 61 IN

## 2022-12-12 DIAGNOSIS — I10 PRIMARY HYPERTENSION: ICD-10-CM

## 2022-12-12 DIAGNOSIS — E78.2 MIXED HYPERLIPIDEMIA: ICD-10-CM

## 2022-12-12 DIAGNOSIS — J43.2 CENTRILOBULAR EMPHYSEMA (HCC): ICD-10-CM

## 2022-12-12 DIAGNOSIS — E11.9 TYPE 2 DIABETES MELLITUS WITHOUT COMPLICATION, WITHOUT LONG-TERM CURRENT USE OF INSULIN (HCC): Primary | ICD-10-CM

## 2022-12-12 DIAGNOSIS — B35.9 TINEA: ICD-10-CM

## 2022-12-12 DIAGNOSIS — I10 ESSENTIAL HYPERTENSION: ICD-10-CM

## 2022-12-12 DIAGNOSIS — E11.9 TYPE 2 DIABETES MELLITUS WITHOUT COMPLICATION, WITHOUT LONG-TERM CURRENT USE OF INSULIN (HCC): ICD-10-CM

## 2022-12-12 DIAGNOSIS — R73.09 ELEVATED RANDOM BLOOD GLUCOSE LEVEL: ICD-10-CM

## 2022-12-12 DIAGNOSIS — Z23 ENCOUNTER FOR IMMUNIZATION: ICD-10-CM

## 2022-12-12 DIAGNOSIS — Z13.0 SCREENING FOR IRON DEFICIENCY ANEMIA: ICD-10-CM

## 2022-12-12 DIAGNOSIS — Z12.11 ENCOUNTER FOR SCREENING FOR MALIGNANT NEOPLASM OF COLON: ICD-10-CM

## 2022-12-12 DIAGNOSIS — R12 HEARTBURN: ICD-10-CM

## 2022-12-12 DIAGNOSIS — N18.31 STAGE 3A CHRONIC KIDNEY DISEASE (HCC): ICD-10-CM

## 2022-12-12 DIAGNOSIS — Z12.31 ENCOUNTER FOR SCREENING MAMMOGRAM FOR BREAST CANCER: ICD-10-CM

## 2022-12-12 LAB
ALBUMIN SERPL BCP-MCNC: 3.8 G/DL (ref 3.5–5)
ALP SERPL-CCNC: 62 U/L (ref 46–116)
ALT SERPL W P-5'-P-CCNC: 17 U/L (ref 12–78)
ANION GAP SERPL CALCULATED.3IONS-SCNC: 5 MMOL/L (ref 4–13)
AST SERPL W P-5'-P-CCNC: 16 U/L (ref 5–45)
BASOPHILS # BLD AUTO: 0.07 THOUSANDS/ÂΜL (ref 0–0.1)
BASOPHILS NFR BLD AUTO: 1 % (ref 0–1)
BILIRUB SERPL-MCNC: 0.54 MG/DL (ref 0.2–1)
BUN SERPL-MCNC: 14 MG/DL (ref 5–25)
CALCIUM SERPL-MCNC: 9.4 MG/DL (ref 8.3–10.1)
CHLORIDE SERPL-SCNC: 111 MMOL/L (ref 96–108)
CHOLEST SERPL-MCNC: 116 MG/DL
CO2 SERPL-SCNC: 25 MMOL/L (ref 21–32)
CREAT SERPL-MCNC: 0.89 MG/DL (ref 0.6–1.3)
EOSINOPHIL # BLD AUTO: 0.31 THOUSAND/ÂΜL (ref 0–0.61)
EOSINOPHIL NFR BLD AUTO: 3 % (ref 0–6)
ERYTHROCYTE [DISTWIDTH] IN BLOOD BY AUTOMATED COUNT: 13.2 % (ref 11.6–15.1)
GFR SERPL CREATININE-BSD FRML MDRD: 67 ML/MIN/1.73SQ M
GLUCOSE P FAST SERPL-MCNC: 107 MG/DL (ref 65–99)
HCT VFR BLD AUTO: 38.6 % (ref 34.8–46.1)
HDLC SERPL-MCNC: 40 MG/DL
HGB BLD-MCNC: 12.5 G/DL (ref 11.5–15.4)
IMM GRANULOCYTES # BLD AUTO: 0.07 THOUSAND/UL (ref 0–0.2)
IMM GRANULOCYTES NFR BLD AUTO: 1 % (ref 0–2)
LDLC SERPL CALC-MCNC: 36 MG/DL (ref 0–100)
LEFT EYE DIABETIC RETINOPATHY: NORMAL
LEFT EYE IMAGE QUALITY: NORMAL
LEFT EYE MACULAR EDEMA: NORMAL
LEFT EYE OTHER RETINOPATHY: NORMAL
LYMPHOCYTES # BLD AUTO: 2.97 THOUSANDS/ÂΜL (ref 0.6–4.47)
LYMPHOCYTES NFR BLD AUTO: 26 % (ref 14–44)
MCH RBC QN AUTO: 30.5 PG (ref 26.8–34.3)
MCHC RBC AUTO-ENTMCNC: 32.4 G/DL (ref 31.4–37.4)
MCV RBC AUTO: 94 FL (ref 82–98)
MONOCYTES # BLD AUTO: 0.86 THOUSAND/ÂΜL (ref 0.17–1.22)
MONOCYTES NFR BLD AUTO: 8 % (ref 4–12)
NEUTROPHILS # BLD AUTO: 7.24 THOUSANDS/ÂΜL (ref 1.85–7.62)
NEUTS SEG NFR BLD AUTO: 61 % (ref 43–75)
NONHDLC SERPL-MCNC: 76 MG/DL
NRBC BLD AUTO-RTO: 0 /100 WBCS
PLATELET # BLD AUTO: 264 THOUSANDS/UL (ref 149–390)
PMV BLD AUTO: 11 FL (ref 8.9–12.7)
POTASSIUM SERPL-SCNC: 4.1 MMOL/L (ref 3.5–5.3)
PROT SERPL-MCNC: 7.3 G/DL (ref 6.4–8.4)
RBC # BLD AUTO: 4.1 MILLION/UL (ref 3.81–5.12)
RIGHT EYE DIABETIC RETINOPATHY: NORMAL
RIGHT EYE IMAGE QUALITY: NORMAL
RIGHT EYE MACULAR EDEMA: NORMAL
RIGHT EYE OTHER RETINOPATHY: NORMAL
SEVERITY (EYE EXAM): NORMAL
SODIUM SERPL-SCNC: 141 MMOL/L (ref 135–147)
TRIGL SERPL-MCNC: 202 MG/DL
WBC # BLD AUTO: 11.52 THOUSAND/UL (ref 4.31–10.16)

## 2022-12-12 RX ORDER — OMEPRAZOLE 20 MG/1
20 CAPSULE, DELAYED RELEASE ORAL DAILY
Qty: 90 CAPSULE | Refills: 3 | Status: SHIPPED | OUTPATIENT
Start: 2022-12-12

## 2022-12-12 RX ORDER — FUROSEMIDE 20 MG/1
20 TABLET ORAL DAILY
Qty: 90 TABLET | Refills: 3 | Status: SHIPPED | OUTPATIENT
Start: 2022-12-12

## 2022-12-12 RX ORDER — ATORVASTATIN CALCIUM 10 MG/1
10 TABLET, FILM COATED ORAL DAILY
Qty: 90 TABLET | Refills: 3 | Status: SHIPPED | OUTPATIENT
Start: 2022-12-12

## 2022-12-12 RX ORDER — FENOFIBRATE 160 MG/1
160 TABLET ORAL DAILY
Qty: 90 TABLET | Refills: 3 | Status: SHIPPED | OUTPATIENT
Start: 2022-12-12 | End: 2023-12-12

## 2022-12-12 RX ORDER — POTASSIUM CHLORIDE 1500 MG/1
20 TABLET, FILM COATED, EXTENDED RELEASE ORAL DAILY
Qty: 90 TABLET | Refills: 3 | Status: SHIPPED | OUTPATIENT
Start: 2022-12-12

## 2022-12-12 RX ORDER — METOPROLOL SUCCINATE 25 MG/1
25 TABLET, EXTENDED RELEASE ORAL DAILY
Qty: 90 TABLET | Refills: 3 | Status: SHIPPED | OUTPATIENT
Start: 2022-12-12

## 2022-12-12 RX ORDER — KETOCONAZOLE 20 MG/G
CREAM TOPICAL DAILY
Qty: 15 G | Refills: 0 | Status: SHIPPED | OUTPATIENT
Start: 2022-12-12

## 2022-12-12 RX ORDER — VALSARTAN 320 MG/1
320 TABLET ORAL DAILY
Qty: 90 TABLET | Refills: 3 | Status: SHIPPED | OUTPATIENT
Start: 2022-12-12

## 2022-12-12 NOTE — PROGRESS NOTES
INTERNAL MEDICINE FOLLOW-UP VISIT  [facility] - [facility]    NAME: Chetna Sandhu  AGE: 79 y o  SEX: female  : 1955     DATE: 2022     Assessment and Plan:   1  Type 2 diabetes mellitus without complication, without long-term current use of insulin ([facility] [address] )  Checks blood glucose intermittently  Can't recall averages  Limit carbs and sweets in diet    2  Primary hypertension  Metoprolol daily, continue on this   Limit salt in diet, no more than 2000 mg daily  Intermittently checks BP at home, averaging 130/70's   Drink 60-70's of water per day    3  Mixed hyperlipidemia  Continue on atorvastatin   Limit sweets and carbs    4  Stage 3a chronic kidney disease ([facility] [address] )  Stay hydrated with 60-70 oz   Limit nephrotoxic medications, tylenol only for pain    Health maintenance  Mammogram  Colonoscopy due- seeing Dr Tuyet Hodge               BMI Counseling: Body mass index is 32 69 kg/m²  The BMI is above normal  Nutrition recommendations include decreasing portion sizes, encouraging healthy choices of fruits and vegetables, consuming healthier snacks, limiting drinks that contain sugar, moderation in carbohydrate intake, reducing intake of saturated and trans fat and reducing intake of cholesterol  Exercise recommendations include exercising 3-5 times per week  No pharmacotherapy was ordered  Rationale for BMI follow-up plan is due to patient being overweight or obese  Tobacco Cessation Counseling: Tobacco cessation counseling was provided  The patient is sincerely urged to quit consumption of tobacco  She is not ready to quit tobacco  Medication options and side effects of medication discussed  Patient refused medication  Lung Cancer Screening Shared Decision Making: I discussed with her that she is a candidate for lung cancer CT screening  The following Shared Decision-Making points were covered:  1   Benefits of screening were discussed, including the rates of reduction in death from lung cancer and other causes  Harms of screening were reviewed, including false positive tests, radiation exposure levels, risks of invasive procedures, risks of complications of screening, and risk of overdiagnosis  2  I counseled on the importance of adherence to annual lung cancer LDCT screening, impact of co-morbidities, and ability or willingness to undergo diagnosis and treatment  3  I counseled on the importance of maintaining abstinence as a former smoker or was counseled on the importance of smoking cessation if a current smoker    Review of Eligibility Criteria: She meets all of the criteria for Lung Cancer Screening    - She is 79 y o    - She has 20 pack year tobacco history and is a current smoker or has quit within the past 15 years  - She presents no signs or symptoms of lung cancer    After discussion, the patient decided to elect lung cancer screening  No follow-ups on file  Chief Complaint:     Chief Complaint   Patient presents with   • Follow-up      History of Present Illness:     Patient is here today for her follow-up  She is also due for labs  She is due for her mammogram as well as colonoscopy  She denies any complaints today  She is a diabetic however admits she does not check her blood sugar regularly and has not been taking her metformin as prescribed  She admits to eating salt in her diet and has noted some bilateral lower extremity edema      The following portions of the patient's history were reviewed and updated as appropriate: allergies, current medications, past family history, past medical history, past social history, past surgical history and problem list      Review of Systems:     Review of Systems     Past Medical History:     Past Medical History:   Diagnosis Date   • Depression    • Hyperlipidemia    • Hypertension    • Nodule of lower lobe of right lung         Current Medications:     Current Outpatient Medications:   •  ASPIRIN 81 PO, Take 81 mg by mouth daily , Disp: , Rfl:   •  atorvastatin (LIPITOR) 10 mg tablet, Take 1 tablet (10 mg total) by mouth daily, Disp: 90 tablet, Rfl: 3  •  colestipol (COLESTID) 1 g tablet, Take 2 tablets (2 g total) by mouth 3 (three) times a day, Disp: 540 tablet, Rfl: 0  •  cycloSPORINE (RESTASIS) 0 05 % ophthalmic emulsion, Restasis 0 05 % eye drops in a dropperette, Disp: , Rfl:   •  ergocalciferol (VITAMIN D2) 50,000 units, Take 1 capsule (50,000 Units total) by mouth once a week for 12 doses, Disp: 12 capsule, Rfl: 0  •  fenofibrate 160 MG tablet, Take 1 tablet (160 mg total) by mouth daily, Disp: 90 tablet, Rfl: 3  •  furosemide (LASIX) 20 mg tablet, Take 1 tablet (20 mg total) by mouth daily, Disp: 90 tablet, Rfl: 3  •  glucose blood (OneTouch Verio) test strip, Pt to test tid dx;73 03 pt  Requesting verio reflex test strips  , Disp: 300 strip, Rfl: 3  •  glucose blood (OneTouch Verio) test strip, Pt to test blood sugar  Daily, Disp: 100 strip, Rfl: 3  •  ketoconazole (NIZORAL) 2 % cream, Apply topically daily, Disp: 15 g, Rfl: 0  •  loperamide (IMODIUM) 2 mg capsule, Take 1 capsule (2 mg total) by mouth 2 (two) times a day with meals, Disp: 60 capsule, Rfl: 1  •  Loratadine 10 MG CAPS, as needed , Disp: , Rfl:   •  metFORMIN (GLUCOPHAGE) 500 mg tablet, Take 1 tablet (500 mg total) by mouth 2 (two) times a day with meals, Disp: 180 tablet, Rfl: 3  •  metoprolol succinate (TOPROL-XL) 25 mg 24 hr tablet, Take 1 tablet (25 mg total) by mouth daily, Disp: 90 tablet, Rfl: 3  •  Misc Natural Products (OSTEO BI-FLEX ADV DOUBLE ST PO), Take by mouth, Disp: , Rfl:   •  nystatin (MYCOSTATIN) powder, Apply topically 2 (two) times a day as needed (itching), Disp: 15 g, Rfl: 0  •  omeprazole (PriLOSEC) 20 mg delayed release capsule, Take 1 capsule (20 mg total) by mouth daily, Disp: 90 capsule, Rfl: 3  •  OneTouch Delica Lancets 41W MISC, Check blood glucose daily, Disp: 100 each, Rfl: 3  •  Potassium Chloride ER 20 MEQ TBCR, Take 1 tablet (20 mEq total) by mouth in the morning, Disp: 90 tablet, Rfl: 3  •  valsartan (DIOVAN) 320 MG tablet, Take 1 tablet (320 mg total) by mouth daily, Disp: 90 tablet, Rfl: 3     Allergies: Allergies   Allergen Reactions   • Ciprofloxacin Rash   • Codeine Rash        Physical Exam:     /86 (BP Location: Left arm)   Pulse 77   Ht 5' 1" (1 549 m)   Wt 78 5 kg (173 lb)   SpO2 99%   BMI 32 69 kg/m²     Physical Exam  Constitutional:       Appearance: She is well-developed  HENT:      Head: Normocephalic and atraumatic  Eyes:      Pupils: Pupils are equal, round, and reactive to light  Neck:      Thyroid: No thyromegaly  Cardiovascular:      Rate and Rhythm: Normal rate and regular rhythm  Heart sounds: No murmur heard  Pulmonary:      Effort: Pulmonary effort is normal       Breath sounds: Normal breath sounds  Abdominal:      General: Bowel sounds are normal       Palpations: Abdomen is soft  Musculoskeletal:         General: Normal range of motion  Cervical back: Normal range of motion and neck supple  Lymphadenopathy:      Cervical: No cervical adenopathy  Skin:     General: Skin is warm and dry  Neurological:      Mental Status: She is alert and oriented to person, place, and time  Data:     Laboratory Results: I have personally reviewed the pertinent laboratory results/reports   Radiology/Other Diagnostic Testing Results: I have personally reviewed pertinent reports        DAR Thornton  [facility]

## 2022-12-13 ENCOUNTER — TELEPHONE (OUTPATIENT)
Dept: INTERNAL MEDICINE CLINIC | Facility: CLINIC | Age: 67
End: 2022-12-13

## 2022-12-13 LAB
EST. AVERAGE GLUCOSE BLD GHB EST-MCNC: 126 MG/DL
HBA1C MFR BLD: 6 %

## 2022-12-13 NOTE — TELEPHONE ENCOUNTER
----- Message from [id number] sent at 12/13/2022  9:44 AM EST -----  HGA1C went down to 6 0 from 6 5  good job!!  All other labs look stable at your baseline

## 2022-12-13 NOTE — TELEPHONE ENCOUNTER
PT  CALLED   BACK    WANTS  TO KNOW  IF   SHE  STILL  NEEDS  TO  KEEP  TAKING    THE  José Miguel Dus

## 2022-12-22 ENCOUNTER — TELEPHONE (OUTPATIENT)
Dept: INTERNAL MEDICINE CLINIC | Facility: CLINIC | Age: 67
End: 2022-12-22

## 2022-12-30 ENCOUNTER — APPOINTMENT (OUTPATIENT)
Dept: LAB | Facility: CLINIC | Age: 67
End: 2022-12-30

## 2022-12-30 DIAGNOSIS — R19.7 DIARRHEA, UNSPECIFIED TYPE: ICD-10-CM

## 2022-12-31 LAB
C DIFF TOX A+B STL QL IA: NEGATIVE
C DIFF TOX GENS STL QL NAA+PROBE: POSITIVE
CAMPYLOBACTER DNA SPEC NAA+PROBE: NORMAL
SALMONELLA DNA SPEC QL NAA+PROBE: NORMAL
SHIGA TOXIN STX GENE SPEC NAA+PROBE: NORMAL
SHIGELLA DNA SPEC QL NAA+PROBE: NORMAL

## 2023-01-03 DIAGNOSIS — A04.72 C. DIFFICILE COLITIS: Primary | ICD-10-CM

## 2023-01-03 RX ORDER — VANCOMYCIN HYDROCHLORIDE 125 MG/1
125 CAPSULE ORAL 4 TIMES DAILY
Qty: 40 CAPSULE | Refills: 0 | Status: SHIPPED | OUTPATIENT
Start: 2023-01-03 | End: 2023-01-13

## 2023-01-05 LAB
G LAMBLIA AG STL QL IA: NEGATIVE
O+P STL CONC: NORMAL

## 2023-01-12 ENCOUNTER — TELEPHONE (OUTPATIENT)
Dept: GASTROENTEROLOGY | Facility: CLINIC | Age: 68
End: 2023-01-12

## 2023-01-12 NOTE — TELEPHONE ENCOUNTER
Pt needs to update insurance information  Infrm me she has [employer] but did not have the information with her at the time  Will call back later to update

## 2023-01-16 ENCOUNTER — TELEPHONE (OUTPATIENT)
Dept: GASTROENTEROLOGY | Facility: AMBULARY SURGERY CENTER | Age: 68
End: 2023-01-16

## 2023-01-16 DIAGNOSIS — A04.72 C. DIFFICILE COLITIS: Primary | ICD-10-CM

## 2023-01-16 NOTE — TELEPHONE ENCOUNTER
Spoke with patient  History of C diff    Patient c/o recurrent diarrhea 2 days after stopped vancomycin course for C Diff  Taking colestipol 1G tablet, 2 tables TID  She has not taken imodium in 2 months  Denies nausea, vomiting  Fever, chills, SOB, weakness, black or bloody stools  Any suggestions?

## 2023-01-16 NOTE — TELEPHONE ENCOUNTER
Patients GI provider:  Dr Vikas Cheney     Number to return call: [phone]    Reason for call: Pt calling requesting to speak with someone regarding vancomycin that is not helping       Scheduled procedure/appointment date if applicable: Apt/procedure

## 2023-02-01 ENCOUNTER — TELEPHONE (OUTPATIENT)
Dept: GASTROENTEROLOGY | Facility: CLINIC | Age: 68
End: 2023-02-01

## 2023-02-01 NOTE — TELEPHONE ENCOUNTER
Patients GI provider:  Yamilex MARTINEZ    Number to return call: [phone]    Reason for call: Pt calling because she was unable to  the stool sample kit because she didn't have transportation   She would like to know if she should keep her appointment    Scheduled procedure/appointment date if applicable: Appt 9/5/09

## 2023-02-06 ENCOUNTER — OFFICE VISIT (OUTPATIENT)
Dept: GASTROENTEROLOGY | Facility: CLINIC | Age: 68
End: 2023-02-06

## 2023-02-06 ENCOUNTER — TELEPHONE (OUTPATIENT)
Dept: INTERNAL MEDICINE CLINIC | Facility: CLINIC | Age: 68
End: 2023-02-06

## 2023-02-06 VITALS
DIASTOLIC BLOOD PRESSURE: 70 MMHG | HEIGHT: 61 IN | WEIGHT: 165 LBS | SYSTOLIC BLOOD PRESSURE: 140 MMHG | OXYGEN SATURATION: 97 % | HEART RATE: 86 BPM | BODY MASS INDEX: 31.15 KG/M2

## 2023-02-06 DIAGNOSIS — R19.7 DIARRHEA, UNSPECIFIED TYPE: ICD-10-CM

## 2023-02-06 DIAGNOSIS — E11.9 TYPE 2 DIABETES MELLITUS WITHOUT COMPLICATION, WITHOUT LONG-TERM CURRENT USE OF INSULIN (HCC): Primary | ICD-10-CM

## 2023-02-06 DIAGNOSIS — K58.0 IRRITABLE BOWEL SYNDROME WITH DIARRHEA: ICD-10-CM

## 2023-02-06 DIAGNOSIS — Z86.010 HISTORY OF COLON POLYPS: Primary | ICD-10-CM

## 2023-02-06 DIAGNOSIS — Z12.11 ENCOUNTER FOR SCREENING FOR MALIGNANT NEOPLASM OF COLON: ICD-10-CM

## 2023-02-06 RX ORDER — MONTELUKAST SODIUM 4 MG/1
2 TABLET, CHEWABLE ORAL 3 TIMES DAILY
Qty: 540 TABLET | Refills: 2 | Status: SHIPPED | OUTPATIENT
Start: 2023-02-06 | End: 2023-05-07

## 2023-02-06 RX ORDER — VANCOMYCIN HYDROCHLORIDE 125 MG/1
CAPSULE ORAL
COMMUNITY

## 2023-02-06 RX ORDER — GLIMEPIRIDE 1 MG/1
1 TABLET ORAL
Qty: 30 TABLET | Refills: 5 | Status: SHIPPED | OUTPATIENT
Start: 2023-02-06 | End: 2023-02-13 | Stop reason: SINTOL

## 2023-02-06 NOTE — TELEPHONE ENCOUNTER
Gastro Dr Jacinda Whyte the Metformin has side effect is diarrhea    Can you prescribe another RX   Call [phone]

## 2023-02-06 NOTE — PATIENT INSTRUCTIONS
Scheduled date of colonoscopy (as of today): 4/13/23  Physician performing colonoscopy: Beata  Location of colonoscopy: [place]  Bowel prep reviewed with patient: Percy/Thiago  Instructions reviewed with patient by: Merry GUZMAN  Clearances:

## 2023-02-06 NOTE — PROGRESS NOTES
[facility] - Outpatient Follow-up Note  Santos Mclaughlin 79 y o  female MRN: [id number]  Encounter: [id number]          ASSESSMENT AND PLAN:      1  Irritable bowel syndrome with diarrhea  2  History of colon polyps    Patient has a long history of diarrhea, abdominal cramping, and gas x years  Colonoscopy in October of 2020 showed a 30mm flat multilobulated polyp in the cecum which was extensive and infiltrating and not able to be removed endoscopically  She was referred to Dr Stevenson Steinberg and underwent a right hemicolectomy  Pathology was benign - tubular adenoma - no malignancy  Biopsies of her colon during the colonoscopy were also negative for microscopic colitis and she had negative celiac serology in the past   She was previously previously recommended for a follow up colonoscopy in the fall of 2021 but cancelled  She reports of worsening diarrhea since over December  She had stool cultures and c diff PCR was positive but EIA negative most c/w colonization rather than an active infection  However, she was given a Vancocin course due to the acute worsening of her chronic diarrhea with some improvement  She reports about 3, sometimes 4 episodes of loose stools a day  She is on Colestid TID  She also reports she had just started Metformin when her bowel movements worsened which likely may be a culprit as well  Will plan for colonoscopy  Continue the Colestid TID  Recommend a trial of a low FODMAP diet  Information sheet given  She will talk to her PCP about the Metformin potentially worsening her loose stools  ______________________________________________________________________    SUBJECTIVE:  Patient is a pleasant 79year old female who presents to the office for follow up of her IBS-D  She has a long history of diarrhea and gas x years  She had recently worsening in December   She had stool cultures and c diff PCR was positive but EIA negative most c/w colonization rather than an active infection  However, she was given a Vancocin course due to the acute worsening of her chronic diarrhea with some improvement  She reports about 3, sometimes 4 episodes of loose stools a day  She is on Colestid TID  She also reports she had just started Metformin when her bowel movements worsened which may likely be a culprit as well  She previously had a colonoscopy in October of 2020 showed a 30mm flat multilobulated polyp in the cecum which was extensive and infiltrating and not able to be removed endoscopically  She was referred to Dr Dimitri Daily and underwent a right hemicolectomy  Pathology was benign - tubular adenoma - no malignancy  She was previously previously recommended for a follow up colonoscopy in the fall of 2021 but cancelled  She tried fiber and probiotics in the past for her chronic diarrhea without improvement  REVIEW OF SYSTEMS IS OTHERWISE NEGATIVE        Historical Information   Past Medical History:   Diagnosis Date   • Depression    • Hyperlipidemia    • Hypertension    • Nodule of lower lobe of right lung      Past Surgical History:   Procedure Laterality Date   • CHOLECYSTECTOMY     • COLONOSCOPY     • HEMICOLOECTOMY W/ ANASTOMOSIS N/A 12/14/2020    Procedure: LAPAROSCOPIC HAND-ASSISTED RIGHT HEMICOLECTOMY;  Surgeon: Serena Lombardo MD;  Location: [facility];  Service: Surgical Oncology   • HYSTERECTOMY      partial     Social History   Social History     Substance and Sexual Activity   Alcohol Use Yes   • Alcohol/week: 1 0 standard drink   • Types: 1 Glasses of wine per week    Comment: on the weekend      Social History     Substance and Sexual Activity   Drug Use Never     Social History     Tobacco Use   Smoking Status Every Day   • Packs/day: 0 25   • Years: 52 00   • Pack years: 13 00   • Types: Cigarettes   Smokeless Tobacco Never     Family History   Problem Relation Age of Onset   • Throat cancer Mother    • Heart disease Father        Meds/Allergies Current Outpatient Medications:   •  ASPIRIN 81 PO  •  atorvastatin (LIPITOR) 10 mg tablet  •  colestipol (COLESTID) 1 g tablet  •  cycloSPORINE (RESTASIS) 0 05 % ophthalmic emulsion  •  fenofibrate 160 MG tablet  •  furosemide (LASIX) 20 mg tablet  •  glucose blood (OneTouch Verio) test strip  •  glucose blood (OneTouch Verio) test strip  •  ketoconazole (NIZORAL) 2 % cream  •  loperamide (IMODIUM) 2 mg capsule  •  Loratadine 10 MG CAPS  •  metFORMIN (GLUCOPHAGE) 500 mg tablet  •  metoprolol succinate (TOPROL-XL) 25 mg 24 hr tablet  •  Misc Natural Products (OSTEO BI-FLEX ADV DOUBLE ST PO)  •  nystatin (MYCOSTATIN) powder  •  omeprazole (PriLOSEC) 20 mg delayed release capsule  •  OneTouch Delica Lancets 99I MISC  •  Potassium Chloride ER 20 MEQ TBCR  •  valsartan (DIOVAN) 320 MG tablet  •  vancomycin (VANCOCIN) 125 MG capsule  •  ergocalciferol (VITAMIN D2) 50,000 units    Allergies   Allergen Reactions   • Ciprofloxacin Rash   • Codeine Rash           Objective     Blood pressure 140/70, pulse 86, height 5' 1" (1 549 m), weight 74 8 kg (165 lb), SpO2 97 %  Body mass index is 31 18 kg/m²  PHYSICAL EXAM:      General Appearance:   Alert, cooperative, no distress   HEENT:   Normocephalic, atraumatic, anicteric     Neck:  Supple, symmetrical, trachea midline   Lungs:   Clear to auscultation bilaterally; no rales, rhonchi or wheezing; respirations unlabored    Heart[de-identified]   Regular rate and rhythm; no murmur, rub, or gallop  Abdomen:   Soft, non-tender, non-distended; normal bowel sounds; no masses, no organomegaly    Genitalia:   Deferred    Rectal:   Deferred    Extremities:  No cyanosis, clubbing or edema    Pulses:  2+ and symmetric    Skin:  No jaundice, rashes, or lesions    Lymph nodes:  No palpable cervical lymphadenopathy        Lab Results:   No visits with results within 1 Day(s) from this visit     Latest known visit with results is:   Appointment on 12/30/2022   Component Date Value   • C difficile toxin by PCR 12/30/2022 Positive (A)    • C difficile Toxins A+B, * 12/30/2022 Negative    • Salmonella sp PCR 12/30/2022 None Detected    • Shigella sp/Enteroinvasi* 12/30/2022 None Detected    • Campylobacter sp (jejuni* 12/30/2022 None Detected    • Shiga toxin 1/Shiga toxi* 12/30/2022 None Detected    • Ova + Parasite Exam 12/30/2022 No ova, cysts, or parasites seen     One negative specimen does not rule out the possibility of a  parasitic infection  • Giardia Ag, Stl 12/30/2022 Negative          Radiology Results:   No results found

## 2023-02-13 ENCOUNTER — TELEPHONE (OUTPATIENT)
Dept: INTERNAL MEDICINE CLINIC | Facility: CLINIC | Age: 68
End: 2023-02-13

## 2023-02-13 DIAGNOSIS — E11.9 TYPE 2 DIABETES MELLITUS WITHOUT COMPLICATION, WITHOUT LONG-TERM CURRENT USE OF INSULIN (HCC): Primary | ICD-10-CM

## 2023-02-13 NOTE — TELEPHONE ENCOUNTER
Attila Miguel was sent to pharmacy to try as per message sent through my chart  and to return call if side effects arise  Pt agrees with plan

## 2023-02-13 NOTE — TELEPHONE ENCOUNTER
Having side effects from the glimepiride (AMARYL) 1 mg tablet  It is giving her the shakes and trembling  She has stopped taking it

## 2023-02-17 ENCOUNTER — TELEPHONE (OUTPATIENT)
Dept: GYNECOLOGIC ONCOLOGY | Facility: CLINIC | Age: 68
End: 2023-02-17

## 2023-02-17 NOTE — TELEPHONE ENCOUNTER
Patient called into the office to cancel her appointment with Dr Brandon Costa due to insurance issues with her CT   Patient will call back the office to abimael rosario

## 2023-02-20 ENCOUNTER — TELEPHONE (OUTPATIENT)
Dept: GASTROENTEROLOGY | Facility: AMBULARY SURGERY CENTER | Age: 68
End: 2023-02-20

## 2023-02-20 NOTE — TELEPHONE ENCOUNTER
Patients GI provider:  Laura Quiñones    Number to return call: (  [phone]    Reason for call: Pt calling to ask Joselyn Peralta if she is able to take Culturelle    Scheduled procedure/appointment date if applicable:  procedure   4/13/23

## 2023-03-29 ENCOUNTER — NURSE TRIAGE (OUTPATIENT)
Dept: OTHER | Facility: OTHER | Age: 68
End: 2023-03-29

## 2023-03-29 NOTE — TELEPHONE ENCOUNTER
"Regarding: Diarrhea  ----- Message from Claudio Addison sent at 3/29/2023  1:22 PM EDT -----  \"I have had diarrhea for the last month  It is just brown liquid  The colestipol (COLESTID) 1 g tablet is doing nothing  \"    "

## 2023-03-29 NOTE — TELEPHONE ENCOUNTER
"Patient called in to report severe watery diarrhea continues; has more than 10 episodes in 24 hours  Colestipol 1 g tablets increased at last OV 2/6/23 to 2 tablets 3 times per day with no relief  Hydrates but reports dry lips and voided an hour ago prior to triage  Patient reports mild abdominal growling sound and knows she has to go to [place]  Denies nausea, fever, dizziness, or blood in stool  Reports 6 pound weight loss  Protocol to send to ER; patient has no transportation and cares for two elderly persons  Please follow up with patient for care advice  Reason for Disposition  • SEVERE diarrhea (e g , 7 or more times / day more than normal) and age > 60 years    Answer Assessment - Initial Assessment Questions  1  DIARRHEA SEVERITY: \"How bad is the diarrhea? \" \"How many extra stools have you had in the past 24 hours than normal?\"     - NO DIARRHEA (SCALE 0)    - MILD (SCALE 1-3): Few loose or mushy BMs; increase of 1-3 stools over normal daily number of stools; mild increase in ostomy output  -  MODERATE (SCALE 4-7): Increase of 4-6 stools daily over normal; moderate increase in ostomy output  * SEVERE (SCALE 8-10; OR 'WORST POSSIBLE'): Increase of 7 or more stools daily over normal; moderate increase in ostomy output; incontinence  Severe; more than 10 times in 24 hour period  2  ONSET: \"When did the diarrhea begin? \"      Since last year with no relief since abdominal surgery last year  3  BM CONSISTENCY: \"How loose or watery is the diarrhea? \"       Watery  4  VOMITING: \"Are you also vomiting? \" If Yes, ask: \"How many times in the past 24 hours? \"       Denies  5  ABDOMINAL PAIN: Leslye Blackmon you having any abdominal pain? \" If Yes, ask: \"What does it feel like? \" (e g , crampy, dull, intermittent, constant)       Occasional mild pain that indicates patient needs to go to [place]; gets growling sound  6  ABDOMINAL PAIN SEVERITY: If present, ask: \"How bad is the pain? \"  (e g , Scale 1-10; mild, moderate, or severe)    - " "MILD (1-3): doesn't interfere with normal activities, abdomen soft and not tender to touch     - MODERATE (4-7): interferes with normal activities or awakens from sleep, tender to touch     - SEVERE (8-10): excruciating pain, doubled over, unable to do any normal activities        Mild  7  ORAL INTAKE: If vomiting, \"Have you been able to drink liquids? \" \"How much fluids have you had in the past 24 hours? \"      Hydrating  8  HYDRATION: \"Any signs of dehydration? \" (e g , dry mouth [not just dry lips], too weak to stand, dizziness, new weight loss) \"When did you last urinate? \"      Lips get dry; last urination was an hour ago; loss of 6 lbs  9  EXPOSURE: \"Have you traveled to a foreign country recently? \" \"Have you been exposed to anyone with diarrhea? \" \"Could you have eaten any food that was spoiled? \"      Denies  10  ANTIBIOTIC USE: \"Are you taking antibiotics now or have you taken antibiotics in the past 2 months? \"       Denies  11  OTHER SYMPTOMS: \"Do you have any other symptoms? \" (e g , fever, blood in stool)        Denies  12  PREGNANCY: \"Is there any chance you are pregnant? \" \"When was your last menstrual period? \"        Pos menopausal    Protocols used: DIARRHEA-ADULT-[place]    "

## 2023-03-30 DIAGNOSIS — R19.7 DIARRHEA, UNSPECIFIED TYPE: ICD-10-CM

## 2023-03-30 RX ORDER — LOPERAMIDE HYDROCHLORIDE 2 MG/1
CAPSULE ORAL
Qty: 60 CAPSULE | Refills: 0 | Status: SHIPPED | OUTPATIENT
Start: 2023-03-30

## 2023-03-30 NOTE — TELEPHONE ENCOUNTER
Spoke to pt informed her about the C Diff test she has to perform and she said its very difficult to get out, she had some imodium and took one yesterday and today and is doing better, I told her that wont be a good solution if she is positive for C Diff   Please advise

## 2023-03-30 NOTE — TELEPHONE ENCOUNTER
Spoke to pt she will stop the imodium, I gave her a number to assist with the lab so she can get the test done for C Diff she is aware and will do the test asap

## 2023-03-31 ENCOUNTER — TELEPHONE (OUTPATIENT)
Dept: GASTROENTEROLOGY | Facility: CLINIC | Age: 68
End: 2023-03-31

## 2023-03-31 NOTE — TELEPHONE ENCOUNTER
Patients GI provider:  Dr Rodríguez Half    Number to return call: [phone]    Reason for call: Pt calling because she said she received a text message about having an endoscopy  She states she doesn't know anything about that  She would like to speak with someone      Scheduled procedure/appointment date if applicable: Procedure 4/48/98

## 2023-03-31 NOTE — TELEPHONE ENCOUNTER
Spoke to her reminded her of her colonoscopy on 4/13/23  She is aware and is worried she will need to set up a ride for her procedure  She will contact [facility]

## 2023-04-25 DIAGNOSIS — R19.7 DIARRHEA, UNSPECIFIED TYPE: ICD-10-CM

## 2023-04-25 RX ORDER — LOPERAMIDE HYDROCHLORIDE 2 MG/1
CAPSULE ORAL
Qty: 60 CAPSULE | Refills: 0 | Status: SHIPPED | OUTPATIENT
Start: 2023-04-25

## 2023-04-26 ENCOUNTER — APPOINTMENT (OUTPATIENT)
Dept: LAB | Facility: HOSPITAL | Age: 68
End: 2023-04-26

## 2023-04-26 DIAGNOSIS — A04.72 C. DIFFICILE COLITIS: ICD-10-CM

## 2023-04-27 ENCOUNTER — TELEPHONE (OUTPATIENT)
Dept: GASTROENTEROLOGY | Facility: AMBULARY SURGERY CENTER | Age: 68
End: 2023-04-27

## 2023-04-27 LAB — C DIFF TOX GENS STL QL NAA+PROBE: NEGATIVE

## 2023-04-27 NOTE — TELEPHONE ENCOUNTER
Patients GI provider:  PA: Bay Wallace     Number to return call: [phone]    Reason for call: Pt calling requesting to speak with someone to go over results of stool sample     Scheduled procedure/appointment date if applicable: Apt/procedure n/a

## 2023-04-28 DIAGNOSIS — B35.9 TINEA: ICD-10-CM

## 2023-04-28 RX ORDER — KETOCONAZOLE 20 MG/G
CREAM TOPICAL DAILY
Qty: 15 G | Refills: 0 | Status: SHIPPED | OUTPATIENT
Start: 2023-04-28

## 2023-04-28 NOTE — TELEPHONE ENCOUNTER
Medication failed [facility] protocol  Please forward to your office staff for further review as this medication was reviewed by a [facility] RN

## 2023-05-18 ENCOUNTER — TELEPHONE (OUTPATIENT)
Dept: GASTROENTEROLOGY | Facility: CLINIC | Age: 68
End: 2023-05-18

## 2023-05-18 NOTE — TELEPHONE ENCOUNTER
Spoke with patient, loose stool still ongoing, 3-4 times a day  Takes Colestid TID no relief  Denies: abdominal pain, n/v, no bloody stools  Patient declined to try questran powder but is asking to try dicyclomine to see if this helps her loose stools  Please advise  Patient is not able to have colonoscopy done at this time due to caring for her sister who needs full time care

## 2023-05-18 NOTE — TELEPHONE ENCOUNTER
Patients GI provider:  Precious Johns    Number to return call: [phone]    Reason for call: Pt called in requesting to speak to a nurse in regards to her diarrhea  Above is her call back number       Scheduled procedure/appointment date if applicable: Apt/procedure NA

## 2023-05-19 ENCOUNTER — TELEPHONE (OUTPATIENT)
Dept: GASTROENTEROLOGY | Facility: CLINIC | Age: 68
End: 2023-05-19

## 2023-05-19 NOTE — TELEPHONE ENCOUNTER
Patients GI provider:  PA Severa Honour    Number to return call: [phone]    Reason for call: Pt requesting to speak with Faustina Kim in regards to medication question  Please return patients call

## 2023-06-15 DIAGNOSIS — K58.0 IRRITABLE BOWEL SYNDROME WITH DIARRHEA: ICD-10-CM

## 2023-06-15 RX ORDER — DICYCLOMINE HYDROCHLORIDE 10 MG/1
10 CAPSULE ORAL
Qty: 120 CAPSULE | Refills: 0 | Status: SHIPPED | OUTPATIENT
Start: 2023-06-15 | End: 2023-07-15

## 2023-07-08 DIAGNOSIS — K58.0 IRRITABLE BOWEL SYNDROME WITH DIARRHEA: ICD-10-CM

## 2023-07-10 RX ORDER — DICYCLOMINE HYDROCHLORIDE 10 MG/1
CAPSULE ORAL
Qty: 120 CAPSULE | Refills: 0 | Status: SHIPPED | OUTPATIENT
Start: 2023-07-10

## 2023-07-31 ENCOUNTER — NURSE TRIAGE (OUTPATIENT)
Age: 68
End: 2023-07-31

## 2023-07-31 DIAGNOSIS — R19.7 DIARRHEA, UNSPECIFIED TYPE: Primary | ICD-10-CM

## 2023-07-31 NOTE — TELEPHONE ENCOUNTER
SPOKE WITH PT, REPORTS PERSISTENT WATERY DIARRHEA EPISODES 3-4 TIMES DAILY DESPITE TAKING DICYCLOMINE QID, COLESTIPOL TID- QID, ALSO FOR THE PAST 2 WEEKS PT SEES VALSARTAN TABLET IN THE TOILET PASSING THROUGH WHOLE. PT DENIES FEVER, CHILLS, SICK CONTACTS, BLACK OR BLOODY STOOL, ABDOMINAL PAIN. PLEASE ADVISE ANY RECOMMENDATIONS.

## 2023-07-31 NOTE — TELEPHONE ENCOUNTER
Hi team, please let her know that I put in the stool studies to make sure there is no inflammation or issues with her pancreas digestion. Depending on her test results, will help us guide add on treatment. Thank you.

## 2023-07-31 NOTE — TELEPHONE ENCOUNTER
Constantino Murray! Jonita Lombard is out of the office today for comp days. Can we get her rescheduled for a colonoscopy?

## 2023-07-31 NOTE — TELEPHONE ENCOUNTER
----- Message from [facility] sent at 7/31/2023 11:02 AM EDT -----  Pt calling because she states she is still having bouts of diarrhea

## 2023-08-01 NOTE — TELEPHONE ENCOUNTER
Called pt, no answer, unable to leave a voicemail, will try again this afternoon     Stool test mailed to the pt

## 2023-08-01 NOTE — TELEPHONE ENCOUNTER
Spoke w/ the pt, she stated she would like to have the colonoscopy done due to symptoms.     Her transportation is VERY limited, does she need an office appointment first?     If not please schedule, I did mail her a miralax/dul prep

## 2023-08-03 NOTE — TELEPHONE ENCOUNTER
Spoke to ptn, she wants me to call her back on Monday to work out when we can schedule her colonoscopy

## 2023-08-05 DIAGNOSIS — K58.0 IRRITABLE BOWEL SYNDROME WITH DIARRHEA: ICD-10-CM

## 2023-08-07 RX ORDER — DICYCLOMINE HYDROCHLORIDE 10 MG/1
CAPSULE ORAL
Qty: 120 CAPSULE | Refills: 0 | Status: SHIPPED | OUTPATIENT
Start: 2023-08-07

## 2023-08-07 NOTE — TELEPHONE ENCOUNTER
Tried calling ptn to schedule her colonoscopy but she did not answer and there was no VM to leave a message

## 2023-08-08 NOTE — TELEPHONE ENCOUNTER
Tried calling ptn again to schedule her colonoscopy but she did not answer and there was no VM to leave a message

## 2023-09-16 NOTE — ASSESSMENT & PLAN NOTE
Preliminary urine culture report reviewed. Patient discharged from ED with Rx for Cephalexin,awaiting sensitivity. The patient with chronically elevated WBC count  The patient continues to smoke  She does have emphysema  I believe this elevation is in her white cell count is due to the above  We will continue to monitor

## 2023-10-31 DIAGNOSIS — I10 PRIMARY HYPERTENSION: ICD-10-CM

## 2023-11-01 RX ORDER — METOPROLOL SUCCINATE 25 MG/1
25 TABLET, EXTENDED RELEASE ORAL DAILY
Qty: 90 TABLET | Refills: 0 | Status: SHIPPED | OUTPATIENT
Start: 2023-11-01

## 2023-11-05 DIAGNOSIS — R19.7 DIARRHEA, UNSPECIFIED TYPE: ICD-10-CM

## 2023-11-06 ENCOUNTER — TELEPHONE (OUTPATIENT)
Age: 68
End: 2023-11-06

## 2023-11-06 RX ORDER — MONTELUKAST SODIUM 4 MG/1
2 TABLET, CHEWABLE ORAL 3 TIMES DAILY
Qty: 540 TABLET | Refills: 0 | Status: SHIPPED | OUTPATIENT
Start: 2023-11-06

## 2023-11-06 NOTE — TELEPHONE ENCOUNTER
Patients GI provider:  JUAN Lazo    Number to return call: ( [phone]    Reason for call: Pt calling asking to speak with someone concerning her diarrhea symptoms    Scheduled procedure/appointment date if applicable: N/A

## 2023-11-09 DIAGNOSIS — E78.2 MIXED HYPERLIPIDEMIA: Primary | ICD-10-CM

## 2023-11-09 DIAGNOSIS — D72.829 LEUKOCYTOSIS, UNSPECIFIED TYPE: ICD-10-CM

## 2023-11-09 DIAGNOSIS — E11.9 TYPE 2 DIABETES MELLITUS WITHOUT COMPLICATION, WITHOUT LONG-TERM CURRENT USE OF INSULIN (HCC): ICD-10-CM

## 2023-11-09 DIAGNOSIS — I10 PRIMARY HYPERTENSION: ICD-10-CM

## 2023-11-09 DIAGNOSIS — Z13.29 SCREENING FOR THYROID DISORDER: ICD-10-CM

## 2023-11-09 DIAGNOSIS — N18.31 STAGE 3A CHRONIC KIDNEY DISEASE (HCC): ICD-10-CM

## 2023-11-09 DIAGNOSIS — E87.6 HYPOKALEMIA: ICD-10-CM

## 2023-11-09 RX ORDER — VALSARTAN 320 MG/1
320 TABLET ORAL DAILY
Qty: 30 TABLET | Refills: 0 | Status: SHIPPED | OUTPATIENT
Start: 2023-11-09

## 2023-11-10 NOTE — TELEPHONE ENCOUNTER
MULTIPLE ATTEMPTS TO REACH PT X3, EACH TIME I CAN HEAR PT, NOT SURE IF SHE CAN HEAR ME, SHE HANGS UP, LAST ATTEMPT PHONE JUST RANG, THEN DISCONNECTED. PLEASE TRY TO REACH PT TO SCHEDULE OFFICE VISIT.

## 2023-11-15 ENCOUNTER — OFFICE VISIT (OUTPATIENT)
Age: 68
End: 2023-11-15
Payer: COMMERCIAL

## 2023-11-15 ENCOUNTER — TELEPHONE (OUTPATIENT)
Age: 68
End: 2023-11-15

## 2023-11-15 ENCOUNTER — NURSE TRIAGE (OUTPATIENT)
Age: 68
End: 2023-11-15

## 2023-11-15 VITALS
BODY MASS INDEX: 30.58 KG/M2 | HEART RATE: 89 BPM | OXYGEN SATURATION: 98 % | HEIGHT: 61 IN | WEIGHT: 162 LBS | DIASTOLIC BLOOD PRESSURE: 70 MMHG | SYSTOLIC BLOOD PRESSURE: 162 MMHG

## 2023-11-15 DIAGNOSIS — R19.7 DIARRHEA, UNSPECIFIED TYPE: Primary | ICD-10-CM

## 2023-11-15 DIAGNOSIS — K58.0 IRRITABLE BOWEL SYNDROME WITH DIARRHEA: Primary | ICD-10-CM

## 2023-11-15 DIAGNOSIS — R14.0 BLOATING: ICD-10-CM

## 2023-11-15 DIAGNOSIS — R19.7 DIARRHEA, UNSPECIFIED TYPE: ICD-10-CM

## 2023-11-15 PROCEDURE — 99214 OFFICE O/P EST MOD 30 MIN: CPT | Performed by: PHYSICIAN ASSISTANT

## 2023-11-15 NOTE — TELEPHONE ENCOUNTER
FYI:    Patient seen in office this morning, scheduled for combo 1/3/24  Ms. Abbie Mckenna  states she has been having the green diarrhea for a few weeks not and she was asking if antibiotic ordered as discussed at visit . I reviewed that Xifaxan was ordered and sent to her pharmacy. The medication would most likely need to be authorized by your insurance so she may not be able to obtain it today. If prior authorization is required it could take a few days to a week. Answer Assessment - Initial Assessment Questions  1. REASON FOR CALL or QUESTION: "What is your reason for calling today?" or "How can I best help you?" or "What question do you have that I can help answer?"      Medication question. Protocols used:  Information Only Call - No Triage-ADULT-[place]

## 2023-11-15 NOTE — PATIENT INSTRUCTIONS
Scheduled date of EGD/colonoscopy (as of today): 12/20/23  Physician performing EGD/colonoscopy: Eva  Location of EGD/colonoscopy: [facility]  Desired bowel prep reviewed with patient: Miralax  Instructions reviewed with patient by: Sheila GUZMAN  Clearances:

## 2023-11-15 NOTE — TELEPHONE ENCOUNTER
Regarding: symptoms  ----- Message from [facility] sent at 11/15/2023  3:02 PM EST -----  Patients GI provider:  PA: Violet     Number to return call: [phone]    Reason for call: Pt calling requesting to speak with nurse Hermelindo Mac regarding concerning green watery diarrhea. Wants to know if this is from the antibiotic she was taking.      Scheduled procedure/appointment date if applicable: Apt/procedure

## 2023-11-15 NOTE — PROGRESS NOTES
[facility] - Outpatient Follow-up Note  Carolyn Link 76 y.o. female MRN: [id number]  Encounter: [id number]          ASSESSMENT AND PLAN:      1. Irritable bowel syndrome with diarrhea  2. History of colon polyps    Patient has a long history of diarrhea, abdominal cramping, and gas x years. She has a history of IBS-D. Colonoscopy in October of 2020 showed a 30mm flat multilobulated polyp in the cecum which was extensive and infiltrating and not able to be removed endoscopically. She was referred to Dr. Jace Rodríguez and underwent a right hemicolectomy. Pathology was benign - tubular adenoma - no malignancy. Biopsies of her colon during the colonoscopy were also negative for microscopic colitis and she had negative celiac serology in the past.  She was previously previously recommended for a follow up colonoscopy in the fall of 2021 but cancelled. She has failed Colestid/Questran, Imodium, fiber supplementation. She reports a Xifaxan course helped her a couple of years ago. She reports of worsening symptoms and increased bloating/belching now as well as some weight loss. Will plan for EGD with biopsies of the stomach and duodenum and colonoscopy with visualization of the terminal ileum and biopsies to investigate. Will check a CT Scan A/P/enterography to investigate. Will check a CBC, CMP, and TSH level. Recommend a trial of a low FODMAP diet and will utilize a Xifaxan 550mg po TID x 14 days as she reports responding favorably a couple of years ago.  ______________________________________________________________________    SUBJECTIVE:  Patient is a pleasant 76year old female presents to the office for follow up of her IBS-D. She has a long history of diarrhea and gas x years. She previously had a colonoscopy in October of 2020 showed a 30mm flat multilobulated polyp in the cecum which was extensive and infiltrating and not able to be removed endoscopically.  She was referred to Dr. Annemarie Enriquez and underwent a right hemicolectomy. Pathology was benign - tubular adenoma - no malignancy. She was previously previously recommended for a follow up colonoscopy in the fall of 2021 but cancelled and was also recommended to have it earlier this year but did not. She reports multiple bowel movements a day. She reports gas and bloating. No postprandial abdominal pain. She reports she has had some weight loss recently. No rectal bleeding. No nocturnal symptoms. She has failed Colestid/Questran, Imodium, fiber supplementation. She reports a Xifaxan course helped her a couple of years ago. REVIEW OF SYSTEMS IS OTHERWISE NEGATIVE.       Historical Information   Past Medical History:   Diagnosis Date    Depression     Hyperlipidemia     Hypertension     Nodule of lower lobe of right lung      Past Surgical History:   Procedure Laterality Date    CHOLECYSTECTOMY      COLONOSCOPY      HEMICOLOECTOMY W/ ANASTOMOSIS N/A 12/14/2020    Procedure: LAPAROSCOPIC HAND-ASSISTED RIGHT HEMICOLECTOMY;  Surgeon: Payton Gudino MD;  Location: [facility];  Service: Surgical Oncology    HYSTERECTOMY      partial     Social History   Social History     Substance and Sexual Activity   Alcohol Use Yes    Alcohol/week: 1.0 standard drink of alcohol    Types: 1 Glasses of wine per week    Comment: on the weekend      Social History     Substance and Sexual Activity   Drug Use Never     Social History     Tobacco Use   Smoking Status Every Day    Packs/day: 0.25    Years: 52.00    Total pack years: 13.00    Types: Cigarettes   Smokeless Tobacco Never     Family History   Problem Relation Age of Onset    Throat cancer Mother     Heart disease Father        Meds/Allergies       Current Outpatient Medications:     ASPIRIN 81 PO    atorvastatin (LIPITOR) 10 mg tablet    fenofibrate 160 MG tablet    furosemide (LASIX) 20 mg tablet    ketoconazole (NIZORAL) 2 % cream    Loratadine 10 MG CAPS    metoprolol succinate (TOPROL-XL) 25 mg 24 hr tablet    Misc Natural Products (OSTEO BI-FLEX ADV DOUBLE ST PO)    nystatin (MYCOSTATIN) powder    omeprazole (PriLOSEC) 20 mg delayed release capsule    OneTouch Delica Lancets 66V MISC    Potassium Chloride ER 20 MEQ TBCR    rifaximin (XIFAXAN) 550 mg tablet    sitaGLIPtin (JANUVIA) 100 mg tablet    valsartan (DIOVAN) 320 MG tablet    colestipol (COLESTID) 1 g tablet    ergocalciferol (VITAMIN D2) 50,000 units    Allergies   Allergen Reactions    Ciprofloxacin Rash    Codeine Rash           Objective     Blood pressure 162/70, pulse 89, height 5' 1" (1.549 m), weight 73.5 kg (162 lb), SpO2 98 %. Body mass index is 30.61 kg/m². PHYSICAL EXAM:      General Appearance:   Alert, cooperative, no distress   HEENT:   Normocephalic, atraumatic, anicteric     Neck:  Supple, symmetrical, trachea midline   Lungs:   Clear to auscultation bilaterally; no rales, rhonchi or wheezing; respirations unlabored    Heart[de-identified]   Regular rate and rhythm; no murmur, rub, or gallop. Abdomen:   Soft, non-tender, non-distended; normal bowel sounds; no masses, no organomegaly    Genitalia:   Deferred    Rectal:   Deferred    Extremities:  No cyanosis, clubbing or edema    Pulses:  2+ and symmetric    Skin:  No jaundice, rashes, or lesions    Lymph nodes:  No palpable cervical lymphadenopathy        Lab Results:   No visits with results within 1 Day(s) from this visit. Latest known visit with results is:   Appointment on 04/26/2023   Component Date Value    C.difficile toxin by PCR 04/26/2023 Negative          Radiology Results:   No results found.

## 2023-11-15 NOTE — TELEPHONE ENCOUNTER
PA submitted for xifaxan via [employer]. Clinical questions answered, chart notes sent. Awaiting determination.

## 2023-11-15 NOTE — TELEPHONE ENCOUNTER
Pt rescheduled colon/egd for 1/3/24 w/ Dr. Cuong Acevedo at [facility].

## 2023-11-16 ENCOUNTER — TRANSCRIBE ORDERS (OUTPATIENT)
Dept: GASTROENTEROLOGY | Facility: CLINIC | Age: 68
End: 2023-11-16

## 2023-11-16 ENCOUNTER — TELEPHONE (OUTPATIENT)
Age: 68
End: 2023-11-16

## 2023-11-17 ENCOUNTER — TRANSCRIBE ORDERS (OUTPATIENT)
Dept: GASTROENTEROLOGY | Facility: CLINIC | Age: 68
End: 2023-11-17

## 2023-11-20 NOTE — TELEPHONE ENCOUNTER
Pt calling because she just spoke with insurance and says they will be sending paperwork to us for this to be filled out and returned. She wanted us to be aware.

## 2023-11-21 DIAGNOSIS — I10 ESSENTIAL HYPERTENSION: ICD-10-CM

## 2023-11-21 DIAGNOSIS — E78.2 MIXED HYPERLIPIDEMIA: ICD-10-CM

## 2023-11-21 RX ORDER — ATORVASTATIN CALCIUM 10 MG/1
10 TABLET, FILM COATED ORAL DAILY
Qty: 90 TABLET | Refills: 0 | Status: SHIPPED | OUTPATIENT
Start: 2023-11-21

## 2023-11-21 RX ORDER — FUROSEMIDE 20 MG/1
20 TABLET ORAL DAILY
Qty: 90 TABLET | Refills: 0 | Status: SHIPPED | OUTPATIENT
Start: 2023-11-21

## 2023-11-21 NOTE — TELEPHONE ENCOUNTER
In media only form seen is key to CoverMyMeds  Resubmitted PA for Xifaxan through CoverMyMeds  Key: [id number] [facility]

## 2023-11-21 NOTE — TELEPHONE ENCOUNTER
Patients GI provider:  [facility]     Number to return call: [phone]  Reason for call: Pt calling in for an update on the prior Auth for Xifaxan. Informed patient forms were faxed as of this morning.     Scheduled procedure/appointment date if applicable: Apt/procedure 01/03/2024

## 2023-11-22 ENCOUNTER — TELEPHONE (OUTPATIENT)
Age: 68
End: 2023-11-22

## 2023-11-22 NOTE — TELEPHONE ENCOUNTER
Patients GI provider:  Dr. Darylene Pitman    Number to return call: [phone]    Reason for call: Pt called in wanting number for billing to ask about having to pay for her colonoscopy    Scheduled procedure/appointment date if applicable: procedure 5/1/46

## 2023-11-30 ENCOUNTER — TELEPHONE (OUTPATIENT)
Age: 68
End: 2023-11-30

## 2023-11-30 NOTE — TELEPHONE ENCOUNTER
Scheduled date of EGD/colonoscopy (as of today):1/24/2024  Physician performing EGD/colonoscopy:Dr. Deng Humphries  Location of EGD/colonoscopy[facility]  Desired bowel prep reviewed with patient:Miralax/Dul  Instructions reviewed with patient by:prep was given to patient at the [facility] on 11/15/2023  Clearances:  n/a    Reason for rescheduling: Patient is currently waiting for financial assistance form [facility]

## 2023-12-01 ENCOUNTER — APPOINTMENT (OUTPATIENT)
Age: 68
End: 2023-12-01
Payer: COMMERCIAL

## 2023-12-01 ENCOUNTER — OFFICE VISIT (OUTPATIENT)
Age: 68
End: 2023-12-01
Payer: COMMERCIAL

## 2023-12-01 VITALS
RESPIRATION RATE: 18 BRPM | SYSTOLIC BLOOD PRESSURE: 160 MMHG | DIASTOLIC BLOOD PRESSURE: 78 MMHG | HEART RATE: 75 BPM | TEMPERATURE: 95 F | WEIGHT: 163 LBS | BODY MASS INDEX: 30.8 KG/M2 | OXYGEN SATURATION: 97 %

## 2023-12-01 DIAGNOSIS — B35.9 TINEA: ICD-10-CM

## 2023-12-01 DIAGNOSIS — E11.9 TYPE 2 DIABETES MELLITUS WITHOUT COMPLICATION, WITHOUT LONG-TERM CURRENT USE OF INSULIN (HCC): ICD-10-CM

## 2023-12-01 DIAGNOSIS — Z76.89 ENCOUNTER TO ESTABLISH CARE: Primary | ICD-10-CM

## 2023-12-01 DIAGNOSIS — R19.7 DIARRHEA, UNSPECIFIED TYPE: ICD-10-CM

## 2023-12-01 DIAGNOSIS — Z78.0 ASYMPTOMATIC POSTMENOPAUSAL STATE: ICD-10-CM

## 2023-12-01 DIAGNOSIS — I10 PRIMARY HYPERTENSION: ICD-10-CM

## 2023-12-01 DIAGNOSIS — B37.2 SKIN YEAST INFECTION: ICD-10-CM

## 2023-12-01 DIAGNOSIS — R53.83 FATIGUE, UNSPECIFIED TYPE: ICD-10-CM

## 2023-12-01 PROBLEM — E87.6 HYPOKALEMIA: Status: RESOLVED | Noted: 2020-10-09 | Resolved: 2023-12-01

## 2023-12-01 LAB
ALBUMIN SERPL BCP-MCNC: 4.4 G/DL (ref 3.5–5)
ALP SERPL-CCNC: 56 U/L (ref 34–104)
ALT SERPL W P-5'-P-CCNC: 13 U/L (ref 7–52)
ANION GAP SERPL CALCULATED.3IONS-SCNC: 9 MMOL/L
AST SERPL W P-5'-P-CCNC: 18 U/L (ref 13–39)
BASOPHILS # BLD AUTO: 0.05 THOUSANDS/ÂΜL (ref 0–0.1)
BASOPHILS NFR BLD AUTO: 1 % (ref 0–1)
BILIRUB SERPL-MCNC: 0.47 MG/DL (ref 0.2–1)
BUN SERPL-MCNC: 11 MG/DL (ref 5–25)
CALCIUM SERPL-MCNC: 9.4 MG/DL (ref 8.4–10.2)
CHLORIDE SERPL-SCNC: 107 MMOL/L (ref 96–108)
CHOLEST SERPL-MCNC: 168 MG/DL
CO2 SERPL-SCNC: 25 MMOL/L (ref 21–32)
CREAT SERPL-MCNC: 0.84 MG/DL (ref 0.6–1.3)
CREAT UR-MCNC: 73.8 MG/DL
EOSINOPHIL # BLD AUTO: 0.26 THOUSAND/ÂΜL (ref 0–0.61)
EOSINOPHIL NFR BLD AUTO: 2 % (ref 0–6)
ERYTHROCYTE [DISTWIDTH] IN BLOOD BY AUTOMATED COUNT: 12.5 % (ref 11.6–15.1)
EST. AVERAGE GLUCOSE BLD GHB EST-MCNC: 131 MG/DL
GFR SERPL CREATININE-BSD FRML MDRD: 71 ML/MIN/1.73SQ M
GLUCOSE P FAST SERPL-MCNC: 94 MG/DL (ref 65–99)
HBA1C MFR BLD: 6.2 %
HCT VFR BLD AUTO: 42 % (ref 34.8–46.1)
HDLC SERPL-MCNC: 48 MG/DL
HGB BLD-MCNC: 14 G/DL (ref 11.5–15.4)
IMM GRANULOCYTES # BLD AUTO: 0.1 THOUSAND/UL (ref 0–0.2)
IMM GRANULOCYTES NFR BLD AUTO: 1 % (ref 0–2)
LDLC SERPL CALC-MCNC: 64 MG/DL (ref 0–100)
LYMPHOCYTES # BLD AUTO: 2.66 THOUSANDS/ÂΜL (ref 0.6–4.47)
LYMPHOCYTES NFR BLD AUTO: 24 % (ref 14–44)
MAGNESIUM SERPL-MCNC: 1.5 MG/DL (ref 1.9–2.7)
MCH RBC QN AUTO: 31.7 PG (ref 26.8–34.3)
MCHC RBC AUTO-ENTMCNC: 33.3 G/DL (ref 31.4–37.4)
MCV RBC AUTO: 95 FL (ref 82–98)
MICROALBUMIN UR-MCNC: 11.7 MG/L
MICROALBUMIN/CREAT 24H UR: 16 MG/G CREATININE (ref 0–30)
MONOCYTES # BLD AUTO: 0.79 THOUSAND/ÂΜL (ref 0.17–1.22)
MONOCYTES NFR BLD AUTO: 7 % (ref 4–12)
NEUTROPHILS # BLD AUTO: 7.06 THOUSANDS/ÂΜL (ref 1.85–7.62)
NEUTS SEG NFR BLD AUTO: 65 % (ref 43–75)
NRBC BLD AUTO-RTO: 0 /100 WBCS
PLATELET # BLD AUTO: 275 THOUSANDS/UL (ref 149–390)
PMV BLD AUTO: 10.9 FL (ref 8.9–12.7)
POTASSIUM SERPL-SCNC: 3.9 MMOL/L (ref 3.5–5.3)
PROT SERPL-MCNC: 7 G/DL (ref 6.4–8.4)
RBC # BLD AUTO: 4.42 MILLION/UL (ref 3.81–5.12)
SODIUM SERPL-SCNC: 141 MMOL/L (ref 135–147)
TRIGL SERPL-MCNC: 281 MG/DL
TSH SERPL DL<=0.05 MIU/L-ACNC: 1.8 UIU/ML (ref 0.45–4.5)
WBC # BLD AUTO: 10.92 THOUSAND/UL (ref 4.31–10.16)

## 2023-12-01 PROCEDURE — 99214 OFFICE O/P EST MOD 30 MIN: CPT

## 2023-12-01 PROCEDURE — 36415 COLL VENOUS BLD VENIPUNCTURE: CPT

## 2023-12-01 PROCEDURE — 80061 LIPID PANEL: CPT

## 2023-12-01 PROCEDURE — 80053 COMPREHEN METABOLIC PANEL: CPT

## 2023-12-01 PROCEDURE — 82043 UR ALBUMIN QUANTITATIVE: CPT

## 2023-12-01 PROCEDURE — 82570 ASSAY OF URINE CREATININE: CPT

## 2023-12-01 PROCEDURE — 85025 COMPLETE CBC W/AUTO DIFF WBC: CPT

## 2023-12-01 PROCEDURE — 84443 ASSAY THYROID STIM HORMONE: CPT

## 2023-12-01 PROCEDURE — 83036 HEMOGLOBIN GLYCOSYLATED A1C: CPT

## 2023-12-01 PROCEDURE — 83735 ASSAY OF MAGNESIUM: CPT

## 2023-12-01 RX ORDER — KETOCONAZOLE 20 MG/G
CREAM TOPICAL DAILY
Qty: 15 G | Refills: 0 | Status: SHIPPED | OUTPATIENT
Start: 2023-12-01 | End: 2023-12-01 | Stop reason: SDUPTHER

## 2023-12-01 RX ORDER — VALSARTAN 320 MG/1
320 TABLET ORAL DAILY
Qty: 90 TABLET | Refills: 3 | Status: SHIPPED | OUTPATIENT
Start: 2023-12-01 | End: 2024-11-25

## 2023-12-01 RX ORDER — KETOCONAZOLE 20 MG/G
CREAM TOPICAL DAILY
Qty: 60 G | Refills: 0 | Status: SHIPPED | OUTPATIENT
Start: 2023-12-01

## 2023-12-01 RX ORDER — NYSTATIN 100000 [USP'U]/G
POWDER TOPICAL 2 TIMES DAILY PRN
Qty: 15 G | Refills: 0 | Status: SHIPPED | OUTPATIENT
Start: 2023-12-01 | End: 2023-12-07 | Stop reason: SDUPTHER

## 2023-12-01 NOTE — PROGRESS NOTES
Name: Janes Scanlon      : 1955      MRN: [id number]  Encounter Provider: Bjorn Lemus  Encounter Date: 2023   Encounter department: [address] [facility]     1. Encounter to establish care  Comments:  Reviewed current medications, refills provided. Reviewed DM, HTN, CKD, and HLD. 2. Primary hypertension  Comments:  Take home BP, watch salt and dietary intake, reduce stress, increase activity. If BP staying above 150/90, she let me know and meds adjustments will be made. Orders:  -     valsartan (DIOVAN) 320 MG tablet; Take 1 tablet (320 mg total) by mouth daily    3. Type 2 diabetes mellitus without complication, without long-term current use of insulin (HCC)  Comments:  Reassured Metformin is safe for her kidney function. Get labs today. Start on Januvia, if issue with cost, is willing to restart metformin. Check BS at home. Orders:  -     Magnesium; Future  -     Lipid Panel with Direct LDL reflex; Future  -     sitaGLIPtin (JANUVIA) 100 mg tablet; Take 1 tablet (100 mg total) by mouth daily  -     HEMOGLOBIN A1C W/ EAG ESTIMATION; Future  -     Albumin / creatinine urine ratio; Future; Expected date: 2023    4. Tinea  Comments:  No current rash, will refill cream.  Orders:  -     ketoconazole (NIZORAL) 2 % cream; Apply topically daily    5. Skin yeast infection  Comments:  Refill provided, not currently having a rash. Orders:  -     nystatin (MYCOSTATIN) powder; Apply topically 2 (two) times a day as needed (itching)    6. Asymptomatic postmenopausal state  Comments:  Pt due for DXA scan in 2024. Order placed now, told to schedule past 2024. Orders:  -     DXA bone density spine hip and pelvis; Future; Expected date: 2023    7. Fatigue, unspecified type  Comments:  Get labs, will call with results and any further testing.   She is trying to get financial assistance to help pay for her colonoscopy/endoscopy. BMI Counseling: Body mass index is 30.8 kg/m². The BMI is above normal. Nutrition recommendations include decreasing portion sizes, encouraging healthy choices of fruits and vegetables, decreasing fast food intake, limiting drinks that contain sugar and increasing intake of lean protein. Exercise recommendations include exercising 3-5 times per week. Rationale for BMI follow-up plan is due to patient being overweight or obese. Subjective      HPI    Pt is here to establish care. Recent changed to [employer]. Finding that recommended tests such as a colonoscopy and endoscopy are not covered. 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 She has a hx of colon polyps. She has failed Colestid/questran, imodium, fiber, and bentyl doesn't help her cramping. She was started on rifaximin and started it last week. Pt does drink half-caff coffee, avoids spicy foods. Pt complaining of fatigue, nonspecific, over the past year. 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 Pt has not been on her diabetes medications for a year. She has CBC, CMP, and TSH ordered from GI about a month ago. Pt has not been on diabetes medications for over a year due to not being able to afford the Januvia. She didn't have the money at the time to pay for it. She would like to start it today. She was on metformin the past, but wishes to try the Januvia. She was concerned about kidney function with the metformin, but pt was reassured her kidney function is good enough to be on it. If Januvia is not covered, will switch back to metformin. BS log from home over the past couple of weeks shows she is running between 88 and 170 fasting before breakfast, most readings are in the 110s to 140s range. BP elevated today. Does not check her BP at home. Currently on metoprolol and valsartan. Does not see cardiology.   Also takes Lasix for a long time, since she lived in [place]. Started it because of edema in her legs. No current issues with edema at this time. Monitors weight. Looking at past records, her BP tends to run in the 534K-508M systolic. Requesting refills of creams for groin rash and valsartan today. Review of Systems   Constitutional:  Positive for fatigue. Negative for diaphoresis and fever. HENT: Negative. Respiratory:  Negative for cough and shortness of breath. Cardiovascular:  Negative for chest pain and leg swelling. Gastrointestinal:  Positive for diarrhea (chronic). Negative for abdominal distention, abdominal pain, nausea and vomiting. Endocrine: Negative for polydipsia, polyphagia and polyuria. Genitourinary: Negative. Musculoskeletal:  Negative for gait problem. Skin:  Negative for rash. Neurological:  Negative for syncope, facial asymmetry, light-headedness and headaches. All other systems reviewed and are negative.       Current Outpatient Medications on File Prior to Visit   Medication Sig   • atorvastatin (LIPITOR) 10 mg tablet TAKE ONE TABLET BY MOUTH ONCE A DAY   • fenofibrate 160 MG tablet Take 1 tablet (160 mg total) by mouth daily   • furosemide (LASIX) 20 mg tablet TAKE ONE TABLET BY MOUTH ONCE A DAY   • metoprolol succinate (TOPROL-XL) 25 mg 24 hr tablet TAKE ONE TABLET BY MOUTH ONCE A DAY   • omeprazole (PriLOSEC) 20 mg delayed release capsule Take 1 capsule (20 mg total) by mouth daily   • OneTouch Delica Lancets 57B MISC Check blood glucose daily   • Potassium Chloride ER 20 MEQ TBCR Take 1 tablet (20 mEq total) by mouth in the morning   • [DISCONTINUED] ergocalciferol (VITAMIN D2) 50,000 units Take 1 capsule (50,000 Units total) by mouth once a week for 12 doses   • [DISCONTINUED] ketoconazole (NIZORAL) 2 % cream Apply topically daily   • [DISCONTINUED] Misc Natural Products (OSTEO BI-FLEX ADV DOUBLE ST PO) Take by mouth   • [DISCONTINUED] nystatin (MYCOSTATIN) powder Apply topically 2 (two) times a day as needed (itching)   • [DISCONTINUED] sitaGLIPtin (JANUVIA) 100 mg tablet Take 1 tablet (100 mg total) by mouth daily   • [DISCONTINUED] valsartan (DIOVAN) 320 MG tablet TAKE ONE TABLET BY MOUTH EVERY DAY   • [DISCONTINUED] ASPIRIN 81 PO Take 81 mg by mouth daily  (Patient not taking: Reported on 12/1/2023)   • [DISCONTINUED] colestipol (COLESTID) 1 g tablet TAKE TWO TABLETS BY MOUTH THREE TIMES DAILY (Patient not taking: Reported on 11/15/2023)   • [DISCONTINUED] Loratadine 10 MG CAPS as needed  (Patient not taking: Reported on 12/1/2023)       Objective     /78 (BP Location: Right arm, Patient Position: Sitting, Cuff Size: Standard)   Pulse 75   Temp (!) 95 °F (35 °C) (Tympanic)   Resp 18   Wt 73.9 kg (163 lb)   SpO2 97%   BMI 30.80 kg/m²     Physical Exam  Vitals reviewed. Constitutional:       General: She is not in acute distress. Appearance: Normal appearance. She is obese. She is not toxic-appearing or diaphoretic. HENT:      Head: Normocephalic and atraumatic. Right Ear: Hearing normal.      Left Ear: Hearing normal.      Nose: Nose normal.      Mouth/Throat:      Lips: Pink. Mouth: Mucous membranes are moist.   Eyes:      General: Lids are normal. Vision grossly intact. No scleral icterus. Conjunctiva/sclera: Conjunctivae normal.   Cardiovascular:      Rate and Rhythm: Normal rate and regular rhythm. Heart sounds: Normal heart sounds. No murmur heard. Pulmonary:      Effort: Pulmonary effort is normal. No respiratory distress. Breath sounds: Normal breath sounds. Abdominal:      General: Bowel sounds are normal.      Palpations: Abdomen is soft. Tenderness: There is no abdominal tenderness. Musculoskeletal:      Cervical back: Full passive range of motion without pain. Right lower leg: No edema. Left lower leg: No edema. Skin:     General: Skin is warm and dry. Coloration: Skin is not cyanotic or jaundiced.    Neurological: General: No focal deficit present. Mental Status: She is alert and oriented to person, place, and time. Gait: Gait is intact.        Magdalena Quevedo PA-C

## 2023-12-04 ENCOUNTER — TELEPHONE (OUTPATIENT)
Age: 68
End: 2023-12-04

## 2023-12-04 ENCOUNTER — TELEPHONE (OUTPATIENT)
Dept: HEMATOLOGY ONCOLOGY | Facility: CLINIC | Age: 68
End: 2023-12-04

## 2023-12-04 DIAGNOSIS — D72.829 LEUKOCYTOSIS, UNSPECIFIED TYPE: Primary | ICD-10-CM

## 2023-12-04 DIAGNOSIS — K58.0 IRRITABLE BOWEL SYNDROME WITH DIARRHEA: ICD-10-CM

## 2023-12-04 RX ORDER — RIFAXIMIN 550 MG/1
TABLET ORAL
Qty: 42 TABLET | Refills: 0 | Status: SHIPPED | OUTPATIENT
Start: 2023-12-04 | End: 2023-12-18

## 2023-12-04 NOTE — TELEPHONE ENCOUNTER
Pt called stating she can't get into her mychart for the number to hematology. Pt was provided number.

## 2023-12-04 NOTE — TELEPHONE ENCOUNTER
----- Message from Estrella Mirza PA-C sent at 12/4/2023  9:40 AM EST -----  Please inform patient that her WBC count was mildly elevated, otherwise the labs were normal.  Upon review, her WBC count has been mildly elevated over the past couple of years. I would like her to have this evaluated by a Hematologist.  I placed a referral to Hematology.

## 2023-12-04 NOTE — TELEPHONE ENCOUNTER
Appointment Schedule   Who are you speaking with? Patient   If it is not the patient, are they listed on an active communication consent form? N/A   Which provider is the appointment scheduled with? Dr. Francisco Townsend   At which location is the appointment scheduled for? [facility]   When is the appointment scheduled? Please list date and time 1/3/24 @ 9   What is the reason for this appointment? Leukocytosis, unspecified type    Did patient voice understanding of the details of this appointment? Yes   Was the no show policy reviewed with patient?  Yes

## 2023-12-06 ENCOUNTER — TELEPHONE (OUTPATIENT)
Age: 68
End: 2023-12-06

## 2023-12-06 NOTE — TELEPHONE ENCOUNTER
----- Message from April Anel Avery PA-C sent at 12/6/2023  8:21 AM EST -----  Let the patient know that her A1c is OK at 6.2, she should continue with diet/exercise. Her magnesium is low, so I am putting in for a supplement she can take daily. When she comes back in six months we should recheck it. Common side effect of taking extra magnesium is looser stools. Her triglycerides are also high, but they have been high. She should avoid alcohol if she isn't already, watch red meat and things like butter, and exercising will help this. I saw that Luana Kiet looked at her other lab work and saw that her white blood cell count is elevated over the past few years and put in a referral to hematology for that to be evaluated.   Sos he should follow through with this referral.

## 2023-12-07 ENCOUNTER — TELEPHONE (OUTPATIENT)
Age: 68
End: 2023-12-07

## 2023-12-07 DIAGNOSIS — E83.42 HYPOMAGNESEMIA: Primary | ICD-10-CM

## 2023-12-07 DIAGNOSIS — B37.2 SKIN YEAST INFECTION: ICD-10-CM

## 2023-12-07 RX ORDER — NYSTATIN 100000 [USP'U]/G
POWDER TOPICAL 2 TIMES DAILY PRN
Qty: 60 G | Refills: 1 | Status: SHIPPED | OUTPATIENT
Start: 2023-12-07

## 2023-12-07 RX ORDER — MAGNESIUM L-LACTATE 84 MG
84 TABLET, EXTENDED RELEASE ORAL DAILY
Qty: 30 TABLET | Refills: 2 | Status: SHIPPED | OUTPATIENT
Start: 2023-12-07 | End: 2024-03-06

## 2023-12-07 RX ORDER — NYSTATIN 100000 [USP'U]/G
POWDER TOPICAL 2 TIMES DAILY PRN
Qty: 15 G | Refills: 0 | Status: SHIPPED | OUTPATIENT
Start: 2023-12-07 | End: 2023-12-07 | Stop reason: SDUPTHER

## 2023-12-11 ENCOUNTER — TELEPHONE (OUTPATIENT)
Age: 68
End: 2023-12-11

## 2023-12-11 NOTE — TELEPHONE ENCOUNTER
Patient contacting office stating she never received the financial assistance form that was to be sent out to her. Call made to the billing office, per billing office a case was made by the financial advisors for [facility] and that forms were sent out in November. Asked for forms to be resent for patient. Request for forms placed and phone number for financial advisors was given, [phone], Informed patient and gave financial advisors phone number. Patient understood and had no further questions or concerns.

## 2023-12-18 DIAGNOSIS — B35.9 TINEA: ICD-10-CM

## 2023-12-19 RX ORDER — KETOCONAZOLE 20 MG/G
CREAM TOPICAL DAILY
Qty: 60 G | Refills: 2 | Status: SHIPPED | OUTPATIENT
Start: 2023-12-19

## 2023-12-21 ENCOUNTER — TELEPHONE (OUTPATIENT)
Dept: HEMATOLOGY ONCOLOGY | Facility: CLINIC | Age: 68
End: 2023-12-21

## 2023-12-21 DIAGNOSIS — D72.829 LEUKOCYTOSIS, UNSPECIFIED TYPE: Primary | ICD-10-CM

## 2023-12-21 NOTE — TELEPHONE ENCOUNTER
I called and spoke to the patient in regards to rescheduling her appointment from 01/03/24 with Dr. Malhotra to 02/27/24 with Astrid. Patient was agreeable to this.

## 2024-01-02 ENCOUNTER — TELEPHONE (OUTPATIENT)
Dept: LAB | Facility: HOSPITAL | Age: 69
End: 2024-01-02

## 2024-01-04 ENCOUNTER — APPOINTMENT (OUTPATIENT)
Dept: LAB | Facility: HOSPITAL | Age: 69
End: 2024-01-04
Payer: COMMERCIAL

## 2024-01-04 DIAGNOSIS — N18.31 STAGE 3A CHRONIC KIDNEY DISEASE (HCC): ICD-10-CM

## 2024-01-04 DIAGNOSIS — E87.6 HYPOKALEMIA: ICD-10-CM

## 2024-01-04 DIAGNOSIS — D72.829 LEUKOCYTOSIS, UNSPECIFIED TYPE: ICD-10-CM

## 2024-01-04 DIAGNOSIS — Z13.29 SCREENING FOR THYROID DISORDER: ICD-10-CM

## 2024-01-04 DIAGNOSIS — E78.2 MIXED HYPERLIPIDEMIA: ICD-10-CM

## 2024-01-04 LAB
ALBUMIN SERPL BCP-MCNC: 4.2 G/DL (ref 3.5–5)
ALP SERPL-CCNC: 58 U/L (ref 34–104)
ALT SERPL W P-5'-P-CCNC: 11 U/L (ref 7–52)
ANION GAP SERPL CALCULATED.3IONS-SCNC: 9 MMOL/L
AST SERPL W P-5'-P-CCNC: 16 U/L (ref 13–39)
BASOPHILS # BLD AUTO: 0.07 THOUSANDS/ÂΜL (ref 0–0.1)
BASOPHILS NFR BLD AUTO: 1 % (ref 0–1)
BILIRUB SERPL-MCNC: 0.34 MG/DL (ref 0.2–1)
BUN SERPL-MCNC: 11 MG/DL (ref 5–25)
CALCIUM SERPL-MCNC: 9.4 MG/DL (ref 8.4–10.2)
CHLORIDE SERPL-SCNC: 107 MMOL/L (ref 96–108)
CHOLEST SERPL-MCNC: 148 MG/DL
CO2 SERPL-SCNC: 28 MMOL/L (ref 21–32)
CREAT SERPL-MCNC: 0.92 MG/DL (ref 0.6–1.3)
EOSINOPHIL # BLD AUTO: 0.34 THOUSAND/ÂΜL (ref 0–0.61)
EOSINOPHIL NFR BLD AUTO: 3 % (ref 0–6)
ERYTHROCYTE [DISTWIDTH] IN BLOOD BY AUTOMATED COUNT: 12.7 % (ref 11.6–15.1)
GFR SERPL CREATININE-BSD FRML MDRD: 64 ML/MIN/1.73SQ M
GLUCOSE P FAST SERPL-MCNC: 105 MG/DL (ref 65–99)
HCT VFR BLD AUTO: 38.9 % (ref 34.8–46.1)
HDLC SERPL-MCNC: 45 MG/DL
HGB BLD-MCNC: 12.8 G/DL (ref 11.5–15.4)
IMM GRANULOCYTES # BLD AUTO: 0.09 THOUSAND/UL (ref 0–0.2)
IMM GRANULOCYTES NFR BLD AUTO: 1 % (ref 0–2)
LDLC SERPL CALC-MCNC: 42 MG/DL (ref 0–100)
LYMPHOCYTES # BLD AUTO: 3.04 THOUSANDS/ÂΜL (ref 0.6–4.47)
LYMPHOCYTES NFR BLD AUTO: 28 % (ref 14–44)
MCH RBC QN AUTO: 31.3 PG (ref 26.8–34.3)
MCHC RBC AUTO-ENTMCNC: 32.9 G/DL (ref 31.4–37.4)
MCV RBC AUTO: 95 FL (ref 82–98)
MONOCYTES # BLD AUTO: 0.85 THOUSAND/ÂΜL (ref 0.17–1.22)
MONOCYTES NFR BLD AUTO: 8 % (ref 4–12)
NEUTROPHILS # BLD AUTO: 6.53 THOUSANDS/ÂΜL (ref 1.85–7.62)
NEUTS SEG NFR BLD AUTO: 59 % (ref 43–75)
NONHDLC SERPL-MCNC: 103 MG/DL
NRBC BLD AUTO-RTO: 0 /100 WBCS
PLATELET # BLD AUTO: 314 THOUSANDS/UL (ref 149–390)
PMV BLD AUTO: 10.4 FL (ref 8.9–12.7)
POTASSIUM SERPL-SCNC: 3.9 MMOL/L (ref 3.5–5.3)
PROT SERPL-MCNC: 6.7 G/DL (ref 6.4–8.4)
RBC # BLD AUTO: 4.09 MILLION/UL (ref 3.81–5.12)
SODIUM SERPL-SCNC: 144 MMOL/L (ref 135–147)
TRIGL SERPL-MCNC: 305 MG/DL
TSH SERPL DL<=0.05 MIU/L-ACNC: 2.58 UIU/ML (ref 0.45–4.5)
WBC # BLD AUTO: 10.92 THOUSAND/UL (ref 4.31–10.16)

## 2024-01-04 PROCEDURE — 80053 COMPREHEN METABOLIC PANEL: CPT

## 2024-01-04 PROCEDURE — 36415 COLL VENOUS BLD VENIPUNCTURE: CPT

## 2024-01-04 PROCEDURE — 84443 ASSAY THYROID STIM HORMONE: CPT

## 2024-01-04 PROCEDURE — 80061 LIPID PANEL: CPT

## 2024-01-04 PROCEDURE — 85025 COMPLETE CBC W/AUTO DIFF WBC: CPT

## 2024-01-05 ENCOUNTER — TELEPHONE (OUTPATIENT)
Age: 69
End: 2024-01-05

## 2024-01-05 NOTE — TELEPHONE ENCOUNTER
Patients GI provider:  Dr. Cota / [facility]    Number to return call: [phone]    Reason for call: Pt calling for her lab results    Scheduled procedure/appointment date if applicable: n/a

## 2024-01-08 ENCOUNTER — TELEPHONE (OUTPATIENT)
Dept: HEMATOLOGY ONCOLOGY | Facility: CLINIC | Age: 69
End: 2024-01-08

## 2024-01-08 NOTE — TELEPHONE ENCOUNTER
Patient called back and stated she is not able to review results through my chart, she asked if we can please giver her a call to go over them, please review and reach out thank you

## 2024-01-08 NOTE — TELEPHONE ENCOUNTER
Appointment Confirmation   Who are you speaking with? Patient   If it is not the patient, are they listed on an active communication consent form? N/A   Which provider is the appointment scheduled with?  Astrid Lane    When is the appointment scheduled?  Please list date and time 02/27/2024 @11AM    At which location is the appointment scheduled to take place? [place]   Did caller verbalize understanding of appointment details? Yes

## 2024-01-11 DIAGNOSIS — I10 PRIMARY HYPERTENSION: ICD-10-CM

## 2024-01-11 RX ORDER — POTASSIUM CHLORIDE 1500 MG/1
1 TABLET, EXTENDED RELEASE ORAL DAILY
Qty: 90 TABLET | Refills: 0 | Status: SHIPPED | OUTPATIENT
Start: 2024-01-11

## 2024-01-12 NOTE — TELEPHONE ENCOUNTER
Pt called in and said she does not understand what was talked about with her blood test results and is concerned that her appt with her hematologist is far away and she would like a call back to discuss her blood results further

## 2024-01-12 NOTE — TELEPHONE ENCOUNTER
Spoke to pt let her know results again and that her visit to hematology is ok. She stated that she called office to be placed on a cancellation list in case an appt comes sooner.

## 2024-01-28 DIAGNOSIS — I10 PRIMARY HYPERTENSION: ICD-10-CM

## 2024-01-28 DIAGNOSIS — R12 HEARTBURN: ICD-10-CM

## 2024-01-29 RX ORDER — METOPROLOL SUCCINATE 25 MG/1
25 TABLET, EXTENDED RELEASE ORAL DAILY
Qty: 90 TABLET | Refills: 0 | Status: SHIPPED | OUTPATIENT
Start: 2024-01-29

## 2024-01-29 RX ORDER — OMEPRAZOLE 20 MG/1
20 CAPSULE, DELAYED RELEASE ORAL DAILY
Qty: 90 CAPSULE | Refills: 0 | Status: SHIPPED | OUTPATIENT
Start: 2024-01-29

## 2024-02-20 DIAGNOSIS — I10 PRIMARY HYPERTENSION: ICD-10-CM

## 2024-02-20 DIAGNOSIS — E78.2 MIXED HYPERLIPIDEMIA: ICD-10-CM

## 2024-02-20 DIAGNOSIS — R12 HEARTBURN: ICD-10-CM

## 2024-02-20 RX ORDER — METOPROLOL SUCCINATE 25 MG/1
25 TABLET, EXTENDED RELEASE ORAL DAILY
Qty: 90 TABLET | Refills: 0 | Status: SHIPPED | OUTPATIENT
Start: 2024-02-20

## 2024-02-20 RX ORDER — FENOFIBRATE 160 MG/1
160 TABLET ORAL DAILY
Qty: 90 TABLET | Refills: 3 | Status: SHIPPED | OUTPATIENT
Start: 2024-02-20 | End: 2025-02-19

## 2024-02-20 RX ORDER — OMEPRAZOLE 20 MG/1
20 CAPSULE, DELAYED RELEASE ORAL DAILY
Qty: 90 CAPSULE | Refills: 0 | Status: SHIPPED | OUTPATIENT
Start: 2024-02-20

## 2024-02-22 ENCOUNTER — TELEPHONE (OUTPATIENT)
Dept: HEMATOLOGY ONCOLOGY | Facility: CLINIC | Age: 69
End: 2024-02-22

## 2024-02-22 NOTE — TELEPHONE ENCOUNTER
Appointment Schedule   Who are you speaking with? Patient   If it is not the patient, are they listed on an active communication consent form? N/A   Which provider is the appointment scheduled with? Astrid Lane    At which location is the appointment scheduled for? [facility]   When is the appointment scheduled?  Please list date and time 4/15/24 9:00am   What is the reason for this appointment? New patient consult   Did patient voice understanding of the details of this appointment? Yes   Was the no show policy reviewed with patient? Yes

## 2024-03-28 DIAGNOSIS — E11.9 TYPE 2 DIABETES MELLITUS WITHOUT COMPLICATION, WITHOUT LONG-TERM CURRENT USE OF INSULIN (HCC): ICD-10-CM

## 2024-04-01 ENCOUNTER — TELEPHONE (OUTPATIENT)
Dept: HEMATOLOGY ONCOLOGY | Facility: CLINIC | Age: 69
End: 2024-04-01

## 2024-04-01 DIAGNOSIS — I10 PRIMARY HYPERTENSION: ICD-10-CM

## 2024-04-01 NOTE — TELEPHONE ENCOUNTER
Appointment Change  Cancel, Reschedule, Change to Virtual      Who are you speaking with? Patient   If it is not the patient, is the caller listed on the communication consent form? N/A   Which provider is the appointment scheduled with? Astrid Lane    When was the original appointment scheduled?    Please list date and time 04/15/2024 @ 9AM    At which location is the appointment scheduled to take place? [place]   Was the appointment rescheduled?     Was the appointment changed from an in person visit to a virtual visit?    If so, please list the details of the change. Yes, 05/23/2024 @ 11AM    What is the reason for the appointment change? provider

## 2024-04-12 DIAGNOSIS — E78.2 MIXED HYPERLIPIDEMIA: ICD-10-CM

## 2024-04-12 DIAGNOSIS — I10 PRIMARY HYPERTENSION: ICD-10-CM

## 2024-04-12 DIAGNOSIS — R73.03 PREDIABETES: ICD-10-CM

## 2024-04-12 RX ORDER — POTASSIUM CHLORIDE 1500 MG/1
1 TABLET, EXTENDED RELEASE ORAL DAILY
Qty: 90 TABLET | Refills: 0 | Status: SHIPPED | OUTPATIENT
Start: 2024-04-12

## 2024-04-12 RX ORDER — ATORVASTATIN CALCIUM 10 MG/1
10 TABLET, FILM COATED ORAL DAILY
Qty: 90 TABLET | Refills: 0 | Status: SHIPPED | OUTPATIENT
Start: 2024-04-12

## 2024-04-12 RX ORDER — BLOOD SUGAR DIAGNOSTIC
STRIP MISCELLANEOUS
Qty: 100 EACH | Refills: 3 | Status: SHIPPED | OUTPATIENT
Start: 2024-04-12

## 2024-04-12 RX ORDER — LANCETS 33 GAUGE
EACH MISCELLANEOUS
Qty: 100 EACH | Refills: 3 | Status: SHIPPED | OUTPATIENT
Start: 2024-04-12

## 2024-05-06 ENCOUNTER — VBI (OUTPATIENT)
Dept: ADMINISTRATIVE | Facility: OTHER | Age: 69
End: 2024-05-06

## 2024-05-06 DIAGNOSIS — E83.42 HYPOMAGNESEMIA: ICD-10-CM

## 2024-05-06 DIAGNOSIS — I10 ESSENTIAL HYPERTENSION: ICD-10-CM

## 2024-05-06 DIAGNOSIS — R12 HEARTBURN: ICD-10-CM

## 2024-05-06 DIAGNOSIS — I10 PRIMARY HYPERTENSION: ICD-10-CM

## 2024-05-06 RX ORDER — MAGNESIUM L-LACTATE 84 MG
84 TABLET, EXTENDED RELEASE ORAL DAILY
Qty: 90 TABLET | Refills: 1 | Status: SHIPPED | OUTPATIENT
Start: 2024-05-06 | End: 2024-11-02

## 2024-05-06 RX ORDER — FUROSEMIDE 20 MG/1
20 TABLET ORAL DAILY
Qty: 90 TABLET | Refills: 1 | Status: SHIPPED | OUTPATIENT
Start: 2024-05-06

## 2024-05-06 NOTE — TELEPHONE ENCOUNTER
05/06/24 1:48 PM     VB [employer] SmartForm used to document caregap status.    Alla Sheikh    ICU PROGRESS NOTE              Subjective  Off oxygen at the time of my evaluation with 93% saturation on room air  ALLERGIES:  Patient has no known allergies.    Medications    Current Facility-Administered Medications   Medication Dose Route Frequency Provider Last Rate Last Admin   • sodium citrate anticoagulant 4 % flush 3 mL  3 mL Intracatheter PRN Jv Brewster MD       • sodium chloride (NORMAL SALINE) 0.9 % bolus 100-200 mL  100-200 mL Intravenous PRN Jv Brewster MD       • potassium CHLORIDE (KLOR-CON) packet 20 mEq  20 mEq Oral Daily with breakfast Alcides Lancaster   20 mEq at 11/19/21 0935   • polyvinyl alcohol (LIQUIFILM TEARS) 1.4 % ophthalmic solution 1 drop  1 drop Both Eyes Daily Alcides Lancaster   1 drop at 11/18/21 1302   • erythromycin ophthalmic ointment   Both Eyes QHS Alcides Lancaster   Given at 11/18/21 2133   • micafungin (MYCAMINE) 100 mg in sodium chloride 0.9 % 100 mL IVPB  100 mg Intravenous Q24H Sheree Willis  mL/hr at 11/19/21 1512 100 mg at 11/19/21 1512   • midodrine (PROAMATINE) tablet 10 mg  10 mg Oral TID PRN Rosie Baldwin MD       • pantoprazole (PROTONIX) EC tablet 40 mg  40 mg Oral 2 times per day Daniella Oleary MD   40 mg at 11/19/21 0935   • folic acid (FOLATE) tablet 1 mg  1 mg Oral Daily Daniella Oleary MD   1 mg at 11/19/21 0935   • insulin glargine (LANTUS) injection 7 Units  7 Units Subcutaneous Nightly Conchita Terrazas MD   7 Units at 11/18/21 2132   • insulin lispro (ADMELOG,HumaLOG) - Correction Dose   Subcutaneous TID WC Conchita Terrazas MD       • insulin lispro (ADMELOG,HumaLOG) - Correction Dose   Subcutaneous Nightly Conchita Terrazas MD       • acetaminophen (TYLENOL) 160 MG/5ML solution 650 mg  650 mg Oral Q6H PRN Daniella Oleary MD       • atorvastatin (LIPITOR) tablet 40 mg  40 mg Oral Nightly Daniella Oleary MD   40 mg at 11/18/21 2126   • fenofibrate (TRICOR) tablet 54 mg  54 mg Oral Daily Daniella Oleary MD   54 mg at 11/19/21 0939   • sucralfate (CARAFATE) 1  GM/10ML suspension 1 g  1 g Oral 4x Daily AC & HS Daniella Oleary MD   1 g at 11/19/21 1513   • melatonin tablet 6 mg  6 mg Oral Nightly PRN Daniella Oleary MD       • thiamine (VITAMIN B1) tablet 100 mg  100 mg Oral Daily Daniella Oleary MD   100 mg at 11/19/21 0935   • piperacillin-tazobactam (ZOSYN) 3.375 g in sodium chloride 0.9 % 100 mL IVPB  3.375 g Intravenous Q12H Sheree Willis MD 25 mL/hr at 11/19/21 0423 3.375 g at 11/19/21 0423   • sodium chloride (NORMAL SALINE) 0.9 % bolus 100-200 mL  100-200 mL Intravenous PRN Jv Brewster MD       • dextrose 50 % injection 25 g  25 g Intravenous PRN Rosie Baldwin MD       • dextrose 50 % injection 12.5 g  12.5 g Intravenous PRN Rosie Baldwin MD       • nystatin (MYCOSTATIN) powder   Topical 2 times per day Ada Lubin MD   Given at 11/16/21 2241   • sodium chloride (NORMAL SALINE) 0.9 % bolus 100-200 mL  100-200 mL Intravenous PRN Jv Brewster MD                 Last Recorded Vitals  Vitals with min/max:      Vital Last Value 24 Hour Range   Temperature (P) 97.9 °F (36.6 °C) (11/19/21 1745) Temp  Min: 97.2 °F (36.2 °C)  Max: 98.6 °F (37 °C)   Pulse (!) 101 (11/19/21 1815) Pulse  Min: 66  Max: 107   Respiratory (P) 20 (11/19/21 1815) Resp  Min: 17  Max: 21   Non-Invasive  Blood Pressure 120/81 (11/19/21 1815) BP  Min: 115/78  Max: 133/80   Pulse Oximetry (P) 97 % (11/19/21 1815) SpO2  Min: 93 %  Max: 98 %   Arterial   Blood Pressure   No data recorded      I/O last 3 completed shifts:  In: 236 [P.O.:236]  Out: -   No intake/output data recorded.          Physical Exam    no JVD.  No lymphadenopathy  Chest diminished breath sounds bilaterally.  No rhonchi  CVS S1-S2 present  Abdomen soft nontender bowel sounds are present  Extremities trace edema  Labs   CBC  Recent Labs   Lab 11/19/21  0949 11/19/21  0423 11/18/21  0404 11/17/21  0603 11/17/21  0603   WBC  --  9.4 9.4  --  11.2*   RBC  --  2.49* 2.71*  --  2.63*   HGB 7.0* 7.2* 7.8*   < > 7.7*   HCT   --  22.7* 24.7*  --  24.0*   MCV  --  91.2 91.1  --  91.3   PLT  --  352 381  --  365   Lymphocytes, Percent  --  16 14  --  11    < > = values in this interval not displayed.     CMP  Recent Labs   Lab 11/19/21 0422 11/18/21  0404 11/17/21  0603   SODIUM 137 139 143   POTASSIUM 3.3* 3.1* 3.4   CHLORIDE 102 101 102   CO2 23 24 24   BUN 72* 64* 52*   CREATININE 8.31* 7.43* 6.07*   GLUCOSE 116* 121* 87   CALCIUM 8.5 8.4 8.1*     Recent Labs   Lab 11/19/21  0422 11/18/21  0404 11/17/21  0603   AST 43* 50* 57*   GPT 42 40 38   ALKPT 171* 198* 216*   BILIRUBIN 0.9 0.9 1.1*   ALBUMIN 1.7* 1.6* 1.5*     Recent Labs   Lab 11/15/21  0950   PT 12.0*   PTT 30   INR 1.1       NT-PRONBP: No results for input(s): NTPROB in the last 8765 hours.  Troponin: No results found  Interleukin 2 Receptor 175.3 - 858.2 pg/mL 6709.2 High         UA  Lab Results   Component Value Date    UWBC Moderate (A) 11/18/2021    URBC Large (A) 11/18/2021        No components found for: PROCALCITONIN    Lab Results   Component Value Date    DDIMER >35.20 (H) 11/04/2021       Lab Results   Component Value Date    RAPH 7.41 11/15/2021    RAPCO2 37 11/15/2021    RAPO2 108 11/15/2021    RAHCO3 24 11/15/2021    RASAT 99 11/15/2021    RFIO2 50 11/15/2021       Microbiology  Microbiology Results  (Last 10 results in the past 7 days)    Specimen   Gram Smear   Culture Result   Status       11/13/21  0531         Adequate quality specimen. Acute inflammation with moderate/many neutrophils. Mixed bacteria with no predominant type.                   Imaging  No new imaging studies today    Assessment and Plan  Status post cardiac arrest  ARDS-resolving  Diabetes  Renal failure  Fungemia now  Anemia  Alcohol abuse  Thrombocytopenia has resolved  Extremely high IL-2 receptor levels  Elevated lipase and triglyceride levels  ID following.  Respiratory status is stable now.    DVT Prophylaxis        Code Status    Code Status: Full Resuscitation      Ajay Bradley  MD  11/19/2021 6:25 PM

## 2024-05-06 NOTE — TELEPHONE ENCOUNTER
Medication: furosemide (LASIX) 20 mg tablet   magnesium (MAGTAB) 84 MG (7MEQ) TBCR     Dose/Frequency: 20 MG (lasik) 84MG (MAGTAB)    Quantity: Pt request if she can have 90 tablets for both medications    Pharmacy: [facility] [id number]     Office:   [x] PCP/Provider -   [] Speciality/Provider -     Does the patient have enough for 3 days?   [x] Yes   [] No - Send as HP to POD

## 2024-05-07 RX ORDER — METOPROLOL SUCCINATE 25 MG/1
25 TABLET, EXTENDED RELEASE ORAL DAILY
Qty: 90 TABLET | Refills: 1 | Status: SHIPPED | OUTPATIENT
Start: 2024-05-07

## 2024-05-07 RX ORDER — OMEPRAZOLE 20 MG/1
20 CAPSULE, DELAYED RELEASE ORAL DAILY
Qty: 90 CAPSULE | Refills: 1 | Status: SHIPPED | OUTPATIENT
Start: 2024-05-07

## 2024-05-20 ENCOUNTER — TELEPHONE (OUTPATIENT)
Dept: HEMATOLOGY ONCOLOGY | Facility: CLINIC | Age: 69
End: 2024-05-20

## 2024-05-20 NOTE — TELEPHONE ENCOUNTER
Appointment Schedule   Who are you speaking with? Patient   If it is not the patient, are they listed on an active communication consent form? N/A   Which provider is the appointment scheduled with? Astrid Lane    At which location is the appointment scheduled for? [facility]   When is the appointment scheduled?  Please list date and time 79/9/24 9am   What is the reason for this appointment? consult   Did patient voice understanding of the details of this appointment? Yes   Was the no show policy reviewed with patient? Yes

## 2024-06-30 RX ORDER — POTASSIUM CHLORIDE 1500 MG/1
1 TABLET, EXTENDED RELEASE ORAL DAILY
Qty: 90 TABLET | Refills: 0 | OUTPATIENT
Start: 2024-06-30

## 2024-07-15 DIAGNOSIS — E78.2 MIXED HYPERLIPIDEMIA: ICD-10-CM

## 2024-07-15 DIAGNOSIS — I10 PRIMARY HYPERTENSION: ICD-10-CM

## 2024-07-15 RX ORDER — POTASSIUM CHLORIDE 1500 MG/1
1 TABLET, EXTENDED RELEASE ORAL DAILY
Qty: 30 TABLET | Refills: 0 | Status: SHIPPED | OUTPATIENT
Start: 2024-07-15

## 2024-07-15 RX ORDER — ATORVASTATIN CALCIUM 10 MG/1
10 TABLET, FILM COATED ORAL DAILY
Qty: 30 TABLET | Refills: 0 | Status: SHIPPED | OUTPATIENT
Start: 2024-07-15

## 2024-07-17 DIAGNOSIS — I10 ESSENTIAL HYPERTENSION: ICD-10-CM

## 2024-07-17 DIAGNOSIS — I10 PRIMARY HYPERTENSION: ICD-10-CM

## 2024-07-17 RX ORDER — METOPROLOL SUCCINATE 25 MG/1
25 TABLET, EXTENDED RELEASE ORAL DAILY
Qty: 90 TABLET | Refills: 1 | Status: SHIPPED | OUTPATIENT
Start: 2024-07-17

## 2024-07-17 RX ORDER — FUROSEMIDE 20 MG/1
20 TABLET ORAL DAILY
Qty: 90 TABLET | Refills: 1 | Status: SHIPPED | OUTPATIENT
Start: 2024-07-17

## 2024-07-17 NOTE — TELEPHONE ENCOUNTER
Reason for call:   [x] Refill   [] Prior Auth  [] Other:     Office:   [x] PCP/Provider -  Magdalena Mckoen PA-C   [] Specialty/Provider -         Does the patient have enough for 3 days?   [] Yes   [x] No - Send as HP to POD

## 2024-08-20 ENCOUNTER — TELEPHONE (OUTPATIENT)
Age: 69
End: 2024-08-20

## 2024-08-20 NOTE — TELEPHONE ENCOUNTER
PT called in requesting to speak with Samreen in regards to med refill for: Oxycodone that it was not to go to [facility], it was to go to [facility].     Warm transferred to Samreen for further assistance.

## 2024-08-27 DIAGNOSIS — E78.2 MIXED HYPERLIPIDEMIA: ICD-10-CM

## 2024-08-27 DIAGNOSIS — I10 PRIMARY HYPERTENSION: ICD-10-CM

## 2024-08-27 DIAGNOSIS — E11.9 TYPE 2 DIABETES MELLITUS WITHOUT COMPLICATION, WITHOUT LONG-TERM CURRENT USE OF INSULIN (HCC): ICD-10-CM

## 2024-08-27 DIAGNOSIS — I10 ESSENTIAL HYPERTENSION: ICD-10-CM

## 2024-08-27 DIAGNOSIS — B35.9 TINEA: ICD-10-CM

## 2024-08-27 DIAGNOSIS — E83.42 HYPOMAGNESEMIA: ICD-10-CM

## 2024-08-27 DIAGNOSIS — R12 HEARTBURN: ICD-10-CM

## 2024-08-27 RX ORDER — POTASSIUM CHLORIDE 1500 MG/1
1 TABLET, EXTENDED RELEASE ORAL DAILY
Qty: 100 TABLET | Refills: 0 | Status: SHIPPED | OUTPATIENT
Start: 2024-08-27

## 2024-08-27 RX ORDER — MAGNESIUM L-LACTATE 84 MG
84 TABLET, EXTENDED RELEASE ORAL DAILY
Qty: 100 TABLET | Refills: 0 | Status: SHIPPED | OUTPATIENT
Start: 2024-08-27 | End: 2024-12-05

## 2024-08-27 RX ORDER — FENOFIBRATE 160 MG/1
160 TABLET ORAL DAILY
Qty: 100 TABLET | Refills: 0 | Status: SHIPPED | OUTPATIENT
Start: 2024-08-27 | End: 2024-12-05

## 2024-08-27 RX ORDER — VALSARTAN 320 MG/1
320 TABLET ORAL DAILY
Qty: 100 TABLET | Refills: 0 | Status: SHIPPED | OUTPATIENT
Start: 2024-08-27 | End: 2024-12-05

## 2024-08-27 RX ORDER — FUROSEMIDE 20 MG
20 TABLET ORAL DAILY
Qty: 100 TABLET | Refills: 0 | Status: SHIPPED | OUTPATIENT
Start: 2024-08-27

## 2024-08-27 RX ORDER — METOPROLOL SUCCINATE 25 MG/1
25 TABLET, EXTENDED RELEASE ORAL DAILY
Qty: 90 TABLET | Refills: 1 | Status: SHIPPED | OUTPATIENT
Start: 2024-08-27

## 2024-08-27 RX ORDER — ATORVASTATIN CALCIUM 10 MG/1
10 TABLET, FILM COATED ORAL DAILY
Qty: 100 TABLET | Refills: 0 | Status: SHIPPED | OUTPATIENT
Start: 2024-08-27

## 2024-08-27 NOTE — TELEPHONE ENCOUNTER
Medication: as listed    Dose/Frequency: as listed    Quantity: previous amounts     Pharmacy: [id number] - [place], [place] - [address]      Office:   [x] PCP/Provider -   [] Speciality/Provider -     Does the patient have enough for 3 days?   [] Yes   [x] No - Send as HP to POD

## 2024-08-28 RX ORDER — KETOCONAZOLE 20 MG/G
CREAM TOPICAL DAILY
Qty: 60 G | Refills: 1 | Status: SHIPPED | OUTPATIENT
Start: 2024-08-28

## 2024-09-09 DIAGNOSIS — E83.42 HYPOMAGNESEMIA: ICD-10-CM

## 2024-09-09 RX ORDER — MAGNESIUM L-LACTATE 84 MG
84 TABLET, EXTENDED RELEASE ORAL DAILY
Qty: 100 TABLET | Refills: 1 | Status: SHIPPED | OUTPATIENT
Start: 2024-09-09 | End: 2024-09-11 | Stop reason: SDUPTHER

## 2024-09-09 NOTE — TELEPHONE ENCOUNTER
Requested Prescriptions     Pending Prescriptions Disp Refills    magnesium (MAGTAB) 84 MG (7MEQ) TBCR 100 tablet 0     Sig: Take 1 tablet (84 mg total) by mouth daily     [id number] [place], [place] - [address] [place] [address] [phone]       Chetna Thorndike: [phone]

## 2024-09-11 ENCOUNTER — TELEPHONE (OUTPATIENT)
Dept: LAB | Facility: HOSPITAL | Age: 69
End: 2024-09-11

## 2024-09-11 ENCOUNTER — OFFICE VISIT (OUTPATIENT)
Age: 69
End: 2024-09-11
Payer: COMMERCIAL

## 2024-09-11 VITALS
HEART RATE: 74 BPM | WEIGHT: 152 LBS | BODY MASS INDEX: 29.84 KG/M2 | DIASTOLIC BLOOD PRESSURE: 60 MMHG | HEIGHT: 60 IN | RESPIRATION RATE: 16 BRPM | TEMPERATURE: 97.1 F | OXYGEN SATURATION: 96 % | SYSTOLIC BLOOD PRESSURE: 148 MMHG

## 2024-09-11 DIAGNOSIS — I10 PRIMARY HYPERTENSION: ICD-10-CM

## 2024-09-11 DIAGNOSIS — F32.0 CURRENT MILD EPISODE OF MAJOR DEPRESSIVE DISORDER, UNSPECIFIED WHETHER RECURRENT (HCC): ICD-10-CM

## 2024-09-11 DIAGNOSIS — N18.31 TYPE 2 DIABETES MELLITUS WITH STAGE 3A CHRONIC KIDNEY DISEASE, WITHOUT LONG-TERM CURRENT USE OF INSULIN (HCC): ICD-10-CM

## 2024-09-11 DIAGNOSIS — Z12.31 ENCOUNTER FOR SCREENING MAMMOGRAM FOR BREAST CANCER: ICD-10-CM

## 2024-09-11 DIAGNOSIS — Z11.59 NEED FOR HEPATITIS C SCREENING TEST: ICD-10-CM

## 2024-09-11 DIAGNOSIS — Z00.00 MEDICARE ANNUAL WELLNESS VISIT, SUBSEQUENT: Primary | ICD-10-CM

## 2024-09-11 DIAGNOSIS — B35.9 TINEA: ICD-10-CM

## 2024-09-11 DIAGNOSIS — E11.22 TYPE 2 DIABETES MELLITUS WITH STAGE 3A CHRONIC KIDNEY DISEASE, WITHOUT LONG-TERM CURRENT USE OF INSULIN (HCC): ICD-10-CM

## 2024-09-11 DIAGNOSIS — E83.42 HYPOMAGNESEMIA: ICD-10-CM

## 2024-09-11 DIAGNOSIS — E78.2 MIXED HYPERLIPIDEMIA: ICD-10-CM

## 2024-09-11 DIAGNOSIS — F17.210 SMOKING GREATER THAN 20 PACK YEARS: ICD-10-CM

## 2024-09-11 DIAGNOSIS — R12 HEARTBURN: ICD-10-CM

## 2024-09-11 LAB — SL AMB POCT HEMOGLOBIN AIC: 6.1 (ref ?–6.5)

## 2024-09-11 PROCEDURE — G0439 PPPS, SUBSEQ VISIT: HCPCS

## 2024-09-11 PROCEDURE — 83036 HEMOGLOBIN GLYCOSYLATED A1C: CPT

## 2024-09-11 RX ORDER — FUROSEMIDE 20 MG
20 TABLET ORAL DAILY
Qty: 100 TABLET | Refills: 0 | Status: SHIPPED | OUTPATIENT
Start: 2024-09-11

## 2024-09-11 RX ORDER — POTASSIUM CHLORIDE 1500 MG/1
1 TABLET, EXTENDED RELEASE ORAL DAILY
Qty: 100 TABLET | Refills: 0 | Status: SHIPPED | OUTPATIENT
Start: 2024-09-11

## 2024-09-11 RX ORDER — KETOCONAZOLE 20 MG/G
CREAM TOPICAL DAILY
Qty: 60 G | Refills: 1 | Status: SHIPPED | OUTPATIENT
Start: 2024-09-11

## 2024-09-11 RX ORDER — METOPROLOL SUCCINATE 25 MG/1
25 TABLET, EXTENDED RELEASE ORAL DAILY
Qty: 90 TABLET | Refills: 1 | Status: SHIPPED | OUTPATIENT
Start: 2024-09-11

## 2024-09-11 RX ORDER — FENOFIBRATE 160 MG/1
160 TABLET ORAL DAILY
Qty: 100 TABLET | Refills: 0 | Status: SHIPPED | OUTPATIENT
Start: 2024-09-11 | End: 2024-12-20

## 2024-09-11 RX ORDER — VALSARTAN 320 MG/1
320 TABLET ORAL DAILY
Qty: 100 TABLET | Refills: 0 | Status: SHIPPED | OUTPATIENT
Start: 2024-09-11 | End: 2024-12-20

## 2024-09-11 RX ORDER — MAGNESIUM L-LACTATE 84 MG
84 TABLET, EXTENDED RELEASE ORAL DAILY
Qty: 100 TABLET | Refills: 1 | Status: SHIPPED | OUTPATIENT
Start: 2024-09-11 | End: 2025-03-30

## 2024-09-11 RX ORDER — ATORVASTATIN CALCIUM 10 MG/1
10 TABLET, FILM COATED ORAL DAILY
Qty: 100 TABLET | Refills: 0 | Status: SHIPPED | OUTPATIENT
Start: 2024-09-11 | End: 2024-09-19

## 2024-09-11 NOTE — ASSESSMENT & PLAN NOTE
Well controlled, needs refill.  Orders:    ketoconazole (NIZORAL) 2 % cream; Apply topically daily

## 2024-09-11 NOTE — ASSESSMENT & PLAN NOTE
Orders:    CBC and differential; Future    Comprehensive metabolic panel; Future    furosemide (LASIX) 20 mg tablet; Take 1 tablet (20 mg total) by mouth daily    metoprolol succinate (TOPROL-XL) 25 mg 24 hr tablet; Take 1 tablet (25 mg total) by mouth daily    Potassium Chloride ER 20 MEQ TBCR; Take 1 tablet (20 mEq total) by mouth daily    valsartan (DIOVAN) 320 MG tablet; Take 1 tablet (320 mg total) by mouth daily

## 2024-09-11 NOTE — ASSESSMENT & PLAN NOTE
Lab Results   Component Value Date    EGFR 64 01/04/2024    EGFR 71 12/01/2023    EGFR 67 12/12/2022    CREATININE 0.92 01/04/2024    CREATININE 0.84 12/01/2023    CREATININE 0.89 12/12/2022

## 2024-09-11 NOTE — ASSESSMENT & PLAN NOTE
Lab Results   Component Value Date    HGBA1C 6.1 09/11/2024     Well controlled, continue on Januvia and metformin.    Orders:    POCT hemoglobin A1c    Albumin / creatinine urine ratio; Future    sitaGLIPtin (JANUVIA) 100 mg tablet; Take 1 tablet (100 mg total) by mouth daily

## 2024-09-11 NOTE — ASSESSMENT & PLAN NOTE
Situation in nature.  Not currently on medications.  Pt encouraged to discuss with her nieces about visiting facilities near them in [place] to have discussions about the sister going into LTC.  Support and reassurance provided.

## 2024-09-11 NOTE — ASSESSMENT & PLAN NOTE
Lab Results   Component Value Date    HGBA1C 6.1 09/11/2024       Orders:    POCT hemoglobin A1c    Albumin / creatinine urine ratio; Future    sitaGLIPtin (JANUVIA) 100 mg tablet; Take 1 tablet (100 mg total) by mouth daily

## 2024-09-11 NOTE — ASSESSMENT & PLAN NOTE
Elevated today.  Pt here with her sister and is mildly upset during appointment.  Will leave medications the same for now, but pt encouraged to work on her stress levels.    Orders:    CBC and differential; Future    Comprehensive metabolic panel; Future    furosemide (LASIX) 20 mg tablet; Take 1 tablet (20 mg total) by mouth daily    metoprolol succinate (TOPROL-XL) 25 mg 24 hr tablet; Take 1 tablet (25 mg total) by mouth daily    Potassium Chloride ER 20 MEQ TBCR; Take 1 tablet (20 mEq total) by mouth daily    valsartan (DIOVAN) 320 MG tablet; Take 1 tablet (320 mg total) by mouth daily
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
## 2024-09-11 NOTE — ASSESSMENT & PLAN NOTE
Lab Results   Component Value Date    CHOLESTEROL 148 01/04/2024    CHOLESTEROL 168 12/01/2023    CHOLESTEROL 116 12/12/2022     Lab Results   Component Value Date    HDL 45 (L) 01/04/2024    HDL 48 (L) 12/01/2023    HDL 40 (L) 12/12/2022     Lab Results   Component Value Date    TRIG 305 (H) 01/04/2024    TRIG 281 (H) 12/01/2023    TRIG 202 (H) 12/12/2022     Lab Results   Component Value Date    NONHDLC 103 01/04/2024    NONHDLC 76 12/12/2022    NONHDLC 72 09/28/2020     Pt encouraged to work on diet and weight loss.  Her ASCVD risk score is very high.  Pt also encouraged to stop smoking.    The 10-year ASCVD risk score (Kristal FIELDS, et al., 2019) is: 39.3%    Values used to calculate the score:      Age: 69 years      Sex: Female      Is Non- : No      Diabetic: Yes      Tobacco smoker: Yes      Systolic Blood Pressure: 148 mmHg      Is BP treated: Yes      HDL Cholesterol: 45 mg/dL      Total Cholesterol: 148 mg/dL    Orders:    Lipid Panel with Direct LDL reflex; Future    atorvastatin (LIPITOR) 10 mg tablet; Take 1 tablet (10 mg total) by mouth daily    fenofibrate 160 MG tablet; Take 1 tablet (160 mg total) by mouth daily

## 2024-09-11 NOTE — PATIENT INSTRUCTIONS
Medicare Preventive Visit Patient Instructions  Thank you for completing your Welcome to Medicare Visit or Medicare Annual Wellness Visit today. Your next wellness visit will be due in one year (9/12/2025).  The screening/preventive services that you may require over the next 5-10 years are detailed below. Some tests may not apply to you based off risk factors and/or age. Screening tests ordered at today's visit but not completed yet may show as past due. Also, please note that scanned in results may not display below.  Preventive Screenings:  Service Recommendations Previous Testing/Comments   Colorectal Cancer Screening  * Colonoscopy    * Fecal Occult Blood Test (FOBT)/Fecal Immunochemical Test (FIT)  * Fecal DNA/Cologuard Test  * Flexible Sigmoidoscopy Age: 45-75 years old   Colonoscopy: every 10 years (may be performed more frequently if at higher risk)  OR  FOBT/FIT: every 1 year  OR  Cologuard: every 3 years  OR  Sigmoidoscopy: every 5 years  Screening may be recommended earlier than age 45 if at higher risk for colorectal cancer. Also, an individualized decision between you and your healthcare provider will decide whether screening between the ages of 76-85 would be appropriate. Colonoscopy: 10/21/2020  FOBT/FIT: Not on file  Cologuard: Not on file  Sigmoidoscopy: Not on file          Breast Cancer Screening Age: 40+ years old  Frequency: every 1-2 years  Not required if history of left and right mastectomy Mammogram: 02/22/2022        Cervical Cancer Screening Between the ages of 21-29, pap smear recommended once every 3 years.   Between the ages of 30-65, can perform pap smear with HPV co-testing every 5 years.   Recommendations may differ for women with a history of total hysterectomy, cervical cancer, or abnormal pap smears in past. Pap Smear: Not on file        Hepatitis C Screening Once for adults born between 1945 and 1965  More frequently in patients at high risk for Hepatitis C Hep C Antibody: Not  on file        Diabetes Screening 1-2 times per year if you're at risk for diabetes or have pre-diabetes Fasting glucose: 105 mg/dL (1/4/2024)  A1C: 6.2 % (12/1/2023)      Cholesterol Screening Once every 5 years if you don't have a lipid disorder. May order more often based on risk factors. Lipid panel: 01/04/2024          Other Preventive Screenings Covered by Medicare:  Abdominal Aortic Aneurysm (AAA) Screening: covered once if your at risk. You're considered to be at risk if you have a family history of AAA.  Lung Cancer Screening: covers low dose CT scan once per year if you meet all of the following conditions: (1) Age 55-77; (2) No signs or symptoms of lung cancer; (3) Current smoker or have quit smoking within the last 15 years; (4) You have a tobacco smoking history of at least 20 pack years (packs per day multiplied by number of years you smoked); (5) You get a written order from a healthcare provider.  Glaucoma Screening: covered annually if you're considered high risk: (1) You have diabetes OR (2) Family history of glaucoma OR (3)  aged 50 and older OR (4)  American aged 65 and older  Osteoporosis Screening: covered every 2 years if you meet one of the following conditions: (1) You're estrogen deficient and at risk for osteoporosis based off medical history and other findings; (2) Have a vertebral abnormality; (3) On glucocorticoid therapy for more than 3 months; (4) Have primary hyperparathyroidism; (5) On osteoporosis medications and need to assess response to drug therapy.   Last bone density test (DXA Scan): 02/22/2022.  HIV Screening: covered annually if you're between the age of 15-65. Also covered annually if you are younger than 15 and older than 65 with risk factors for HIV infection. For pregnant patients, it is covered up to 3 times per pregnancy.    Immunizations:  Immunization Recommendations   Influenza Vaccine Annual influenza vaccination during flu season is  recommended for all persons aged >= 6 months who do not have contraindications   Pneumococcal Vaccine   * Pneumococcal conjugate vaccine = PCV13 (Prevnar 13), PCV15 (Vaxneuvance), PCV20 (Prevnar 20)  * Pneumococcal polysaccharide vaccine = PPSV23 (Pneumovax) Adults 19-63 yo with certain risk factors or if 65+ yo  If never received any pneumonia vaccine: recommend Prevnar 20 (PCV20)  Give PCV20 if previously received 1 dose of PCV13 or PPSV23   Hepatitis B Vaccine 3 dose series if at intermediate or high risk (ex: diabetes, end stage renal disease, liver disease)   Respiratory syncytial virus (RSV) Vaccine - COVERED BY MEDICARE PART D  * RSVPreF3 (Arexvy) CDC recommends that adults 60 years of age and older may receive a single dose of RSV vaccine using shared clinical decision-making (SCDM)   Tetanus (Td) Vaccine - COST NOT COVERED BY MEDICARE PART B Following completion of primary series, a booster dose should be given every 10 years to maintain immunity against tetanus. Td may also be given as tetanus wound prophylaxis.   Tdap Vaccine - COST NOT COVERED BY MEDICARE PART B Recommended at least once for all adults. For pregnant patients, recommended with each pregnancy.   Shingles Vaccine (Shingrix) - COST NOT COVERED BY MEDICARE PART B  2 shot series recommended in those 19 years and older who have or will have weakened immune systems or those 50 years and older     Health Maintenance Due:      Topic Date Due   • Hepatitis C Screening  Never done   • Colorectal Cancer Screening  10/21/2021   • Lung Cancer Screening  02/08/2023   • Breast Cancer Screening: Mammogram  02/22/2023     Immunizations Due:      Topic Date Due   • Pneumococcal Vaccine: 65+ Years (1 of 2 - PCV) Never done   • COVID-19 Vaccine (3 - 2023-24 season) 09/01/2024   • Influenza Vaccine (1) 09/01/2024     Advance Directives   What are advance directives?  Advance directives are legal documents that state your wishes and plans for medical care.  These plans are made ahead of time in case you lose your ability to make decisions for yourself. Advance directives can apply to any medical decision, such as the treatments you want, and if you want to donate organs.   What are the types of advance directives?  There are many types of advance directives, and each state has rules about how to use them. You may choose a combination of any of the following:  Living will:  This is a written record of the treatment you want. You can also choose which treatments you do not want, which to limit, and which to stop at a certain time. This includes surgery, medicine, IV fluid, and tube feedings.   Durable power of  for healthcare (DPAHC):  This is a written record that states who you want to make healthcare choices for you when you are unable to make them for yourself. This person, called a proxy, is usually a family member or a friend. You may choose more than 1 proxy.  Do not resuscitate (DNR) order:  A DNR order is used in case your heart stops beating or you stop breathing. It is a request not to have certain forms of treatment, such as CPR. A DNR order may be included in other types of advance directives.  Medical directive:  This covers the care that you want if you are in a coma, near death, or unable to make decisions for yourself. You can list the treatments you want for each condition. Treatment may include pain medicine, surgery, blood transfusions, dialysis, IV or tube feedings, and a ventilator (breathing machine).  Values history:  This document has questions about your views, beliefs, and how you feel and think about life. This information can help others choose the care that you would choose.  Why are advance directives important?  An advance directive helps you control your care. Although spoken wishes may be used, it is better to have your wishes written down. Spoken wishes can be misunderstood, or not followed. Treatments may be given even if you  do not want them. An advance directive may make it easier for your family to make difficult choices about your care.   Urinary Incontinence   Urinary incontinence (UI)  is when you lose control of your bladder. UI develops because your bladder cannot store or empty urine properly. The 3 most common types of UI are stress incontinence, urge incontinence, or both.  Medicines:   May be given to help strengthen your bladder control. Report any side effects of medication to your healthcare provider.  Do pelvic muscle exercises often:  Your pelvic muscles help you stop urinating. Squeeze these muscles tight for 5 seconds, then relax for 5 seconds. Gradually work up to squeezing for 10 seconds. Do 3 sets of 15 repetitions a day, or as directed. This will help strengthen your pelvic muscles and improve bladder control.  Train your bladder:  Go to the bathroom at set times, such as every 2 hours, even if you do not feel the urge to go. You can also try to hold your urine when you feel the urge to go. For example, hold your urine for 5 minutes when you feel the urge to go. As that becomes easier, hold your urine for 10 minutes.   Self-care:   Keep a UI record.  Write down how often you leak urine and how much you leak. Make a note of what you were doing when you leaked urine.  Drink liquids as directed. You may need to limit the amount of liquid you drink to help control your urine leakage. Do not drink any liquid right before you go to bed. Limit or do not have drinks that contain caffeine or alcohol.   Prevent constipation.  Eat a variety of high-fiber foods. Good examples are high-fiber cereals, beans, vegetables, and whole-grain breads. Walking is the best way to trigger your intestines to have a bowel movement.  Exercise regularly and maintain a healthy weight.  Weight loss and exercise will decrease pressure on your bladder and help you control your leakage.   Use a catheter as directed  to help empty your bladder. A  catheter is a tiny, plastic tube that is put into your bladder to drain your urine.   Go to behavior therapy as directed.  Behavior therapy may be used to help you learn to control your urge to urinate.    Cigarette Smoking and Your Health   Risks to your health if you smoke:  Nicotine and other chemicals found in tobacco damage every cell in your body. Even if you are a light smoker, you have an increased risk for cancer, heart disease, and lung disease. If you are pregnant or have diabetes, smoking increases your risk for complications.   Benefits to your health if you stop smoking:   You decrease respiratory symptoms such as coughing, wheezing, and shortness of breath.   You reduce your risk for cancers of the lung, mouth, throat, kidney, bladder, pancreas, stomach, and cervix. If you already have cancer, you increase the benefits of chemotherapy. You also reduce your risk for cancer returning or a second cancer from developing.   You reduce your risk for heart disease, blood clots, heart attack, and stroke.   You reduce your risk for lung infections, and diseases such as pneumonia, asthma, chronic bronchitis, and emphysema.  Your circulation improves. More oxygen can be delivered to your body. If you have diabetes, you lower your risk for complications, such as kidney, artery, and eye diseases. You also lower your risk for nerve damage. Nerve damage can lead to amputations, poor vision, and blindness.  You improve your body's ability to heal and to fight infections.  For more information and support to stop smoking:   [employer]  Phone: 4- 316 [phone]  Web Address: www.[employer] [facility]  Weight Management   Why it is important to manage your weight:  Being overweight increases your risk of health conditions such as heart disease, high blood pressure, type 2 diabetes, and certain types of cancer. It can also increase your risk for osteoarthritis, sleep apnea, and other respiratory problems. Aim for a slow,  steady weight loss. Even a small amount of weight loss can lower your risk of health problems.  How to lose weight safely:  A safe and healthy way to lose weight is to eat fewer calories and get regular exercise. You can lose up about 1 pound a week by decreasing the number of calories you eat by 500 calories each day.   Healthy meal plan for weight management:  A healthy meal plan includes a variety of foods, contains fewer calories, and helps you stay healthy. A healthy meal plan includes the following:  Eat whole-grain foods more often.  A healthy meal plan should contain fiber. Fiber is the part of grains, fruits, and vegetables that is not broken down by your body. Whole-grain foods are healthy and provide extra fiber in your diet. Some examples of whole-grain foods are whole-wheat breads and pastas, oatmeal, brown rice, and bulgur.  Eat a variety of vegetables every day.  Include dark, leafy greens such as spinach, kale, andres greens, and mustard greens. Eat yellow and orange vegetables such as carrots, sweet potatoes, and winter squash.   Eat a variety of fruits every day.  Choose fresh or canned fruit (canned in its own juice or light syrup) instead of juice. Fruit juice has very little or no fiber.  Eat low-fat dairy foods.  Drink fat-free (skim) milk or 1% milk. Eat fat-free yogurt and low-fat cottage cheese. Try low-fat cheeses such as mozzarella and other reduced-fat cheeses.  Choose meat and other protein foods that are low in fat.  Choose beans or other legumes such as split peas or lentils. Choose fish, skinless poultry (chicken or turkey), or lean cuts of red meat (beef or pork). Before you cook meat or poultry, cut off any visible fat.   Use less fat and oil.  Try baking foods instead of frying them. Add less fat, such as margarine, sour cream, regular salad dressing and mayonnaise to foods. Eat fewer high-fat foods. Some examples of high-fat foods include french fries, doughnuts, ice cream, and  cakes.  Eat fewer sweets.  Limit foods and drinks that are high in sugar. This includes candy, cookies, regular soda, and sweetened drinks.  Exercise:  Exercise at least 30 minutes per day on most days of the week. Some examples of exercise include walking, biking, dancing, and swimming. You can also fit in more physical activity by taking the stairs instead of the elevator or parking farther away from stores. Ask your healthcare provider about the best exercise plan for you.      © Copyright [employer] 2018 Information is for End User's use only and may not be sold, redistributed or otherwise used for commercial purposes. All illustrations and images included in CareNotes® are the copyrighted property of [employer] or [employer]

## 2024-09-12 ENCOUNTER — APPOINTMENT (OUTPATIENT)
Dept: LAB | Facility: HOSPITAL | Age: 69
End: 2024-09-12
Payer: COMMERCIAL

## 2024-09-12 DIAGNOSIS — Z11.59 NEED FOR HEPATITIS C SCREENING TEST: ICD-10-CM

## 2024-09-12 DIAGNOSIS — E11.9 TYPE 2 DIABETES MELLITUS WITHOUT COMPLICATION, WITHOUT LONG-TERM CURRENT USE OF INSULIN (HCC): ICD-10-CM

## 2024-09-12 DIAGNOSIS — I10 PRIMARY HYPERTENSION: ICD-10-CM

## 2024-09-12 DIAGNOSIS — E78.2 MIXED HYPERLIPIDEMIA: ICD-10-CM

## 2024-09-12 LAB
ALBUMIN SERPL BCG-MCNC: 4 G/DL (ref 3.5–5)
ALP SERPL-CCNC: 54 U/L (ref 34–104)
ALT SERPL W P-5'-P-CCNC: 11 U/L (ref 7–52)
ANION GAP SERPL CALCULATED.3IONS-SCNC: 9 MMOL/L (ref 4–13)
AST SERPL W P-5'-P-CCNC: 16 U/L (ref 13–39)
BASOPHILS # BLD AUTO: 0.07 THOUSANDS/ΜL (ref 0–0.1)
BASOPHILS NFR BLD AUTO: 1 % (ref 0–1)
BILIRUB SERPL-MCNC: 0.41 MG/DL (ref 0.2–1)
BUN SERPL-MCNC: 14 MG/DL (ref 5–25)
CALCIUM SERPL-MCNC: 9.6 MG/DL (ref 8.4–10.2)
CHLORIDE SERPL-SCNC: 103 MMOL/L (ref 96–108)
CHOLEST SERPL-MCNC: 141 MG/DL
CO2 SERPL-SCNC: 27 MMOL/L (ref 21–32)
CREAT SERPL-MCNC: 0.85 MG/DL (ref 0.6–1.3)
CREAT UR-MCNC: 36.4 MG/DL
EOSINOPHIL # BLD AUTO: 0.24 THOUSAND/ΜL (ref 0–0.61)
EOSINOPHIL NFR BLD AUTO: 3 % (ref 0–6)
ERYTHROCYTE [DISTWIDTH] IN BLOOD BY AUTOMATED COUNT: 12.7 % (ref 11.6–15.1)
GFR SERPL CREATININE-BSD FRML MDRD: 70 ML/MIN/1.73SQ M
GLUCOSE P FAST SERPL-MCNC: 89 MG/DL (ref 65–99)
HCT VFR BLD AUTO: 38.7 % (ref 34.8–46.1)
HCV AB SER QL: NORMAL
HDLC SERPL-MCNC: 44 MG/DL
HGB BLD-MCNC: 13.7 G/DL (ref 11.5–15.4)
IMM GRANULOCYTES # BLD AUTO: 0.06 THOUSAND/UL (ref 0–0.2)
IMM GRANULOCYTES NFR BLD AUTO: 1 % (ref 0–2)
LDLC SERPL CALC-MCNC: 64 MG/DL (ref 0–100)
LYMPHOCYTES # BLD AUTO: 2.31 THOUSANDS/ΜL (ref 0.6–4.47)
LYMPHOCYTES NFR BLD AUTO: 27 % (ref 14–44)
MCH RBC QN AUTO: 33 PG (ref 26.8–34.3)
MCHC RBC AUTO-ENTMCNC: 35.4 G/DL (ref 31.4–37.4)
MCV RBC AUTO: 93 FL (ref 82–98)
MICROALBUMIN UR-MCNC: 14.5 MG/L
MICROALBUMIN/CREAT 24H UR: 40 MG/G CREATININE (ref 0–30)
MONOCYTES # BLD AUTO: 0.72 THOUSAND/ΜL (ref 0.17–1.22)
MONOCYTES NFR BLD AUTO: 8 % (ref 4–12)
NEUTROPHILS # BLD AUTO: 5.24 THOUSANDS/ΜL (ref 1.85–7.62)
NEUTS SEG NFR BLD AUTO: 60 % (ref 43–75)
NRBC BLD AUTO-RTO: 0 /100 WBCS
PLATELET # BLD AUTO: 311 THOUSANDS/UL (ref 149–390)
PMV BLD AUTO: 10.6 FL (ref 8.9–12.7)
POTASSIUM SERPL-SCNC: 4.1 MMOL/L (ref 3.5–5.3)
PROT SERPL-MCNC: 6.7 G/DL (ref 6.4–8.4)
RBC # BLD AUTO: 4.15 MILLION/UL (ref 3.81–5.12)
SODIUM SERPL-SCNC: 139 MMOL/L (ref 135–147)
TRIGL SERPL-MCNC: 166 MG/DL
WBC # BLD AUTO: 8.64 THOUSAND/UL (ref 4.31–10.16)

## 2024-09-12 PROCEDURE — 86803 HEPATITIS C AB TEST: CPT

## 2024-09-12 PROCEDURE — 82043 UR ALBUMIN QUANTITATIVE: CPT

## 2024-09-12 PROCEDURE — 80061 LIPID PANEL: CPT

## 2024-09-12 PROCEDURE — 36415 COLL VENOUS BLD VENIPUNCTURE: CPT

## 2024-09-12 PROCEDURE — 82570 ASSAY OF URINE CREATININE: CPT

## 2024-09-12 PROCEDURE — 80053 COMPREHEN METABOLIC PANEL: CPT

## 2024-09-12 PROCEDURE — 85025 COMPLETE CBC W/AUTO DIFF WBC: CPT

## 2024-09-13 ENCOUNTER — TELEPHONE (OUTPATIENT)
Age: 69
End: 2024-09-13

## 2024-09-16 ENCOUNTER — TELEPHONE (OUTPATIENT)
Age: 69
End: 2024-09-16

## 2024-09-16 NOTE — TELEPHONE ENCOUNTER
Patient would like someone to go over her lab results.  Please contact patient and advise.  Thank you.

## 2024-09-17 ENCOUNTER — HOSPITAL ENCOUNTER (OUTPATIENT)
Dept: CT IMAGING | Facility: HOSPITAL | Age: 69
Discharge: HOME | End: 2024-09-17
Payer: COMMERCIAL

## 2024-09-17 DIAGNOSIS — F17.210 SMOKING GREATER THAN 20 PACK YEARS: ICD-10-CM

## 2024-09-17 PROCEDURE — 71271 CT THORAX LUNG CANCER SCR C-: CPT

## 2024-09-17 NOTE — TELEPHONE ENCOUNTER
Spoke with: patient  Re:  lab results     Provider's message relayed in full detail.  Comments:     Informed pt that as soon as reviewed we will call with results

## 2024-09-19 ENCOUNTER — TELEPHONE (OUTPATIENT)
Age: 69
End: 2024-09-19

## 2024-09-19 DIAGNOSIS — E78.2 MIXED HYPERLIPIDEMIA: Primary | ICD-10-CM

## 2024-09-19 RX ORDER — ATORVASTATIN CALCIUM 20 MG/1
20 TABLET, FILM COATED ORAL DAILY
Qty: 90 TABLET | Refills: 1 | Status: SHIPPED | OUTPATIENT
Start: 2024-09-19

## 2024-09-19 NOTE — TELEPHONE ENCOUNTER
First unable to addend message.     The patient returned missed call.    Advised patient of the CT Scan results. Will repeat CT Scan in 1 year.     Also reviewed lab results. Patient wanted to let April Treible know she is taking all of her medication daily. Not missing any doses.     Please advise.

## 2024-09-19 NOTE — TELEPHONE ENCOUNTER
Patient called,    Patient is requesting to please return her call   Home number: [phone]    Pt only wants to speak to April, has a lot of questions. And doesn't want to repeat questions with someone else.      Offered to relay message as per provider message below: pt refused. Only wants to speak to April to return her call.    Magdalena Mckeon PA-C   to [facility]       9/19/24 12:07 PM  I would recommend going up on her cholesterol medicine at this time then to get that LDL under control.  I would go up to 20 mg of the atorvastatin and then get repeat labs in 3 months.  I will put the orders in for both the medication and lab work.  She doesn't need a follow up appointment at this time.    Please reach out to patient to advise.    Please Advise. Thank You!

## 2024-09-19 NOTE — TELEPHONE ENCOUNTER
----- Message from Magdalena Mckeon PA-C sent at 9/18/2024  6:31 PM EDT -----  Let Chen know that her lung CT shows her right lower lung nodule that is unchanged from her prior CT scans.  This is consistent with a benign lesion and no need to have a biopsy there is some emphysema seen which is consistent with the amount she is smoking.  It is recommended she get a repeat CT scan in 1 year.

## 2024-09-25 ENCOUNTER — OFFICE VISIT (OUTPATIENT)
Age: 69
End: 2024-09-25
Payer: COMMERCIAL

## 2024-09-25 ENCOUNTER — HOSPITAL ENCOUNTER (OUTPATIENT)
Age: 69
Discharge: HOME | End: 2024-09-25
Payer: COMMERCIAL

## 2024-09-25 VITALS — HEIGHT: 59 IN | WEIGHT: 151 LBS | BODY MASS INDEX: 30.44 KG/M2

## 2024-09-25 VITALS
OXYGEN SATURATION: 97 % | DIASTOLIC BLOOD PRESSURE: 64 MMHG | BODY MASS INDEX: 30.35 KG/M2 | RESPIRATION RATE: 16 BRPM | HEIGHT: 60 IN | WEIGHT: 154.6 LBS | HEART RATE: 81 BPM | SYSTOLIC BLOOD PRESSURE: 132 MMHG | TEMPERATURE: 98.5 F

## 2024-09-25 DIAGNOSIS — L98.9 SKIN LESION: ICD-10-CM

## 2024-09-25 DIAGNOSIS — E78.2 MIXED HYPERLIPIDEMIA: ICD-10-CM

## 2024-09-25 DIAGNOSIS — F32.0 CURRENT MILD EPISODE OF MAJOR DEPRESSIVE DISORDER, UNSPECIFIED WHETHER RECURRENT (HCC): ICD-10-CM

## 2024-09-25 DIAGNOSIS — I10 PRIMARY HYPERTENSION: ICD-10-CM

## 2024-09-25 DIAGNOSIS — Z12.31 ENCOUNTER FOR SCREENING MAMMOGRAM FOR BREAST CANCER: ICD-10-CM

## 2024-09-25 DIAGNOSIS — E11.9 TYPE 2 DIABETES MELLITUS WITHOUT COMPLICATION, WITHOUT LONG-TERM CURRENT USE OF INSULIN (HCC): Primary | ICD-10-CM

## 2024-09-25 DIAGNOSIS — Z78.0 ASYMPTOMATIC POSTMENOPAUSAL STATE: ICD-10-CM

## 2024-09-25 PROCEDURE — 88305 TISSUE EXAM BY PATHOLOGIST: CPT | Performed by: PATHOLOGY

## 2024-09-25 PROCEDURE — 77063 BREAST TOMOSYNTHESIS BI: CPT

## 2024-09-25 PROCEDURE — 11102 TANGNTL BX SKIN SINGLE LES: CPT

## 2024-09-25 PROCEDURE — 77067 SCR MAMMO BI INCL CAD: CPT

## 2024-09-25 PROCEDURE — 77080 DXA BONE DENSITY AXIAL: CPT

## 2024-09-25 PROCEDURE — 99214 OFFICE O/P EST MOD 30 MIN: CPT

## 2024-09-25 NOTE — PROGRESS NOTES
Ambulatory Visit  Name: Chetna Sandhu      : 1955      MRN: [id number]  Encounter Provider: Magdalena Mckeon PA-C  Encounter Date: 2024   Encounter department: [facility] [place]    Assessment & Plan  Type 2 diabetes mellitus without complication, without long-term current use of insulin (HCC)    Lab Results   Component Value Date    HGBA1C 6.1 2024     Patient plans to follow-up with her eye doctor in the near future.  A1c is well-controlled.  Continue on Januvia and ARB and statin.       Primary hypertension  At goal, continue valsartan, metoprolol, and Lasix.       Current mild episode of major depressive disorder, unspecified whether recurrent (HCC)  Stable, not on any medications.  Counseled patient on reducing stress.  Sounds like they have made a little progress with placement for her sister.  The nieces are looking at facilities online.  Encouraged to keep making her needs known to the family and the stress that caregiving is placing on her and her own health.         Mixed hyperlipidemia  Did increase her atorvastatin from 10 to 20 mg.  She does walk for exercise.  Discussed her ASCVD risk.  Strongly encouraged to stop smoking.  Continue on atorvastatin and fenofibrate.    The 10-year ASCVD risk score (Kristal FIELDS, et al., 2019) is: 32.4%    Values used to calculate the score:      Age: 69 years      Sex: Female      Is Non- : No      Diabetic: Yes      Tobacco smoker: Yes      Systolic Blood Pressure: 132 mmHg      Is BP treated: Yes      HDL Cholesterol: 44 mg/dL      Total Cholesterol: 141 mg/dL         Skin lesion  Pt had a brown macule of the left forearm she wanted removed and biopsied.  It enlarged over the past year fairly quickly.  Not painful.  A shave biopsy was performed with good hemostasis achieved.  Pt to watch for s/s of infection, use polysporin on wound and cover with bandage twice daily.  May wash with soap and water, pat  dry.  Orders:    Tissue Exam; Future    Tissue Exam    Biopsy       History of Present Illness     HPI    Pt is here for two week follow up.    Pt's atorvastatin was increased from 10 to 20 mg. Triglycerides are improved from prior, but still slightly high, from 305 to 166.  She is down to 1/3 pack of cigarettes a day.  Blood pressure is at goal.    She has a slight increase of albumin/Cr ratio.  Currently on Januvia and valsartan.    She is overdue for a colonoscopy.  She had a flat polypoid cecal lesion that could not be removed at her 2020 colonoscopy, had a right hemocolectomy - benign tubular adenoma was noted.  Cancelled her follow up colonoscopy in 2021.  She was also planned to get EGD with biopsies to evaluate her IBS-D.    She also was referred to hematology for leukocytosis.  She plans to follow-up in the near future for this.  No B symptoms reported.      Review of Systems   Constitutional: Negative.    HENT: Negative.     Respiratory:  Negative for cough and shortness of breath.    Cardiovascular:  Negative for chest pain.   Gastrointestinal: Negative.    Genitourinary: Negative.    Musculoskeletal:  Negative for gait problem.   Skin:  Negative for rash.   Neurological:  Negative for syncope, light-headedness and headaches.   Psychiatric/Behavioral:  Positive for dysphoric mood. Negative for suicidal ideas.    All other systems reviewed and are negative.          Objective     There were no vitals taken for this visit.    Physical Exam  Vitals and nursing note reviewed.   Constitutional:       General: She is not in acute distress.     Appearance: Normal appearance.   Cardiovascular:      Rate and Rhythm: Normal rate and regular rhythm.      Heart sounds: Normal heart sounds.   Pulmonary:      Effort: Pulmonary effort is normal. No respiratory distress.      Breath sounds: Normal breath sounds.   Musculoskeletal:         General: No tenderness, deformity or signs of injury.   Skin:     General: Skin is  "warm and dry.      Findings: Lesion present. No erythema or petechiae.             Comments: Brown macule noted on left forearm.  No skin irritation, bleeding, or scaling of the macule.   Neurological:      Mental Status: She is alert and oriented to person, place, and time.       Biopsy    Date/Time: 9/25/2024 8:40 AM    Performed by: Magdalena Mckeon PA-C  Authorized by: Magdalena Mckeon PA-C  Universal Protocol:  procedure performed by consultantConsent: Verbal consent obtained.  Consent given by: patient  Time out: Immediately prior to procedure a \"time out\" was called to verify the correct patient, procedure, equipment, support staff and site/side marked as required.  Patient understanding: patient states understanding of the procedure being performed  Patient consent: the patient's understanding of the procedure matches consent given  Procedure consent: procedure consent matches procedure scheduled  Patient identity confirmed: verbally with patient    Procedure Details - Lesion Biopsy:     Body area:  Upper extremity    Upper extremity location:  L lower arm    Biopsy method: shave biopsy      Biopsy tissue type: skin and subcutaneous    Initial size (mm):  10    Final defect size (mm):  10    Malignancy: malignancy unknown       1 mL of Lidocaine instilled into subcutaneous tissue.  Adequate anesthesia achieved.  Shave biopsy done, pt tolerated well.  Hemostasis achieved with pressure and drysol.  Antibiotic ointment applied and covered with bandage.          "

## 2024-09-25 NOTE — ASSESSMENT & PLAN NOTE
Lab Results   Component Value Date    HGBA1C 6.1 09/11/2024     Patient plans to follow-up with her eye doctor in the near future.  A1c is well-controlled.  Continue on Januvia and ARB and statin.       
At goal, continue valsartan, metoprolol, and Lasix.       
Did increase her atorvastatin from 10 to 20 mg.  She does walk for exercise.  Discussed her ASCVD risk.  Strongly encouraged to stop smoking.  Continue on atorvastatin and fenofibrate.    The 10-year ASCVD risk score (Kristal FIELDS, et al., 2019) is: 32.4%    Values used to calculate the score:      Age: 69 years      Sex: Female      Is Non- : No      Diabetic: Yes      Tobacco smoker: Yes      Systolic Blood Pressure: 132 mmHg      Is BP treated: Yes      HDL Cholesterol: 44 mg/dL      Total Cholesterol: 141 mg/dL         
Stable, not on any medications.  Counseled patient on reducing stress.  Sounds like they have made a little progress with placement for her sister.  The nieces are looking at facilities online.  Encouraged to keep making her needs known to the family and the stress that caregiving is placing on her and her own health.         
Other (Free Text): Courtesy cautery right neck x 4
Detail Level: Zone
Note Text (......Xxx Chief Complaint.): This diagnosis correlates with the
Render Risk Assessment In Note?: no

## 2024-09-26 ENCOUNTER — TELEPHONE (OUTPATIENT)
Age: 69
End: 2024-09-26

## 2024-09-26 NOTE — TELEPHONE ENCOUNTER
----- Message from Magdalena Mckeon PA-C sent at 9/26/2024  1:31 PM EDT -----  Let Chen know her mammogram was normal.

## 2024-09-27 ENCOUNTER — TELEPHONE (OUTPATIENT)
Age: 69
End: 2024-09-27

## 2024-09-27 NOTE — TELEPHONE ENCOUNTER
Pt called in about her bone density results. I offered to read them to her and pt declined and said she would like to speak with April instead.     Please advise, thanks

## 2024-09-30 PROCEDURE — 88305 TISSUE EXAM BY PATHOLOGIST: CPT | Performed by: PATHOLOGY

## 2024-10-01 ENCOUNTER — TELEPHONE (OUTPATIENT)
Age: 69
End: 2024-10-01

## 2024-10-01 NOTE — TELEPHONE ENCOUNTER
----- Message from Magdalena Mckeon PA-C sent at 10/1/2024  9:13 AM EDT -----  Please let Chen know that her biopsy shows it was a verrucous keratosis, which is like a wart.  That is why it probably grew so quickly.  There is no further treatment needed.    Please ask her if she is having any issues with it healing or signs of infection such as redness or drainage.  Thank you

## 2024-10-15 DIAGNOSIS — E11.22 TYPE 2 DIABETES MELLITUS WITH STAGE 3A CHRONIC KIDNEY DISEASE, WITHOUT LONG-TERM CURRENT USE OF INSULIN (HCC): ICD-10-CM

## 2024-10-15 DIAGNOSIS — N18.31 TYPE 2 DIABETES MELLITUS WITH STAGE 3A CHRONIC KIDNEY DISEASE, WITHOUT LONG-TERM CURRENT USE OF INSULIN (HCC): ICD-10-CM

## 2024-10-17 LAB
LEFT EYE DIABETIC RETINOPATHY: NORMAL
RIGHT EYE DIABETIC RETINOPATHY: NORMAL

## 2024-11-06 DIAGNOSIS — E78.2 MIXED HYPERLIPIDEMIA: ICD-10-CM

## 2024-11-06 RX ORDER — ATORVASTATIN CALCIUM 20 MG/1
20 TABLET, FILM COATED ORAL DAILY
Qty: 90 TABLET | Refills: 1 | Status: SHIPPED | OUTPATIENT
Start: 2024-11-06

## 2024-11-06 NOTE — TELEPHONE ENCOUNTER
Pt called in requesting medication refill of the following medication:    Requested Prescriptions     Pending Prescriptions Disp Refills    atorvastatin (LIPITOR) 20 mg tablet 90 tablet 1     Sig: Take 1 tablet (20 mg total) by mouth daily     [place] the following pharmacy[place]  [facility] [place] [address] [place] [address] [phone]     Pt mentioned that she is not sure if April wanted make a change to her dose for the medication?    For any further information or update, please call pt. Thank you!    Chetna Sandhu: [phone]

## 2024-12-04 ENCOUNTER — PREP FOR PROCEDURE (OUTPATIENT)
Age: 69
End: 2024-12-04

## 2024-12-04 ENCOUNTER — TELEPHONE (OUTPATIENT)
Age: 69
End: 2024-12-04

## 2024-12-04 ENCOUNTER — OFFICE VISIT (OUTPATIENT)
Age: 69
End: 2024-12-04
Payer: COMMERCIAL

## 2024-12-04 VITALS
WEIGHT: 152 LBS | HEIGHT: 59 IN | OXYGEN SATURATION: 97 % | BODY MASS INDEX: 30.64 KG/M2 | HEART RATE: 77 BPM | DIASTOLIC BLOOD PRESSURE: 80 MMHG | SYSTOLIC BLOOD PRESSURE: 142 MMHG

## 2024-12-04 DIAGNOSIS — R19.7 DIARRHEA, UNSPECIFIED TYPE: ICD-10-CM

## 2024-12-04 DIAGNOSIS — K58.0 IRRITABLE BOWEL SYNDROME WITH DIARRHEA: ICD-10-CM

## 2024-12-04 DIAGNOSIS — R10.9 ABDOMINAL PAIN, UNSPECIFIED ABDOMINAL LOCATION: ICD-10-CM

## 2024-12-04 DIAGNOSIS — B35.9 TINEA: ICD-10-CM

## 2024-12-04 DIAGNOSIS — Z86.0100 HISTORY OF COLON POLYPS: Primary | ICD-10-CM

## 2024-12-04 PROCEDURE — 99214 OFFICE O/P EST MOD 30 MIN: CPT | Performed by: PHYSICIAN ASSISTANT

## 2024-12-04 RX ORDER — SODIUM CHLORIDE, SODIUM LACTATE, POTASSIUM CHLORIDE, CALCIUM CHLORIDE 600; 310; 30; 20 MG/100ML; MG/100ML; MG/100ML; MG/100ML
125 INJECTION, SOLUTION INTRAVENOUS CONTINUOUS
OUTPATIENT
Start: 2024-12-04

## 2024-12-04 NOTE — PROGRESS NOTES
[facility] - Outpatient Follow-up Note  Chetna Sandhu 69 y.o. female MRN: [id number]  Encounter: [id number]          ASSESSMENT AND PLAN:      1. Irritable bowel syndrome with diarrhea  2. History of colon polyps  3. Abdominal pain  4. Diarrhea    Patient has a long history of diarrhea, abdominal cramping, and gas x years. She has a history of IBS-D.  Colonoscopy in October of 2020 showed a 30mm flat multilobulated polyp in the cecum which was extensive and infiltrating and not able to be removed endoscopically.  She was referred to Dr. Meek and underwent a right hemicolectomy.  Pathology was benign - tubular adenoma - no malignancy.  Biopsies of her colon during the colonoscopy were also negative for microscopic colitis and she had negative celiac serology in the past.  She was previously previously recommended for a follow up colonoscopy in the fall of 2021 but cancelled and also in 2023 when she was in the office but did not complete it.  She has failed Colestid/Questran, Imodium, Bentyl, fiber supplementation.  She reports a Xifaxan course helped her significantly a couple of years ago.  She reports of worsening symptoms and some weight loss.     Will plan for EGD with biopsies of the stomach and duodenum and colonoscopy with visualization of the terminal ileum and biopsies to investigate.  Will check a CT Scan A/P with IV contrast/enterography to investigate (previously ordered in 2023 as well but not completed and order is still active).  Recommend a trial of a low FODMAP diet and will utilize a Xifaxan 550mg po TID x 14 days as she reports responding favorably a couple of years ago.  ______________________________________________________________________    SUBJECTIVE:  Patient is a pleasant 69 year old female with a PMH of HTN, hyperlipidemia, DM who presents to the office for an evaluation.  Patient has a long history of chronic diarrhea and abdominal discomfort, gas x many  years.  She has a history of IBS-D.  She previously responded well to a Xifaxan course in the past.  Colonoscopy in October of 2020 showed a 30mm flat multilobulated polyp in the cecum which was extensive and infiltrating and not able to be removed endoscopically. She was referred to Dr. Meek and underwent a right hemicolectomy.  Pathology was benign - tubular adenoma - no malignancy. Biopsies of her colon during the colonoscopy were also negative for microscopic colitis and she had negative celiac serology in the past. She was previously previously recommended for a follow up colonoscopy in the fall of 2021 but cancelled and also in 2023 when she was in the office but did not complete it. She has failed Colestid/Questran, Imodium, Bentyl, fiber supplementation. She reports some weight loss and reports she will have the recommended procedures/testing now this time.    I discussed informed consent with the patient for the EGD/colonoscopy. The risks/benefits of the procedure were discussed with the patient. Risks included, but not limited to, infection, bleeding, perforation were discussed. Patient was agreeable.       REVIEW OF SYSTEMS IS OTHERWISE NEGATIVE.      Historical Information   Past Medical History:   Diagnosis Date    Anemia 1996    Chronic kidney disease 2022    Depression     Diabetes mellitus (HCC) 1996    Hyperlipidemia     Hypertension     Irritable bowel syndrome 2022    Nodule of lower lobe of right lung      Past Surgical History:   Procedure Laterality Date    CHOLECYSTECTOMY      COLONOSCOPY      HEMICOLOECTOMY W/ ANASTOMOSIS N/A 12/14/2020    Procedure: LAPAROSCOPIC HAND-ASSISTED RIGHT HEMICOLECTOMY;  Surgeon: Ady Meek MD;  [facility];  Service: Surgical Oncology    HYSTERECTOMY      partial     Social History   Social History     Substance and Sexual Activity   Alcohol Use Not Currently    Alcohol/week: 10.0 standard drinks of alcohol    Types: 10 Glasses of wine per week     "Comment: In a week     Social History     Substance and Sexual Activity   Drug Use Never     Social History     Tobacco Use   Smoking Status Some Days    Average packs/day: 1 pack/day for 52.0 years (52.0 ttl pk-yrs)    Types: Cigarettes    Start date: 1/29/1967   Smokeless Tobacco Never     Family History   Problem Relation Age of Onset    Throat cancer Mother     Heart disease Father        Meds/Allergies       Current Outpatient Medications:     atorvastatin (LIPITOR) 20 mg tablet    fenofibrate 160 MG tablet    furosemide (LASIX) 20 mg tablet    glucose blood (OneTouch Ultra) test strip    ketoconazole (NIZORAL) 2 % cream    magnesium (MAGTAB) 84 MG (7MEQ) TBCR    metoprolol succinate (TOPROL-XL) 25 mg 24 hr tablet    nystatin (MYCOSTATIN) powder    omeprazole (PriLOSEC) 20 mg delayed release capsule    OneTouch Delica Lancets 33G MISC    Potassium Chloride ER 20 MEQ TBCR    rifaximin (XIFAXAN) 550 mg tablet    sitaGLIPtin (JANUVIA) 100 mg tablet    valsartan (DIOVAN) 320 MG tablet    Allergies   Allergen Reactions    Ciprofloxacin Rash    Codeine Rash           Objective     Blood pressure 142/80, pulse 77, height 4' 11\" (1.499 m), weight 68.9 kg (152 lb), SpO2 97%. Body mass index is 30.7 kg/m².      PHYSICAL EXAM:      General Appearance:   Alert, cooperative, no distress   HEENT:   Normocephalic, atraumatic, anicteric.     Neck:  Supple, symmetrical, trachea midline   Lungs:   Clear to auscultation bilaterally; no rales, rhonchi or wheezing; respirations unlabored    Heart::   Regular rate and rhythm; no murmur, rub, or gallop.   Abdomen:   Soft, non-tender, non-distended; normal bowel sounds; no masses, no organomegaly    Genitalia:   Deferred    Rectal:   Deferred    Extremities:  No cyanosis, clubbing or edema    Pulses:  2+ and symmetric    Skin:  No jaundice, rashes, or lesions    Lymph nodes:  No palpable cervical lymphadenopathy        Lab Results:   No visits with results within 1 Day(s) from this " visit.   Latest known visit with results is:   Office Visit on 09/25/2024   Component Date Value    Case Report 09/25/2024                      Value:Surgical Pathology Report                         Case: [id number]                                  Authorizing Provider:  Magdalena Mckeon PA-C  Collected:           09/25/2024 0912              Ordering Location:     [facility]     Received:            09/25/2024 0913                                     [place]                                                                   Pathologist:           Silas Willis MD                                                      Specimen:    Arm, Left                                                                                  Final Diagnosis 09/25/2024                      Value:A. Skin, Arm, Left:  VERRUCOUS KERATOSIS, INFLAMED        Note 09/25/2024                      Value:Interpretation performed at [facility] Yasmeen Mccrary [address]        Additional Information 09/25/2024                      Value:All reported additional testing was performed with appropriately reactive controls.  These tests were developed and their performance characteristics determined by [facility] or appropriate performing facility, though some tests may be performed on tissues which have not been validated for performance characteristics (such as staining performed on alcohol exposed cell blocks and decalcified tissues).  Results should be interpreted with caution and in the context of the patients’ clinical condition. These tests may not be cleared or approved by the U.S. Food and Drug Administration, though the FDA has determined that such clearance or approval is not necessary. These tests are used for clinical purposes and they should not be regarded as investigational or for research. This laboratory has been approved by CLIA 88, designated as a high-complexity  "laboratory and is qualified to perform these tests.  .      Gross Description 09/25/2024                      Value:A. The specimen is received in formalin, labeled with the patient's name and hospital number, and is designated \" arm, left\".  The specimen consists of a tan-gray skin shave measuring 0.8 x 0.6 x 0.1 cm.  The epithelial surface appears keratotic.  The apparent margin of resection is inked green.  Serially sectioned and entirely submitted between sponges in 1 cassette.    Note: The estimated total formalin fixation time based upon information provided by the submitting clinician and the standard processing schedule is under 72 hours. [facility]        Clinical Information 09/25/2024                      Value:benign         Radiology Results:   No results found.  "

## 2024-12-04 NOTE — H&P (VIEW-ONLY)
[facility] - Outpatient Follow-up Note  Chetna Sandhu 69 y.o. female MRN: [id number]  Encounter: [id number]          ASSESSMENT AND PLAN:      1. Irritable bowel syndrome with diarrhea  2. History of colon polyps  3. Abdominal pain  4. Diarrhea    Patient has a long history of diarrhea, abdominal cramping, and gas x years. She has a history of IBS-D.  Colonoscopy in October of 2020 showed a 30mm flat multilobulated polyp in the cecum which was extensive and infiltrating and not able to be removed endoscopically.  She was referred to Dr. Meek and underwent a right hemicolectomy.  Pathology was benign - tubular adenoma - no malignancy.  Biopsies of her colon during the colonoscopy were also negative for microscopic colitis and she had negative celiac serology in the past.  She was previously previously recommended for a follow up colonoscopy in the fall of 2021 but cancelled and also in 2023 when she was in the office but did not complete it.  She has failed Colestid/Questran, Imodium, Bentyl, fiber supplementation.  She reports a Xifaxan course helped her significantly a couple of years ago.  She reports of worsening symptoms and some weight loss.     Will plan for EGD with biopsies of the stomach and duodenum and colonoscopy with visualization of the terminal ileum and biopsies to investigate.  Will check a CT Scan A/P with IV contrast/enterography to investigate (previously ordered in 2023 as well but not completed and order is still active).  Recommend a trial of a low FODMAP diet and will utilize a Xifaxan 550mg po TID x 14 days as she reports responding favorably a couple of years ago.  ______________________________________________________________________    SUBJECTIVE:  Patient is a pleasant 69 year old female with a PMH of HTN, hyperlipidemia, DM who presents to the office for an evaluation.  Patient has a long history of chronic diarrhea and abdominal discomfort, gas x many  years.  She has a history of IBS-D.  She previously responded well to a Xifaxan course in the past.  Colonoscopy in October of 2020 showed a 30mm flat multilobulated polyp in the cecum which was extensive and infiltrating and not able to be removed endoscopically. She was referred to Dr. Meke and underwent a right hemicolectomy.  Pathology was benign - tubular adenoma - no malignancy. Biopsies of her colon during the colonoscopy were also negative for microscopic colitis and she had negative celiac serology in the past. She was previously previously recommended for a follow up colonoscopy in the fall of 2021 but cancelled and also in 2023 when she was in the office but did not complete it. She has failed Colestid/Questran, Imodium, Bentyl, fiber supplementation. She reports some weight loss and reports she will have the recommended procedures/testing now this time.    I discussed informed consent with the patient for the EGD/colonoscopy. The risks/benefits of the procedure were discussed with the patient. Risks included, but not limited to, infection, bleeding, perforation were discussed. Patient was agreeable.       REVIEW OF SYSTEMS IS OTHERWISE NEGATIVE.      Historical Information   Past Medical History:   Diagnosis Date    Anemia 1996    Chronic kidney disease 2022    Depression     Diabetes mellitus (HCC) 1996    Hyperlipidemia     Hypertension     Irritable bowel syndrome 2022    Nodule of lower lobe of right lung      Past Surgical History:   Procedure Laterality Date    CHOLECYSTECTOMY      COLONOSCOPY      HEMICOLOECTOMY W/ ANASTOMOSIS N/A 12/14/2020    Procedure: LAPAROSCOPIC HAND-ASSISTED RIGHT HEMICOLECTOMY;  Surgeon: Ady Meek MD;  Location[facility];  Service: Surgical Oncology    HYSTERECTOMY      partial     Social History   Social History     Substance and Sexual Activity   Alcohol Use Not Currently    Alcohol/week: 10.0 standard drinks of alcohol    Types: 10 Glasses of wine per week     "Comment: In a week     Social History     Substance and Sexual Activity   Drug Use Never     Social History     Tobacco Use   Smoking Status Some Days    Average packs/day: 1 pack/day for 52.0 years (52.0 ttl pk-yrs)    Types: Cigarettes    Start date: 1/29/1967   Smokeless Tobacco Never     Family History   Problem Relation Age of Onset    Throat cancer Mother     Heart disease Father        Meds/Allergies       Current Outpatient Medications:     atorvastatin (LIPITOR) 20 mg tablet    fenofibrate 160 MG tablet    furosemide (LASIX) 20 mg tablet    glucose blood (OneTouch Ultra) test strip    ketoconazole (NIZORAL) 2 % cream    magnesium (MAGTAB) 84 MG (7MEQ) TBCR    metoprolol succinate (TOPROL-XL) 25 mg 24 hr tablet    nystatin (MYCOSTATIN) powder    omeprazole (PriLOSEC) 20 mg delayed release capsule    OneTouch Delica Lancets 33G MISC    Potassium Chloride ER 20 MEQ TBCR    rifaximin (XIFAXAN) 550 mg tablet    sitaGLIPtin (JANUVIA) 100 mg tablet    valsartan (DIOVAN) 320 MG tablet    Allergies   Allergen Reactions    Ciprofloxacin Rash    Codeine Rash           Objective     Blood pressure 142/80, pulse 77, height 4' 11\" (1.499 m), weight 68.9 kg (152 lb), SpO2 97%. Body mass index is 30.7 kg/m².      PHYSICAL EXAM:      General Appearance:   Alert, cooperative, no distress   HEENT:   Normocephalic, atraumatic, anicteric.     Neck:  Supple, symmetrical, trachea midline   Lungs:   Clear to auscultation bilaterally; no rales, rhonchi or wheezing; respirations unlabored    Heart::   Regular rate and rhythm; no murmur, rub, or gallop.   Abdomen:   Soft, non-tender, non-distended; normal bowel sounds; no masses, no organomegaly    Genitalia:   Deferred    Rectal:   Deferred    Extremities:  No cyanosis, clubbing or edema    Pulses:  2+ and symmetric    Skin:  No jaundice, rashes, or lesions    Lymph nodes:  No palpable cervical lymphadenopathy        Lab Results:   No visits with results within 1 Day(s) from this " visit.   Latest known visit with results is:   Office Visit on 09/25/2024   Component Date Value    Case Report 09/25/2024                      Value:Surgical Pathology Report                         Case: [id number]                                  Authorizing Provider:  Magdalena Mckeon PA-C  Collected:           09/25/2024 0912              Ordering Location:     [facility]     Received:            09/25/2024 0913                                     [place]                                                                   Pathologist:           Silas Willis MD                                                      Specimen:    Arm, Left                                                                                  Final Diagnosis 09/25/2024                      Value:A. Skin, Arm, Left:  VERRUCOUS KERATOSIS, INFLAMED        Note 09/25/2024                      Value:Interpretation performed at [facility] [place]me[place] Mccrary [address]        Additional Information 09/25/2024                      Value:All reported additional testing was performed with appropriately reactive controls.  These tests were developed and their performance characteristics determined by [facility] or appropriate performing facility, though some tests may be performed on tissues which have not been validated for performance characteristics (such as staining performed on alcohol exposed cell blocks and decalcified tissues).  Results should be interpreted with caution and in the context of the patients’ clinical condition. These tests may not be cleared or approved by the U.S. Food and Drug Administration, though the FDA has determined that such clearance or approval is not necessary. These tests are used for clinical purposes and they should not be regarded as investigational or for research. This laboratory has been approved by CLIA 88, designated as a high-complexity  "laboratory and is qualified to perform these tests.  .      Gross Description 09/25/2024                      Value:A. The specimen is received in formalin, labeled with the patient's name and hospital number, and is designated \" arm, left\".  The specimen consists of a tan-gray skin shave measuring 0.8 x 0.6 x 0.1 cm.  The epithelial surface appears keratotic.  The apparent margin of resection is inked green.  Serially sectioned and entirely submitted between sponges in 1 cassette.    Note: The estimated total formalin fixation time based upon information provided by the submitting clinician and the standard processing schedule is under 72 hours. [facility]        Clinical Information 09/25/2024                      Value:benign         Radiology Results:   No results found.  "

## 2024-12-04 NOTE — TELEPHONE ENCOUNTER
Patients GI provider:  Kenyetta Lazo PA-C    Number to return call: [phone]    Reason for call: Pt calling to [facility] needs prior authorization for rifaximin (Xifaxan) 550 mg tablet.     Scheduled procedure/appointment date if applicable: procedure  12/12/24

## 2024-12-04 NOTE — PATIENT INSTRUCTIONS
Scheduled date of EGD/colonoscopy (as of today): 12/12/24  Physician performing EGD/colonoscopy: Beata  Location of EGD/colonoscopy: [place]  Desired bowel prep reviewed with patient: Miralax  Instructions reviewed with patient by: Kira GUZMAN  Clearances:

## 2024-12-04 NOTE — TELEPHONE ENCOUNTER
PA for Xifaxan 550 mg    SUBMITTED to [employer]    via      [x][employer]-Case ID #     [id number]    Payer: [employer]   Phone: [phone]   Fax: [phone]       [x]PA sent as URGENT    All office notes, labs and other pertaining documents and studies sent. Clinical questions answered. Awaiting determination from insurance company.     Turnaround time for your insurance to make a decision on your Prior Authorization can take 7-21 business days.

## 2024-12-05 RX ORDER — KETOCONAZOLE 20 MG/G
CREAM TOPICAL DAILY
Qty: 60 G | Refills: 4 | Status: SHIPPED | OUTPATIENT
Start: 2024-12-05

## 2024-12-05 NOTE — TELEPHONE ENCOUNTER
PA for Xifaxan APPROVED     Date(s) approved Authorized from January 1, 2024 to December 18, 2024          Patient advised by          []MyChart Message  [x]Phone call   []LMOM  []L/M to call office as no active Communication consent on file  []Unable to leave detailed message as VM not approved on Communication consent       Pharmacy advised by    [x]Fax  []Phone call    Approval letter scanned into Media Yes

## 2024-12-09 ENCOUNTER — TELEPHONE (OUTPATIENT)
Age: 69
End: 2024-12-09

## 2024-12-09 NOTE — TELEPHONE ENCOUNTER
Patients GI provider:  JUAN Lazo    Number to return call: [phone]    Reason for call: Pt calling asking if she can replace the miralax dul prep with just magnesium citrate. Please call and advise    Scheduled procedure/appointment date if applicable: procedure 12/12/2024

## 2024-12-10 NOTE — TELEPHONE ENCOUNTER
Patient called in to check on status of message she has not heard anything yet and her procedure is 12/12

## 2024-12-11 NOTE — TELEPHONE ENCOUNTER
Patient called and wanted to go over prep instructions to verify what she had to purchase thank you

## 2024-12-12 DIAGNOSIS — I10 PRIMARY HYPERTENSION: ICD-10-CM

## 2024-12-12 RX ORDER — POTASSIUM CHLORIDE 1500 MG/1
1 TABLET, EXTENDED RELEASE ORAL DAILY
Qty: 100 TABLET | Refills: 1 | Status: SHIPPED | OUTPATIENT
Start: 2024-12-12

## 2024-12-13 ENCOUNTER — TELEPHONE (OUTPATIENT)
Age: 69
End: 2024-12-13

## 2024-12-13 ENCOUNTER — PATIENT MESSAGE (OUTPATIENT)
Age: 69
End: 2024-12-13

## 2024-12-13 ENCOUNTER — PREP FOR PROCEDURE (OUTPATIENT)
Age: 69
End: 2024-12-13

## 2024-12-13 NOTE — TELEPHONE ENCOUNTER
Pt. Calling asking for prep as she has not received the email that was sent or received it in the mail, called [place] office and spoke with Kira who will email to patient from [facility]'s Email, pt. Will watch for email and if she does not receive email she will call back, pt. Is doing an old Mag Citrate 2 day prep, Kira will upload prep instructions into telephone encounter in case pt. Does not receive it , pt. Can not access her LxDATAt at this time

## 2024-12-13 NOTE — TELEPHONE ENCOUNTER
Emailed 2 day mag citrate prep to patients email on filee. Prep is also attached to this encounter for reference

## 2024-12-14 ENCOUNTER — NURSE TRIAGE (OUTPATIENT)
Dept: OTHER | Facility: OTHER | Age: 69
End: 2024-12-14

## 2024-12-14 NOTE — TELEPHONE ENCOUNTER
Informed the patient that she is okay to have plain black coffee or black tea with no milk, cream or sugar. Patient verbalized understanding, no further questions at this time.

## 2024-12-14 NOTE — TELEPHONE ENCOUNTER
"Reason for Disposition  • Bowel prep for colonoscopy, questions about    Answer Assessment - Initial Assessment Questions  1. MAIN CONCERN: \"What is your main concern right now?\" \"What questions do you have?\"      Asking if she is okay to have black coffee before colonoscopy    Protocols used: Colonoscopy Symptoms and Questions-Adult-    "

## 2024-12-14 NOTE — TELEPHONE ENCOUNTER
"Reason for Disposition  • Bowel prep for colonoscopy, questions about    Answer Assessment - Initial Assessment Questions  1. DATE/TIME: \"When did you have your colonoscopy?\"       11/16/2024 at 1130 -arrival 0930    2. MAIN CONCERN: \"What is your main concern right now?\" \"What questions do you have?\"      Patient requesting review of her prep instructions.    Protocols used: Colonoscopy Symptoms and Questions-Adult-    "

## 2024-12-14 NOTE — TELEPHONE ENCOUNTER
"Regarding: Prep questions  ----- Message from Gracie VALDES sent at 12/14/2024  9:34 AM EST -----  \" I do not know when I should start my prep for my colonoscopy\"    "

## 2024-12-16 ENCOUNTER — HOSPITAL ENCOUNTER (OUTPATIENT)
Dept: GASTROENTEROLOGY | Facility: HOSPITAL | Age: 69
Setting detail: OUTPATIENT SURGERY
Discharge: HOME | End: 2024-12-16
Payer: COMMERCIAL

## 2024-12-16 ENCOUNTER — ANESTHESIA (OUTPATIENT)
Dept: GASTROENTEROLOGY | Facility: HOSPITAL | Age: 69
End: 2024-12-16
Payer: COMMERCIAL

## 2024-12-16 ENCOUNTER — ANESTHESIA EVENT (OUTPATIENT)
Dept: GASTROENTEROLOGY | Facility: HOSPITAL | Age: 69
End: 2024-12-16
Payer: COMMERCIAL

## 2024-12-16 VITALS
SYSTOLIC BLOOD PRESSURE: 133 MMHG | WEIGHT: 147.49 LBS | HEIGHT: 61 IN | TEMPERATURE: 97.5 F | BODY MASS INDEX: 27.85 KG/M2 | DIASTOLIC BLOOD PRESSURE: 61 MMHG | RESPIRATION RATE: 15 BRPM | OXYGEN SATURATION: 98 % | HEART RATE: 77 BPM

## 2024-12-16 DIAGNOSIS — R10.9 ABDOMINAL PAIN, UNSPECIFIED ABDOMINAL LOCATION: ICD-10-CM

## 2024-12-16 DIAGNOSIS — Z86.0100 HISTORY OF COLON POLYPS: ICD-10-CM

## 2024-12-16 DIAGNOSIS — R12 HEARTBURN: ICD-10-CM

## 2024-12-16 DIAGNOSIS — R19.7 DIARRHEA, UNSPECIFIED TYPE: ICD-10-CM

## 2024-12-16 LAB — GLUCOSE SERPL-MCNC: 100 MG/DL (ref 65–140)

## 2024-12-16 PROCEDURE — 82948 REAGENT STRIP/BLOOD GLUCOSE: CPT

## 2024-12-16 PROCEDURE — 45380 COLONOSCOPY AND BIOPSY: CPT | Performed by: INTERNAL MEDICINE

## 2024-12-16 PROCEDURE — 88305 TISSUE EXAM BY PATHOLOGIST: CPT | Performed by: PATHOLOGY

## 2024-12-16 PROCEDURE — 43239 EGD BIOPSY SINGLE/MULTIPLE: CPT | Performed by: INTERNAL MEDICINE

## 2024-12-16 RX ORDER — GLYCOPYRROLATE 0.2 MG/ML
INJECTION INTRAMUSCULAR; INTRAVENOUS AS NEEDED
Status: DISCONTINUED | OUTPATIENT
Start: 2024-12-16 | End: 2024-12-16

## 2024-12-16 RX ORDER — PROPOFOL 10 MG/ML
INJECTION, EMULSION INTRAVENOUS AS NEEDED
Status: DISCONTINUED | OUTPATIENT
Start: 2024-12-16 | End: 2024-12-16

## 2024-12-16 RX ORDER — SODIUM CHLORIDE, SODIUM LACTATE, POTASSIUM CHLORIDE, CALCIUM CHLORIDE 600; 310; 30; 20 MG/100ML; MG/100ML; MG/100ML; MG/100ML
INJECTION, SOLUTION INTRAVENOUS CONTINUOUS PRN
Status: DISCONTINUED | OUTPATIENT
Start: 2024-12-16 | End: 2024-12-16

## 2024-12-16 RX ORDER — LIDOCAINE HYDROCHLORIDE 20 MG/ML
INJECTION, SOLUTION EPIDURAL; INFILTRATION; INTRACAUDAL; PERINEURAL AS NEEDED
Status: DISCONTINUED | OUTPATIENT
Start: 2024-12-16 | End: 2024-12-16

## 2024-12-16 RX ORDER — PHENYLEPHRINE HCL IN 0.9% NACL 1 MG/10 ML
SYRINGE (ML) INTRAVENOUS AS NEEDED
Status: DISCONTINUED | OUTPATIENT
Start: 2024-12-16 | End: 2024-12-16

## 2024-12-16 RX ADMIN — PROPOFOL 10 MG: 10 INJECTION, EMULSION INTRAVENOUS at 10:32

## 2024-12-16 RX ADMIN — GLYCOPYRROLATE 0.1 MCG: 0.2 INJECTION INTRAMUSCULAR; INTRAVENOUS at 10:23

## 2024-12-16 RX ADMIN — PROPOFOL 20 MG: 10 INJECTION, EMULSION INTRAVENOUS at 10:26

## 2024-12-16 RX ADMIN — PROPOFOL 10 MG: 10 INJECTION, EMULSION INTRAVENOUS at 10:33

## 2024-12-16 RX ADMIN — LIDOCAINE HYDROCHLORIDE 100 MG: 20 INJECTION, SOLUTION EPIDURAL; INFILTRATION; INTRACAUDAL; PERINEURAL at 10:24

## 2024-12-16 RX ADMIN — SODIUM CHLORIDE, SODIUM LACTATE, POTASSIUM CHLORIDE, AND CALCIUM CHLORIDE: .6; .31; .03; .02 INJECTION, SOLUTION INTRAVENOUS at 10:13

## 2024-12-16 RX ADMIN — PROPOFOL 30 MG: 10 INJECTION, EMULSION INTRAVENOUS at 10:29

## 2024-12-16 RX ADMIN — Medication 100 MCG: at 10:35

## 2024-12-16 RX ADMIN — PROPOFOL 10 MG: 10 INJECTION, EMULSION INTRAVENOUS at 10:36

## 2024-12-16 RX ADMIN — PROPOFOL 150 MG: 10 INJECTION, EMULSION INTRAVENOUS at 10:24

## 2024-12-16 NOTE — INTERVAL H&P NOTE
H&P reviewed. After examining the patient I find no changes in the patients condition since the H&P had been written.    Vitals:    12/16/24 0943   BP: 126/58   Pulse: 97   Resp: 18   Temp: 98 °F (36.7 °C)   SpO2: 99%

## 2024-12-16 NOTE — ANESTHESIA POSTPROCEDURE EVALUATION
Post-Op Assessment Note    CV Status:  Stable  Pain Score: 0    Pain management: adequate       Mental Status:  Alert   Hydration Status:  Stable   PONV Controlled:  None   Airway Patency:  Patent     Post Op Vitals Reviewed: Yes    No anethesia notable event occurred.    Staff: CRNA           Last Filed PACU Vitals:  Vitals Value Taken Time   Temp 97.5 °F (36.4 °C) 12/16/24 1048   Pulse 80 12/16/24 1048   /58 12/16/24 1048   Resp 22 12/16/24 1048   SpO2 98 % 12/16/24 1048       Modified Cindy:  Activity: 2 (12/16/2024 10:48 AM)  Respiration: 2 (12/16/2024 10:48 AM)  Circulation: 2 (12/16/2024 10:48 AM)  Consciousness: 1 (12/16/2024 10:48 AM)  Oxygen Saturation: 2 (12/16/2024 10:48 AM)  Modified Cindy Score: 9 (12/16/2024 10:48 AM)

## 2024-12-16 NOTE — ANESTHESIA PREPROCEDURE EVALUATION
Procedure:  COLONOSCOPY  EGD    Relevant Problems   CARDIO   (+) Atherosclerosis of aorta (HCC)   (+) Hyperlipidemia   (+) Hypertension      ENDO   (+) Type 2 diabetes mellitus without complication, without long-term current use of insulin (HCC)      /RENAL   (+) Stage 2 chronic kidney disease   (+) Stage 3a chronic kidney disease (HCC)      MUSCULOSKELETAL   (+) Arthritis   (+) Primary osteoarthritis of both knees      NEURO/PSYCH   (+) Current mild episode of major depressive disorder, unspecified whether recurrent (HCC)   (+) Depression      PULMONARY   (+) Centrilobular emphysema (HCC)        Physical Exam    Airway    Mallampati score: II  TM Distance: >3 FB  Neck ROM: full     Dental    lower dentures and upper dentures    Cardiovascular      Pulmonary      Other Findings  post-pubertal.      Anesthesia Plan  ASA Score- 3     Anesthesia Type- IV sedation with anesthesia with ASA Monitors.         Additional Monitors:     Airway Plan:            Plan Factors-Exercise tolerance (METS): >4 METS.    Chart reviewed.    Patient summary reviewed.    Patient is a current smoker.  Patient instructed to abstain from smoking on day of procedure. Patient smoked on day of surgery.    There is medical exclusion for perioperative obstructive sleep apnea risk education.        Induction- intravenous.    Postoperative Plan-         Informed Consent- Anesthetic plan and risks discussed with patient.  I personally reviewed this patient with the CRNA. Discussed and agreed on the Anesthesia Plan with the CRNA..

## 2024-12-18 PROCEDURE — 88305 TISSUE EXAM BY PATHOLOGIST: CPT | Performed by: PATHOLOGY

## 2024-12-27 ENCOUNTER — TELEPHONE (OUTPATIENT)
Age: 69
End: 2024-12-27

## 2024-12-27 ENCOUNTER — TELEPHONE (OUTPATIENT)
Dept: LAB | Facility: HOSPITAL | Age: 69
End: 2024-12-27

## 2024-12-27 DIAGNOSIS — E83.42 HYPOMAGNESEMIA: Primary | ICD-10-CM

## 2024-12-27 DIAGNOSIS — E61.1 IRON DEFICIENCY: ICD-10-CM

## 2024-12-31 ENCOUNTER — TELEPHONE (OUTPATIENT)
Age: 69
End: 2024-12-31

## 2024-12-31 NOTE — TELEPHONE ENCOUNTER
Patients GI provider:  Dr. Cota    Number to return call: [phone]    Reason for call: Pt calling to get her results from her colonoscopy. Please reach back out to the pt    Scheduled procedure/appointment date if applicable: Apt/procedure

## 2024-12-31 NOTE — TELEPHONE ENCOUNTER
Spoke with the pt, notified her of results  She stated she is still having diarrhea  Follow up appt scheduled for 01/07

## 2025-01-02 ENCOUNTER — APPOINTMENT (OUTPATIENT)
Dept: LAB | Facility: HOSPITAL | Age: 70
End: 2025-01-02
Payer: COMMERCIAL

## 2025-01-02 ENCOUNTER — TELEPHONE (OUTPATIENT)
Age: 70
End: 2025-01-02

## 2025-01-02 DIAGNOSIS — E78.2 MIXED HYPERLIPIDEMIA: ICD-10-CM

## 2025-01-02 DIAGNOSIS — E61.1 IRON DEFICIENCY: ICD-10-CM

## 2025-01-02 DIAGNOSIS — E83.42 HYPOMAGNESEMIA: ICD-10-CM

## 2025-01-02 LAB
CHOLEST SERPL-MCNC: 129 MG/DL (ref ?–200)
FERRITIN SERPL-MCNC: 80 NG/ML (ref 11–307)
HDLC SERPL-MCNC: 50 MG/DL
IRON SATN MFR SERPL: 24 % (ref 15–50)
IRON SERPL-MCNC: 98 UG/DL (ref 50–212)
LDLC SERPL CALC-MCNC: 54 MG/DL (ref 0–100)
MAGNESIUM SERPL-MCNC: 1.3 MG/DL (ref 1.9–2.7)
NONHDLC SERPL-MCNC: 79 MG/DL
TIBC SERPL-MCNC: 406 UG/DL (ref 250–450)
TRANSFERRIN SERPL-MCNC: 290 MG/DL (ref 203–362)
TRIGL SERPL-MCNC: 123 MG/DL (ref ?–150)
UIBC SERPL-MCNC: 308 UG/DL (ref 155–355)

## 2025-01-02 PROCEDURE — 83550 IRON BINDING TEST: CPT

## 2025-01-02 PROCEDURE — 80061 LIPID PANEL: CPT

## 2025-01-02 PROCEDURE — 83735 ASSAY OF MAGNESIUM: CPT

## 2025-01-02 PROCEDURE — 83540 ASSAY OF IRON: CPT

## 2025-01-02 PROCEDURE — 82728 ASSAY OF FERRITIN: CPT

## 2025-01-02 PROCEDURE — 36415 COLL VENOUS BLD VENIPUNCTURE: CPT

## 2025-01-02 NOTE — TELEPHONE ENCOUNTER
Called patient to let her know that no one from our office had called her, however was unable to leave message due to her voicemail box not being set up.

## 2025-01-02 NOTE — TELEPHONE ENCOUNTER
Pt stating Dr. Zepeda contacted her this past Monday and would like to know what the call was regarding.  Thank you.

## 2025-01-03 ENCOUNTER — TELEPHONE (OUTPATIENT)
Age: 70
End: 2025-01-03

## 2025-01-03 ENCOUNTER — RESULTS FOLLOW-UP (OUTPATIENT)
Age: 70
End: 2025-01-03

## 2025-01-03 NOTE — TELEPHONE ENCOUNTER
----- Message from Magdalena Mckeon PA-C sent at 1/3/2025  3:02 PM EST -----  Please let the patient know that her cholesterol panel is normal.  Her iron panel is also normal, so she does not need to resume the iron pills.  However her magnesium is low and she needs to resume those pills.

## 2025-01-03 NOTE — TELEPHONE ENCOUNTER
Patient is calling to see if April has had a chance to review her labs.  She would like to hear from someone today please.  Thank you.

## 2025-01-06 NOTE — TELEPHONE ENCOUNTER
Patient called and was advised Magdalena Mckeon PA-C to [facility] Regarding result: Lipid panel     1/3/25  4:17 PM  I'm not aware of anything I can prescribe, but I know there are magnesium powders that can be bought over the counter.  If her magnesium is not an extended release (XL would be in the name), she can crush it and put in apple sauce or a little bit of water/juice.

## 2025-01-07 ENCOUNTER — OFFICE VISIT (OUTPATIENT)
Age: 70
End: 2025-01-07
Payer: COMMERCIAL

## 2025-01-07 ENCOUNTER — TELEPHONE (OUTPATIENT)
Age: 70
End: 2025-01-07

## 2025-01-07 ENCOUNTER — TELEPHONE (OUTPATIENT)
Dept: LAB | Facility: HOSPITAL | Age: 70
End: 2025-01-07

## 2025-01-07 VITALS
WEIGHT: 153 LBS | RESPIRATION RATE: 18 BRPM | HEIGHT: 61 IN | DIASTOLIC BLOOD PRESSURE: 62 MMHG | BODY MASS INDEX: 28.89 KG/M2 | SYSTOLIC BLOOD PRESSURE: 130 MMHG

## 2025-01-07 DIAGNOSIS — K58.0 IRRITABLE BOWEL SYNDROME WITH DIARRHEA: Primary | ICD-10-CM

## 2025-01-07 DIAGNOSIS — K52.9 CHRONIC DIARRHEA: ICD-10-CM

## 2025-01-07 DIAGNOSIS — R19.7 DIARRHEA, UNSPECIFIED TYPE: Primary | ICD-10-CM

## 2025-01-07 PROCEDURE — 99214 OFFICE O/P EST MOD 30 MIN: CPT | Performed by: PHYSICIAN ASSISTANT

## 2025-01-07 RX ORDER — VANCOMYCIN HYDROCHLORIDE 125 MG/1
CAPSULE ORAL
COMMUNITY
End: 2025-01-07 | Stop reason: ALTCHOICE

## 2025-01-07 NOTE — PROGRESS NOTES
Name: Chetna Sandhu      : 1955      MRN: [id number]  Encounter Provider: Kenyetta Lazo PA-C  Encounter Date: 2025   Encounter department: [facility] [place]  :  Assessment & Plan  Irritable bowel syndrome with diarrhea      Chronic diarrhea    Patient has a long history of diarrhea, abdominal cramping, and gas x years. She has a history of IBS-D.  Colonoscopy in 2020 showed a 30mm flat multilobulated polyp in the cecum which was extensive and infiltrating and not able to be removed endoscopically.  She was referred to Dr. Meek and underwent a right hemicolectomy.  Pathology was benign - tubular adenoma - no malignancy.  Biopsies of her colon during the colonoscopy were also negative for microscopic colitis and she had negative celiac serology in the past.  Colonoscopy 2024 was normal except for diverticulosis; terminal ileum normal; bx negative for microscopic colitis.  EGD 2024 showed gastritis, otherwise normal.    She has failed Colestid/Questran, Bentyl, fiber supplementation. She also recently tried a Xifaxan course without benefit.  She however also reports a prior weight loss but is currently stable from her last visit at present. CBC, CMP, TSH normal.    She will have the CT Scan A/P with IV contrast/enterography to further investigate (previously recommended but not completed yet - she will have this done now).  Will check VIP, somatostatin, repeat celiac serology, 24-hr urine 5-HIAA, and pancreatic fecal elastase.  Recommend she gradually decrease her coffee consumption as a potential culprit (she reports drinking coffee throughout the day).  Trial of a low FODMAP diet.  Imodium daily and increase to BID if needed.  She is not a candidate for Viberzi given her cholecystectomy status.  Follow up in 2 months.      History of Present Illness   HPI  Chetna Sandhu is a 69 y.o. female who presents to the office for follow  up.  She reports she continues to have diarrhea multiple times a day.  The Xifaxan did not help.  She has not tried the low FODMAP diet yet.  She does report drinking quite a few cups of coffee throughout the day. Her weight is stable from last OV but she reports she did have weight loss prior to that.    History obtained from: patient.      Pertinent Medical History     {There is no content from the last Pertinent Medical History section.  Medical History Reviewed by provider this encounter:  Meds     .  Current Outpatient Medications on File Prior to Visit   Medication Sig Dispense Refill    atorvastatin (LIPITOR) 20 mg tablet Take 1 tablet (20 mg total) by mouth daily 90 tablet 1    fenofibrate 160 MG tablet Take 1 tablet (160 mg total) by mouth daily 100 tablet 0    furosemide (LASIX) 20 mg tablet Take 1 tablet (20 mg total) by mouth daily 100 tablet 0    glucose blood (OneTouch Ultra) test strip Check blood sugars once daily. Please substitute with appropriate alternative as covered by patient's insurance. Dx: E11.65 100 each 3    ketoconazole (NIZORAL) 2 % cream Apply topically daily 60 g 4    magnesium (MAGTAB) 84 MG (7MEQ) TBCR Take 1 tablet (84 mg total) by mouth daily 100 tablet 1    metoprolol succinate (TOPROL-XL) 25 mg 24 hr tablet Take 1 tablet (25 mg total) by mouth daily 90 tablet 1    nystatin (MYCOSTATIN) powder Apply topically 2 (two) times a day as needed (itching) 60 g 1    omeprazole (PriLOSEC) 20 mg delayed release capsule Take 1 capsule (20 mg total) by mouth daily 100 capsule 0    OneTouch Delica Lancets 33G MISC Check blood glucose daily 100 each 3    Potassium Chloride ER 20 MEQ TBCR Take 1 tablet (20 mEq total) by mouth daily 100 tablet 1    sitaGLIPtin (JANUVIA) 100 mg tablet Take 1 tablet (100 mg total) by mouth daily 90 tablet 1    valsartan (DIOVAN) 320 MG tablet Take 1 tablet (320 mg total) by mouth daily 100 tablet 0    [DISCONTINUED] vancomycin (VANCOCIN) 125 MG capsule        No  "current facility-administered medications on file prior to visit.      Social History     Tobacco Use    Smoking status: Some Days     Average packs/day: 1 pack/day for 52.0 years (52.0 ttl pk-yrs)     Types: Cigarettes     Start date: 1/29/1967    Smokeless tobacco: Never    Tobacco comments:     Few times per week   Vaping Use    Vaping status: Never Used   Substance and Sexual Activity    Alcohol use: Not Currently    Drug use: Never    Sexual activity: Not Currently        Objective   /62   Resp 18   Ht 5' 1\" (1.549 m)   Wt 69.4 kg (153 lb)   BMI 28.91 kg/m²            "

## 2025-01-07 NOTE — TELEPHONE ENCOUNTER
Patient seen in office today and noted that patient is to complete CT scan enterography previously ordered in  which is . Patient requires new order be placed by provider and phone call to update once in Epic.   frequent

## 2025-01-07 NOTE — TELEPHONE ENCOUNTER
Patients GI provider:  JU[facility]    Number to return call: [phone]    Reason for call: Pt called office requesting new order for CT enterography. Order that is in system is old, orders are only good for 1 year. Please contact patient once complete so she can schedule.

## 2025-01-08 ENCOUNTER — TELEPHONE (OUTPATIENT)
Age: 70
End: 2025-01-08

## 2025-01-08 NOTE — TELEPHONE ENCOUNTER
I was asked to chart review. Due to the specific nature of the tests ordered it was advised to get clarification from the provider to accurately answer the patient's questions.

## 2025-01-08 NOTE — TELEPHONE ENCOUNTER
"Patients GI provider:  [facility]    Number to return call: [phone]    Reason for call: Pt calling questioning recent lab tests. Patient was told that her lab tests needed to be completed in the hospital only prior to her CT scan and she is questioning why. Also, Somatostatin order instructions state \"patient should receive at least 300 grams of carbohydrate daily for 3 days before fasting 12 hours\", patient was unaware.   Please contact patient to further discuss.  "

## 2025-01-09 NOTE — TELEPHONE ENCOUNTER
Spoke with pt and reviewed the following:    Order: Vasoactive intestinal peptide (VIP)      (Order ID: [id number])      Diagnosis:  Irritable bowel syndrome with diarrhea (K58.0 [ICD-10-CM])  Priority:  Routine  Comments:This is a patient instruction: This test can ONLY be collected at a [facility]. Patient must not have received radioactive substances 24 hours prior to the test. Patient should receive at least 300 grams of carbohydrate daily for three days before fasting for 12 hours.    Pt is diabetic; unsure about her carb intake and how to manage DM. Will contact PCP.

## 2025-01-09 NOTE — TELEPHONE ENCOUNTER
Patient called stating she is unsure about her carb intake prior to her tests due to being a diabetic. She was told fruit was a safe route but would appreciate a call back from the provider to be sure. Please advise back to further assist. Thank you.

## 2025-01-17 ENCOUNTER — TELEPHONE (OUTPATIENT)
Age: 70
End: 2025-01-17

## 2025-01-17 NOTE — TELEPHONE ENCOUNTER
Patient returning call stated that she got a call from the office not notation on chart of a call today.

## 2025-01-31 ENCOUNTER — TELEPHONE (OUTPATIENT)
Age: 70
End: 2025-01-31

## 2025-01-31 DIAGNOSIS — R73.03 PREDIABETES: ICD-10-CM

## 2025-01-31 DIAGNOSIS — E11.9 TYPE 2 DIABETES MELLITUS WITHOUT COMPLICATION, WITHOUT LONG-TERM CURRENT USE OF INSULIN (HCC): ICD-10-CM

## 2025-01-31 DIAGNOSIS — E11.9 TYPE 2 DIABETES MELLITUS WITHOUT COMPLICATION, WITHOUT LONG-TERM CURRENT USE OF INSULIN (HCC): Primary | ICD-10-CM

## 2025-01-31 RX ORDER — BLOOD SUGAR DIAGNOSTIC
STRIP MISCELLANEOUS
Qty: 100 EACH | Refills: 3 | Status: CANCELLED | OUTPATIENT
Start: 2025-01-31

## 2025-01-31 RX ORDER — BLOOD-GLUCOSE METER
1 KIT MISCELLANEOUS DAILY
Qty: 1 KIT | Refills: 0 | Status: SHIPPED | OUTPATIENT
Start: 2025-01-31 | End: 2025-02-05 | Stop reason: SDUPTHER

## 2025-01-31 RX ORDER — LANCETS 33 GAUGE
EACH MISCELLANEOUS
Qty: 100 EACH | Refills: 3 | Status: CANCELLED | OUTPATIENT
Start: 2025-01-31

## 2025-01-31 NOTE — TELEPHONE ENCOUNTER
Patient called in requesting an order for a new glucose device machine. Patient wanted to clarify it is the actual machine that is giving her trouble and she will need a new one. She currently has One Touch Verio Reflect.      Medication: glucose blood (OneTouch Ultra) test strip     Dose/Frequency:   Check blood sugars once daily. Please substitute with appropriate alternative as covered by patient's insurance. Dx: E11.65           Quantity: 100 each     Pharmacy: [facility] # [address] [place] [address]    Office:   [x] PCP/Provider - Magdalena Mckeon PA-C   [] Speciality/Provider -     Does the patient have enough for 3 days?   [x] Yes   [] No - Send as HP to POD            Medication: OneTouch Delica Lancets 33G MISC     Dose/Frequency: Check blood glucose daily     Quantity:  100 each     Pharmacy: [facility] [id number]    Office:   [x] PCP/Provider - Magdalena Mckeon PA-C   [] Speciality/Provider -     Does the patient have enough for 3 days?   [x] Yes   [] No - Send as HP to POD

## 2025-02-04 RX ORDER — LANCETS 33 GAUGE
EACH MISCELLANEOUS
Qty: 100 EACH | Refills: 3 | OUTPATIENT
Start: 2025-02-04

## 2025-02-04 RX ORDER — BLOOD-GLUCOSE METER
1 KIT MISCELLANEOUS DAILY
Qty: 1 KIT | Refills: 0 | OUTPATIENT
Start: 2025-02-04

## 2025-02-04 RX ORDER — BLOOD SUGAR DIAGNOSTIC
STRIP MISCELLANEOUS
Qty: 100 EACH | Refills: 3 | OUTPATIENT
Start: 2025-02-04

## 2025-02-04 NOTE — TELEPHONE ENCOUNTER
[facility] called stating they need a script for the One Touch Verio test strips and Delica Plus lancets

## 2025-02-04 NOTE — TELEPHONE ENCOUNTER
Patient called stating that she still has not received the glucose monitor kit. Patient also stated that pharmacy did not received a refill request for her lancets and test strips.     Patient is requesting for test strips and lancets to be sent to the pharmacy.     Please advise  Thank you

## 2025-02-05 DIAGNOSIS — E11.9 TYPE 2 DIABETES MELLITUS WITHOUT COMPLICATION, WITHOUT LONG-TERM CURRENT USE OF INSULIN (HCC): ICD-10-CM

## 2025-02-05 DIAGNOSIS — E11.9 TYPE 2 DIABETES MELLITUS WITHOUT COMPLICATION, WITHOUT LONG-TERM CURRENT USE OF INSULIN (HCC): Primary | ICD-10-CM

## 2025-02-05 DIAGNOSIS — R73.03 PREDIABETES: ICD-10-CM

## 2025-02-05 RX ORDER — BLOOD-GLUCOSE METER
1 KIT MISCELLANEOUS DAILY
Qty: 1 KIT | Refills: 0 | Status: SHIPPED | OUTPATIENT
Start: 2025-02-05

## 2025-02-05 RX ORDER — BLOOD-GLUCOSE METER
EACH MISCELLANEOUS DAILY
Qty: 1 KIT | Refills: 0 | Status: SHIPPED | OUTPATIENT
Start: 2025-02-05

## 2025-02-05 RX ORDER — BLOOD SUGAR DIAGNOSTIC
STRIP MISCELLANEOUS
Qty: 100 EACH | Refills: 3 | Status: SHIPPED | OUTPATIENT
Start: 2025-02-05

## 2025-02-05 RX ORDER — LANCETS 33 GAUGE
EACH MISCELLANEOUS
Qty: 100 EACH | Refills: 3 | Status: SHIPPED | OUTPATIENT
Start: 2025-02-05

## 2025-02-11 ENCOUNTER — TELEPHONE (OUTPATIENT)
Age: 70
End: 2025-02-11

## 2025-02-11 NOTE — TELEPHONE ENCOUNTER
Pt called to schedule an appointment with PCP. Pt states she has to discuss something with her.      Pt scheduled for Thursday, 2/20/25 at 8:20 am with PCP.    Pt will bring along insurance cards and photo ID to appointment.

## 2025-02-13 DIAGNOSIS — I10 PRIMARY HYPERTENSION: ICD-10-CM

## 2025-02-13 RX ORDER — FUROSEMIDE 20 MG/1
20 TABLET ORAL DAILY
Qty: 100 TABLET | Refills: 0 | Status: SHIPPED | OUTPATIENT
Start: 2025-02-13 | End: 2025-02-20 | Stop reason: SDUPTHER

## 2025-02-13 NOTE — TELEPHONE ENCOUNTER
Patient needs a refill of the following medication:    Furosemide (LASIX) 20 mg tablet  Take one tablet (20 mg total) by mouth daily    Medication can be sent to verified pharmacy on file.    Patient doesn't have any left.    Please advise, thank you.

## 2025-02-20 ENCOUNTER — OFFICE VISIT (OUTPATIENT)
Age: 70
End: 2025-02-20
Payer: COMMERCIAL

## 2025-02-20 VITALS
HEIGHT: 61 IN | SYSTOLIC BLOOD PRESSURE: 147 MMHG | DIASTOLIC BLOOD PRESSURE: 62 MMHG | BODY MASS INDEX: 29.6 KG/M2 | WEIGHT: 156.8 LBS | TEMPERATURE: 98.9 F | HEART RATE: 77 BPM | OXYGEN SATURATION: 98 % | RESPIRATION RATE: 16 BRPM

## 2025-02-20 DIAGNOSIS — F32.0 CURRENT MILD EPISODE OF MAJOR DEPRESSIVE DISORDER, UNSPECIFIED WHETHER RECURRENT (HCC): Primary | ICD-10-CM

## 2025-02-20 DIAGNOSIS — Z23 ENCOUNTER FOR IMMUNIZATION: ICD-10-CM

## 2025-02-20 DIAGNOSIS — L73.9 FOLLICULITIS: ICD-10-CM

## 2025-02-20 DIAGNOSIS — E83.42 HYPOMAGNESEMIA: ICD-10-CM

## 2025-02-20 DIAGNOSIS — E78.2 MIXED HYPERLIPIDEMIA: ICD-10-CM

## 2025-02-20 DIAGNOSIS — T74.A1XA: ICD-10-CM

## 2025-02-20 DIAGNOSIS — J43.2 CENTRILOBULAR EMPHYSEMA (HCC): ICD-10-CM

## 2025-02-20 DIAGNOSIS — I10 PRIMARY HYPERTENSION: ICD-10-CM

## 2025-02-20 DIAGNOSIS — E11.22 TYPE 2 DIABETES MELLITUS WITH STAGE 3 CHRONIC KIDNEY DISEASE, WITHOUT LONG-TERM CURRENT USE OF INSULIN, UNSPECIFIED WHETHER STAGE 3A OR 3B CKD (HCC): ICD-10-CM

## 2025-02-20 DIAGNOSIS — N18.30 TYPE 2 DIABETES MELLITUS WITH STAGE 3 CHRONIC KIDNEY DISEASE, WITHOUT LONG-TERM CURRENT USE OF INSULIN, UNSPECIFIED WHETHER STAGE 3A OR 3B CKD (HCC): ICD-10-CM

## 2025-02-20 DIAGNOSIS — G47.00 INSOMNIA, UNSPECIFIED TYPE: ICD-10-CM

## 2025-02-20 PROBLEM — R53.83 FATIGUE: Status: RESOLVED | Noted: 2022-02-07 | Resolved: 2025-02-20

## 2025-02-20 PROBLEM — B37.2 SKIN YEAST INFECTION: Status: RESOLVED | Noted: 2022-03-22 | Resolved: 2025-02-20

## 2025-02-20 PROBLEM — D72.829 LEUKOCYTOSIS: Status: RESOLVED | Noted: 2022-06-20 | Resolved: 2025-02-20

## 2025-02-20 PROBLEM — N18.31 STAGE 3A CHRONIC KIDNEY DISEASE (HCC): Status: ACTIVE | Noted: 2021-06-21

## 2025-02-20 PROBLEM — N90.7 INCLUSION CYST OF VULVA: Status: RESOLVED | Noted: 2022-03-22 | Resolved: 2025-02-20

## 2025-02-20 PROCEDURE — 90662 IIV NO PRSV INCREASED AG IM: CPT

## 2025-02-20 PROCEDURE — G2211 COMPLEX E/M VISIT ADD ON: HCPCS

## 2025-02-20 PROCEDURE — 99214 OFFICE O/P EST MOD 30 MIN: CPT

## 2025-02-20 PROCEDURE — G0008 ADMIN INFLUENZA VIRUS VAC: HCPCS

## 2025-02-20 RX ORDER — VALSARTAN 320 MG/1
320 TABLET ORAL DAILY
Qty: 100 TABLET | Refills: 0 | Status: SHIPPED | OUTPATIENT
Start: 2025-02-20 | End: 2025-05-31

## 2025-02-20 RX ORDER — ATORVASTATIN CALCIUM 20 MG/1
20 TABLET, FILM COATED ORAL DAILY
Qty: 90 TABLET | Refills: 1 | Status: SHIPPED | OUTPATIENT
Start: 2025-02-20

## 2025-02-20 RX ORDER — MAGNESIUM L-LACTATE 84 MG
84 TABLET, EXTENDED RELEASE ORAL DAILY
Qty: 100 TABLET | Refills: 1 | Status: SHIPPED | OUTPATIENT
Start: 2025-02-20 | End: 2025-09-08

## 2025-02-20 RX ORDER — FUROSEMIDE 20 MG/1
20 TABLET ORAL DAILY
Qty: 100 TABLET | Refills: 0 | Status: SHIPPED | OUTPATIENT
Start: 2025-02-20

## 2025-02-20 RX ORDER — METOPROLOL SUCCINATE 25 MG/1
25 TABLET, EXTENDED RELEASE ORAL DAILY
Qty: 90 TABLET | Refills: 1 | Status: SHIPPED | OUTPATIENT
Start: 2025-02-20

## 2025-02-20 RX ORDER — TRAZODONE HYDROCHLORIDE 50 MG/1
50 TABLET, FILM COATED ORAL
Qty: 30 TABLET | Refills: 0 | Status: SHIPPED | OUTPATIENT
Start: 2025-02-20

## 2025-02-20 RX ORDER — DOXYCYCLINE HYCLATE 100 MG
100 TABLET ORAL 2 TIMES DAILY
Qty: 60 TABLET | Refills: 2 | Status: SHIPPED | OUTPATIENT
Start: 2025-02-20 | End: 2025-05-21

## 2025-02-20 RX ORDER — FENOFIBRATE 160 MG/1
160 TABLET ORAL DAILY
Qty: 100 TABLET | Refills: 0 | Status: SHIPPED | OUTPATIENT
Start: 2025-02-20 | End: 2025-05-31

## 2025-02-20 NOTE — ASSESSMENT & PLAN NOTE
Orders:  •  metoprolol succinate (TOPROL-XL) 25 mg 24 hr tablet; Take 1 tablet (25 mg total) by mouth daily  •  furosemide (LASIX) 20 mg tablet; Take 1 tablet (20 mg total) by mouth daily  •  valsartan (DIOVAN) 320 MG tablet; Take 1 tablet (320 mg total) by mouth daily

## 2025-02-20 NOTE — ASSESSMENT & PLAN NOTE
Elevated today, patient in obvious distress over the financial situation.  Continue on current medications for now and will monitor in the future.  Orders:  •  metoprolol succinate (TOPROL-XL) 25 mg 24 hr tablet; Take 1 tablet (25 mg total) by mouth daily  •  furosemide (LASIX) 20 mg tablet; Take 1 tablet (20 mg total) by mouth daily  •  valsartan (DIOVAN) 320 MG tablet; Take 1 tablet (320 mg total) by mouth daily
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
## 2025-02-20 NOTE — ASSESSMENT & PLAN NOTE
Most recent labs reviewed, stable on current doses of medication.  Needs to quit smoking to reduce her CVD risk.  Orders:  •  atorvastatin (LIPITOR) 20 mg tablet; Take 1 tablet (20 mg total) by mouth daily  •  fenofibrate 160 MG tablet; Take 1 tablet (160 mg total) by mouth daily

## 2025-02-20 NOTE — ASSESSMENT & PLAN NOTE
Depression Screening Follow-up Plan: Patient's depression screening was positive with a PHQ-9 score of 8.     Review of records show she had been on Lexapro in the past, but stopped this on her own.  She also was on Buspar that she eventually stopped on her own.  Will start the pt on Zoloft 50 mg a day.  Orders:  •  sertraline (ZOLOFT) 50 mg tablet; Take 1 tablet (50 mg total) by mouth daily

## 2025-02-21 ENCOUNTER — TELEPHONE (OUTPATIENT)
Age: 70
End: 2025-02-21

## 2025-02-21 NOTE — TELEPHONE ENCOUNTER
----- Message from Magdalena Mckeon PA-C sent at 2/20/2025  6:37 PM EST -----  Regarding: Medications  Please let Chetna know I sent in a prescription for Zoloft 50 mg a day.  She should take this once a day.  It will take 4-6 weeks to notice effects.  Side effects could be some stomach upset, but this usually gets better as she is on it longer.  She can cut the pill in half if it is too strong at first and then increase it to a full pill a couple weeks later.    I also sent in Trazodone to use at bedtime.  This takes about an hour to work.  It can cause drowsiness the next day, but I put her on the lowest dose to see if this is enough to work.    Thank you.

## 2025-02-24 ENCOUNTER — TELEPHONE (OUTPATIENT)
Age: 70
End: 2025-02-24

## 2025-02-24 NOTE — TELEPHONE ENCOUNTER
Medication Magnesium Lactate 84 mg     Keycode [id number]    Pharmacy [facility] [phone]     Comments : paperwork scanned in media

## 2025-02-25 NOTE — TELEPHONE ENCOUNTER
Called patient. And was notified and has not started the trazodone , she just got it and will start tonight as she had no way to get there to pick it up

## 2025-02-25 NOTE — TELEPHONE ENCOUNTER
PA for magnesium (MAGTAB) 84 MG (7MEQ) TBCR SUBMITTED to     via    [x][facility]-KEY: [id number]  []Surescripts-Case ID #   []Availity-Auth ID # NDC #   []Faxed to plan   []Other website   []Phone call Case ID #     [x]PA sent as URGENT    All office notes, labs and other pertaining documents and studies sent. Clinical questions answered. Awaiting determination from insurance company.     Turnaround time for your insurance to make a decision on your Prior Authorization can take 7-21 business days.

## 2025-02-25 NOTE — TELEPHONE ENCOUNTER
PA for magnesium (MAGTAB) 84 MG (7MEQ) TBCR  DENIED    Reason:(Screenshot if applicable)        Message sent to office clinical pool Yes    Denial letter scanned into Media Yes    Appeal started No (Provider will need to decide if appeal is warranted and send clinical documentation to Prior Authorization Team for initiation.)    **Please follow up with your patient regarding denial and next steps**

## 2025-02-26 ENCOUNTER — OFFICE VISIT (OUTPATIENT)
Age: 70
End: 2025-02-26
Payer: COMMERCIAL

## 2025-02-26 ENCOUNTER — TELEPHONE (OUTPATIENT)
Age: 70
End: 2025-02-26

## 2025-02-26 VITALS
HEIGHT: 61 IN | TEMPERATURE: 97.9 F | HEART RATE: 79 BPM | OXYGEN SATURATION: 95 % | DIASTOLIC BLOOD PRESSURE: 70 MMHG | SYSTOLIC BLOOD PRESSURE: 140 MMHG | BODY MASS INDEX: 29.45 KG/M2 | WEIGHT: 156 LBS

## 2025-02-26 DIAGNOSIS — L73.9 FOLLICULITIS: ICD-10-CM

## 2025-02-26 PROCEDURE — 99204 OFFICE O/P NEW MOD 45 MIN: CPT | Performed by: STUDENT IN AN ORGANIZED HEALTH CARE EDUCATION/TRAINING PROGRAM

## 2025-02-26 RX ORDER — CLINDAMYCIN PHOSPHATE 11.9 MG/ML
SOLUTION TOPICAL 2 TIMES DAILY
Qty: 60 ML | Refills: 2 | Status: SHIPPED | OUTPATIENT
Start: 2025-02-26

## 2025-02-26 NOTE — PATIENT INSTRUCTIONS
FOLLICULITIS OF SCALP    Additional History of Present Condition:  present for years    Assessment and Plan:  Based on a thorough discussion of this condition and the management approach to it (including a comprehensive discussion of the known risks, side effects and potential benefits of treatment), the patient (family) agrees to implement the following specific plan:  T-Sal Shampoo Wash Scalp 2-3 weekly  Clindamycin Solution daily

## 2025-02-26 NOTE — PROGRESS NOTES
"[facility] Dermatology Clinic Note     Patient Name: Chetna Sandhu  Encounter Date: 02/26/2025     Have you been cared for by a [facility] Dermatologist in the last 3 years and, if so, which description applies to you?    NO.   I am considered a \"new\" patient and must complete all patient intake questions. I am FEMALE/of child-bearing potential.    REVIEW OF SYSTEMS:  Have you recently had or currently have any of the following? Recent fever or chills? No  Any non-healing wound? No  Are you pregnant or planning to become pregnant? No  Are you currently or planning to be nursing or breast feeding? No   PAST MEDICAL HISTORY:  Have you personally ever had or currently have any of the following?  If \"YES,\" then please provide more detail. Skin cancer (such as Melanoma, Basal Cell Carcinoma, Squamous Cell Carcinoma?  No  Tuberculosis, HIV/AIDS, Hepatitis B or C: No  Radiation Treatment No   HISTORY OF IMMUNOSUPPRESSION:   Do you have a history of any of the following:  Systemic Immunosuppression such as Diabetes, Biologic or Immunotherapy, Chemotherapy, Organ Transplantation, Bone Marrow Transplantation or Prednsione?  No    Answering \"YES\" requires the addition of the dotphrase \"IMMUNOSUPPRESSED\" as the first diagnosis of the patient's visit.   FAMILY HISTORY:  Any \"first degree relatives\" (parent, brother, sister, or child) with the following?    Skin Cancer, Pancreatic or Other Cancer? No   PATIENT EXPERIENCE:    Do you want the Dermatologist to perform a COMPLETE skin exam today including a clinical examination under the \"bra and underwear\" areas?  NO  If necessary, do we have your permission to call and leave a detailed message on your Preferred Phone number that includes your specific medical information?  Yes      Allergies   Allergen Reactions   • Bacitracin-Polymyxin B Other (See Comments)   • Ciprofloxacin Rash   • Codeine Rash      Current Outpatient Medications:   •  atorvastatin (LIPITOR) 20 mg tablet, " Take 1 tablet (20 mg total) by mouth daily, Disp: 90 tablet, Rfl: 1  •  Blood Glucose Monitoring Suppl (ONE TOUCH ULTRA 2) w/Device KIT, Use in the morning Test blood sugar daily, Disp: 1 kit, Rfl: 0  •  Blood Glucose Monitoring Suppl (OneTouch Verio Reflect) w/Device KIT, Use 1 each in the morning, Disp: 1 kit, Rfl: 0  •  clindamycin (CLEOCIN T) 1 % external solution, Apply topically 2 (two) times a day, Disp: 60 mL, Rfl: 2  •  doxycycline hyclate (VIBRA-TABS) 100 mg tablet, Take 1 tablet (100 mg total) by mouth 2 (two) times a day, Disp: 60 tablet, Rfl: 2  •  fenofibrate 160 MG tablet, Take 1 tablet (160 mg total) by mouth daily, Disp: 100 tablet, Rfl: 0  •  furosemide (LASIX) 20 mg tablet, Take 1 tablet (20 mg total) by mouth daily, Disp: 100 tablet, Rfl: 0  •  glucose blood (OneTouch Ultra) test strip, Check blood sugars once daily. Please substitute with appropriate alternative as covered by patient's insurance. Dx: E11.65, Disp: 100 each, Rfl: 3  •  ketoconazole (NIZORAL) 2 % cream, Apply topically daily, Disp: 60 g, Rfl: 4  •  magnesium (MAGTAB) 84 MG (7MEQ) TBCR, Take 1 tablet (84 mg total) by mouth daily, Disp: 100 tablet, Rfl: 1  •  metoprolol succinate (TOPROL-XL) 25 mg 24 hr tablet, Take 1 tablet (25 mg total) by mouth daily, Disp: 90 tablet, Rfl: 1  •  nystatin (MYCOSTATIN) powder, Apply topically 2 (two) times a day as needed (itching), Disp: 60 g, Rfl: 1  •  OneTouch Delica Lancets 33G MISC, Check blood glucose daily, Disp: 100 each, Rfl: 3  •  Potassium Chloride ER 20 MEQ TBCR, Take 1 tablet (20 mEq total) by mouth daily, Disp: 100 tablet, Rfl: 1  •  sertraline (ZOLOFT) 50 mg tablet, Take 1 tablet (50 mg total) by mouth daily, Disp: 90 tablet, Rfl: 1  •  sitaGLIPtin (JANUVIA) 100 mg tablet, Take 1 tablet (100 mg total) by mouth daily, Disp: 90 tablet, Rfl: 1  •  traZODone (DESYREL) 50 mg tablet, Take 1 tablet (50 mg total) by mouth daily at bedtime, Disp: 30 tablet, Rfl: 0  •  valsartan (DIOVAN) 320  MG tablet, Take 1 tablet (320 mg total) by mouth daily, Disp: 100 tablet, Rfl: 0          Whom besides the patient is providing clinical information about today's encounter?   NO ADDITIONAL HISTORIAN (patient alone provided history)    Physical Exam and Assessment/Plan by Diagnosis:    Chief complaint: Patient is a 70 y/o female present for bumps on the scalp which started years ago and becomes itchy and has not tried any prior treatments.    FOLLICULITIS OF SCALP    Physical Exam:  (Anatomic Location); (Size and Morphological Description); (Differential Diagnosis):  Scalp with scattered pinpoint papules and pustules   Pertinent Positives:  Pertinent Negatives:    Additional History of Present Condition:  present for years    Assessment and Plan:  Based on a thorough discussion of this condition and the management approach to it (including a comprehensive discussion of the known risks, side effects and potential benefits of treatment), the patient (family) agrees to implement the following specific plan:  T-Sal Shampoo Wash Scalp 2-3 weekly  Clindamycin Solution daily      Scribe Attestation    I,:  Ratna Cortes MA am acting as a scribe while in the presence of the attending physician.:       I,:  Donovan Greer DO personally performed the services described in this documentation    as scribed in my presence.:

## 2025-02-26 NOTE — TELEPHONE ENCOUNTER
Patient called checking on a status on an antibiotics that was discussed at today's visit.  She stated it was discussed to receive a shampoo, and an antibiotics  I advised of the visit notes and how we sometimes sign off at end of day but she wants to make sure she understood correctly and an oral antibiotics will be send.  Thank you

## 2025-03-05 DIAGNOSIS — L73.9 FOLLICULITIS: ICD-10-CM

## 2025-03-05 RX ORDER — CLINDAMYCIN PHOSPHATE 11.9 MG/ML
SOLUTION TOPICAL 2 TIMES DAILY
Qty: 60 ML | Refills: 2 | Status: CANCELLED | OUTPATIENT
Start: 2025-03-05

## 2025-03-05 NOTE — TELEPHONE ENCOUNTER
Reason for call:     [facility] calling for [facility]ill    [x] Refill   [] Prior Auth  [] Other:     Office:   [] PCP/Provider -   [x] Specialty/Provider - Percy Carlos    Medication:   Clindamycin 1 % solution    Pharmacy:   [facility]

## 2025-03-06 DIAGNOSIS — L73.9 FOLLICULITIS: ICD-10-CM

## 2025-03-06 DIAGNOSIS — E11.22 TYPE 2 DIABETES MELLITUS WITH STAGE 3A CHRONIC KIDNEY DISEASE, WITHOUT LONG-TERM CURRENT USE OF INSULIN (HCC): ICD-10-CM

## 2025-03-06 DIAGNOSIS — E78.2 MIXED HYPERLIPIDEMIA: ICD-10-CM

## 2025-03-06 DIAGNOSIS — I10 PRIMARY HYPERTENSION: ICD-10-CM

## 2025-03-06 DIAGNOSIS — F32.0 CURRENT MILD EPISODE OF MAJOR DEPRESSIVE DISORDER, UNSPECIFIED WHETHER RECURRENT (HCC): ICD-10-CM

## 2025-03-06 DIAGNOSIS — N18.31 TYPE 2 DIABETES MELLITUS WITH STAGE 3A CHRONIC KIDNEY DISEASE, WITHOUT LONG-TERM CURRENT USE OF INSULIN (HCC): ICD-10-CM

## 2025-03-06 DIAGNOSIS — E83.42 HYPOMAGNESEMIA: ICD-10-CM

## 2025-03-06 DIAGNOSIS — G47.00 INSOMNIA, UNSPECIFIED TYPE: ICD-10-CM

## 2025-03-06 DIAGNOSIS — B35.9 TINEA: ICD-10-CM

## 2025-03-06 DIAGNOSIS — R73.03 PREDIABETES: ICD-10-CM

## 2025-03-06 RX ORDER — BLOOD SUGAR DIAGNOSTIC
STRIP MISCELLANEOUS
Qty: 100 EACH | Refills: 3 | Status: SHIPPED | OUTPATIENT
Start: 2025-03-06

## 2025-03-06 RX ORDER — VIT C/B6/B5/MAGNESIUM/HERB 173 50-5-6-5MG
1 CAPSULE ORAL DAILY
Qty: 100 CAPSULE | Refills: 1 | Status: SHIPPED | OUTPATIENT
Start: 2025-03-06 | End: 2025-09-22

## 2025-03-06 RX ORDER — VALSARTAN 320 MG/1
320 TABLET ORAL DAILY
Qty: 100 TABLET | Refills: 1 | Status: SHIPPED | OUTPATIENT
Start: 2025-03-06 | End: 2025-09-22

## 2025-03-06 RX ORDER — FENOFIBRATE 160 MG/1
160 TABLET ORAL DAILY
Qty: 100 TABLET | Refills: 1 | Status: SHIPPED | OUTPATIENT
Start: 2025-03-06 | End: 2025-06-14

## 2025-03-06 RX ORDER — FUROSEMIDE 20 MG/1
20 TABLET ORAL DAILY
Qty: 100 TABLET | Refills: 1 | Status: SHIPPED | OUTPATIENT
Start: 2025-03-06

## 2025-03-06 RX ORDER — POTASSIUM CHLORIDE 1500 MG/1
1 TABLET, EXTENDED RELEASE ORAL DAILY
Qty: 100 TABLET | Refills: 1 | Status: SHIPPED | OUTPATIENT
Start: 2025-03-06

## 2025-03-06 RX ORDER — LANCETS 33 GAUGE
EACH MISCELLANEOUS
Qty: 100 EACH | Refills: 3 | Status: SHIPPED | OUTPATIENT
Start: 2025-03-06

## 2025-03-06 RX ORDER — TRAZODONE HYDROCHLORIDE 50 MG/1
50 TABLET ORAL
Qty: 30 TABLET | Refills: 0 | Status: SHIPPED | OUTPATIENT
Start: 2025-03-06

## 2025-03-06 RX ORDER — KETOCONAZOLE 20 MG/G
CREAM TOPICAL DAILY
Qty: 60 G | Refills: 4 | Status: SHIPPED | OUTPATIENT
Start: 2025-03-06

## 2025-03-06 RX ORDER — MAGNESIUM L-LACTATE 84 MG
84 TABLET, EXTENDED RELEASE ORAL DAILY
Qty: 100 TABLET | Refills: 1 | Status: SHIPPED | OUTPATIENT
Start: 2025-03-06 | End: 2025-09-22

## 2025-03-06 RX ORDER — DOXYCYCLINE HYCLATE 100 MG
100 TABLET ORAL 2 TIMES DAILY
Qty: 60 TABLET | Refills: 2 | Status: CANCELLED | OUTPATIENT
Start: 2025-03-06 | End: 2025-06-04

## 2025-03-06 RX ORDER — ATORVASTATIN CALCIUM 20 MG/1
20 TABLET, FILM COATED ORAL DAILY
Qty: 90 TABLET | Refills: 1 | Status: SHIPPED | OUTPATIENT
Start: 2025-03-06

## 2025-03-06 RX ORDER — METOPROLOL SUCCINATE 25 MG/1
25 TABLET, EXTENDED RELEASE ORAL DAILY
Qty: 90 TABLET | Refills: 1 | Status: SHIPPED | OUTPATIENT
Start: 2025-03-06

## 2025-03-07 DIAGNOSIS — L73.9 FOLLICULITIS: ICD-10-CM

## 2025-03-07 NOTE — TELEPHONE ENCOUNTER
Reason for call:   [x] Refill   [] Prior Auth  [] Other:     Office:   [] PCP/Provider -   [x] Specialty/Provider - derm/Greer    Medication: Clindamycin 1%    Dose/Frequency: apply bid    Quantity: 180    Pharmacy: [facility] [address] [phone]     Mail Away Pharmacy   Does the patient have enough for 10 days?   [] Yes   [x] No - Send as HP to POD

## 2025-03-11 RX ORDER — CLINDAMYCIN PHOSPHATE 11.9 MG/ML
SOLUTION TOPICAL 2 TIMES DAILY
Qty: 180 ML | Refills: 1 | Status: SHIPPED | OUTPATIENT
Start: 2025-03-11

## 2025-03-17 ENCOUNTER — TELEPHONE (OUTPATIENT)
Age: 70
End: 2025-03-17

## 2025-03-17 DIAGNOSIS — F32.0 CURRENT MILD EPISODE OF MAJOR DEPRESSIVE DISORDER, UNSPECIFIED WHETHER RECURRENT (HCC): ICD-10-CM

## 2025-03-17 DIAGNOSIS — N18.31 TYPE 2 DIABETES MELLITUS WITH STAGE 3A CHRONIC KIDNEY DISEASE, WITHOUT LONG-TERM CURRENT USE OF INSULIN (HCC): ICD-10-CM

## 2025-03-17 DIAGNOSIS — B35.9 TINEA: ICD-10-CM

## 2025-03-17 DIAGNOSIS — E11.22 TYPE 2 DIABETES MELLITUS WITH STAGE 3A CHRONIC KIDNEY DISEASE, WITHOUT LONG-TERM CURRENT USE OF INSULIN (HCC): ICD-10-CM

## 2025-03-17 DIAGNOSIS — R73.03 PREDIABETES: ICD-10-CM

## 2025-03-17 DIAGNOSIS — E83.42 HYPOMAGNESEMIA: ICD-10-CM

## 2025-03-17 DIAGNOSIS — I10 PRIMARY HYPERTENSION: ICD-10-CM

## 2025-03-17 DIAGNOSIS — E11.9 TYPE 2 DIABETES MELLITUS WITHOUT COMPLICATION, WITHOUT LONG-TERM CURRENT USE OF INSULIN (HCC): ICD-10-CM

## 2025-03-17 RX ORDER — VIT C/B6/B5/MAGNESIUM/HERB 173 50-5-6-5MG
1 CAPSULE ORAL DAILY
Qty: 100 CAPSULE | Refills: 1 | Status: SHIPPED | OUTPATIENT
Start: 2025-03-17 | End: 2025-10-03

## 2025-03-17 RX ORDER — POTASSIUM CHLORIDE 1500 MG/1
1 TABLET, EXTENDED RELEASE ORAL DAILY
Qty: 100 TABLET | Refills: 1 | Status: SHIPPED | OUTPATIENT
Start: 2025-03-17

## 2025-03-17 RX ORDER — LANCETS 33 GAUGE
EACH MISCELLANEOUS
Qty: 100 EACH | Refills: 3 | Status: SHIPPED | OUTPATIENT
Start: 2025-03-17

## 2025-03-17 RX ORDER — BLOOD-GLUCOSE METER
EACH MISCELLANEOUS DAILY
Qty: 1 KIT | Refills: 0 | Status: SHIPPED | OUTPATIENT
Start: 2025-03-17

## 2025-03-17 RX ORDER — MAGNESIUM L-LACTATE 84 MG
84 TABLET, EXTENDED RELEASE ORAL DAILY
Qty: 100 TABLET | Refills: 1 | Status: SHIPPED | OUTPATIENT
Start: 2025-03-17 | End: 2025-10-03

## 2025-03-17 RX ORDER — VALSARTAN 320 MG/1
320 TABLET ORAL DAILY
Qty: 100 TABLET | Refills: 1 | Status: SHIPPED | OUTPATIENT
Start: 2025-03-17 | End: 2025-10-03

## 2025-03-17 NOTE — TELEPHONE ENCOUNTER
[facility] medications but they are waiting for some to come over. Looks like Potassium Chloride , Valsartan , lancets 35 gauge, Coal tar salicylic shampoo, tumeric, magnesium, & januvia went to [facility] pharmacy and it should of went to [facility] pharmacy which is her mail order pharmacy.   They were asking for the ultra 2 glucose monitor script to but that was sent to [facility] on 2/5/2025 not sure if patient got that already.  Please advise. Thank you.

## 2025-03-21 ENCOUNTER — TELEPHONE (OUTPATIENT)
Age: 70
End: 2025-03-21

## 2025-03-26 ENCOUNTER — APPOINTMENT (OUTPATIENT)
Age: 70
End: 2025-03-26
Payer: COMMERCIAL

## 2025-03-26 ENCOUNTER — TELEPHONE (OUTPATIENT)
Dept: ADMINISTRATIVE | Facility: OTHER | Age: 70
End: 2025-03-26

## 2025-03-26 ENCOUNTER — OFFICE VISIT (OUTPATIENT)
Age: 70
End: 2025-03-26
Payer: COMMERCIAL

## 2025-03-26 VITALS
TEMPERATURE: 95.9 F | HEIGHT: 61 IN | HEART RATE: 76 BPM | DIASTOLIC BLOOD PRESSURE: 66 MMHG | OXYGEN SATURATION: 98 % | BODY MASS INDEX: 29.04 KG/M2 | RESPIRATION RATE: 14 BRPM | WEIGHT: 153.8 LBS | SYSTOLIC BLOOD PRESSURE: 140 MMHG

## 2025-03-26 DIAGNOSIS — M54.42 ACUTE BILATERAL LOW BACK PAIN WITH BILATERAL SCIATICA: ICD-10-CM

## 2025-03-26 DIAGNOSIS — R60.0 LOCALIZED EDEMA: ICD-10-CM

## 2025-03-26 DIAGNOSIS — M54.41 ACUTE BILATERAL LOW BACK PAIN WITH BILATERAL SCIATICA: Primary | ICD-10-CM

## 2025-03-26 DIAGNOSIS — M54.42 ACUTE BILATERAL LOW BACK PAIN WITH BILATERAL SCIATICA: Primary | ICD-10-CM

## 2025-03-26 DIAGNOSIS — F32.0 CURRENT MILD EPISODE OF MAJOR DEPRESSIVE DISORDER, UNSPECIFIED WHETHER RECURRENT (HCC): ICD-10-CM

## 2025-03-26 DIAGNOSIS — M54.41 ACUTE BILATERAL LOW BACK PAIN WITH BILATERAL SCIATICA: ICD-10-CM

## 2025-03-26 PROCEDURE — G2211 COMPLEX E/M VISIT ADD ON: HCPCS

## 2025-03-26 PROCEDURE — 72114 X-RAY EXAM L-S SPINE BENDING: CPT

## 2025-03-26 PROCEDURE — 99214 OFFICE O/P EST MOD 30 MIN: CPT

## 2025-03-26 RX ORDER — DOXYCYCLINE HYCLATE 100 MG
1 TABLET ORAL 2 TIMES DAILY
COMMUNITY
Start: 2025-03-14

## 2025-03-26 RX ORDER — METHOCARBAMOL 500 MG/1
500 TABLET, FILM COATED ORAL 2 TIMES DAILY PRN
Qty: 30 TABLET | Refills: 0 | Status: SHIPPED | OUTPATIENT
Start: 2025-03-26 | End: 2025-04-25

## 2025-03-26 NOTE — LETTER
Diabetic Eye Exam Form    Date Requested: 25  Patient: Chetna Sandhu  Patient : 1955   Referring Provider: Magdalena Mckeon PA-C      DIABETIC Eye Exam Date _______________________________      Type of Exam MUST be documented for Diabetic Eye Exams. Please CHECK ONE.     Retinal Exam       Dilated Retinal Exam       OCT       Optomap-Iris Exam      Fundus Photography       Left Eye - Please check Retinopathy or No Retinopathy        Exam did show retinopathy    Exam did not show retinopathy       Right Eye - Please check Retinopathy or No Retinopathy       Exam did show retinopathy    Exam did not show retinopathy       Comments __________________________________________________________    Practice Providing Exam ______________________________________________    Exam Performed By (print name) _______________________________________      Provider Signature ___________________________________________________      These reports are needed for  compliance.    Please fax this completed form and a copy of the Diabetic Eye Exam report to the [facility] Department as soon as possible via Fax [phone], yazmin Peñaloza: Phone [phone]. Our office is located at [address] [place], [place] [address] [facility].     We thank you for your assistance in treating our mutual patient.

## 2025-03-26 NOTE — LETTER
Diabetic Eye Exam Form 2nd Request! Please fax tk u!    Date Requested: 25  Patient: Chetna Sandhu  Patient : 1955   Referring Provider: Magdalena Mckeon PA-C      DIABETIC Eye Exam Date _______________________________      Type of Exam MUST be documented for Diabetic Eye Exams. Please CHECK ONE.     Retinal Exam       Dilated Retinal Exam       OCT       Optomap-Iris Exam      Fundus Photography       Left Eye - Please check Retinopathy or No Retinopathy        Exam did show retinopathy    Exam did not show retinopathy       Right Eye - Please check Retinopathy or No Retinopathy       Exam did show retinopathy    Exam did not show retinopathy       Comments __________________________________________________________    Practice Providing Exam ______________________________________________    Exam Performed By (print name) _______________________________________      Provider Signature ___________________________________________________      These reports are needed for  compliance.    Please fax this completed form and a copy of the Diabetic Eye Exam report to the [facility] Based Department as soon as possible via Fax [phone], yazmin Peñaloza: Phone [phone]. Our office is located at [address] [place] [address].     We thank you for your assistance in treating our mutual patient.

## 2025-03-26 NOTE — TELEPHONE ENCOUNTER
Upon review of the In Basket request and the patient's chart, initial outreach has been made via fax to facility. Please see Contacts section for details.     Thank you  Anabela Jarrett MA

## 2025-03-26 NOTE — LETTER
Diabetic Eye Exam Form    Date Requested: 25  Patient: Chetna Sandhu  Patient : 1955   Referring Provider: Magdalena Mckeon PA-C      DIABETIC Eye Exam Date _______________________________      Type of Exam MUST be documented for Diabetic Eye Exams. Please CHECK ONE.     Retinal Exam       Dilated Retinal Exam       OCT       Optomap-Iris Exam      Fundus Photography       Left Eye - Please check Retinopathy or No Retinopathy        Exam did show retinopathy    Exam did not show retinopathy       Right Eye - Please check Retinopathy or No Retinopathy       Exam did show retinopathy    Exam did not show retinopathy       Comments __________________________________________________________    Practice Providing Exam ______________________________________________    Exam Performed By (print name) _______________________________________      Provider Signature ___________________________________________________      These reports are needed for  compliance.    Please fax this completed form and a copy of the Diabetic Eye Exam report to the [facility] Department as soon as possible via Fax [phone], yazmin Peñaloza: Phone [phone]. Our office is located at [address] [place], [place] [address]ores [facility].     We thank you for your assistance in treating our mutual patient.

## 2025-03-26 NOTE — PROGRESS NOTES
Name: Chetna Sandhu      : 1955      MRN: [id number]  Encounter Provider: Magdalena Mckeon PA-C  Encounter Date: 3/26/2025   Encounter department: [facility] [place]  :  Assessment & Plan  Acute bilateral low back pain with bilateral sciatica  Pt is exquisitely tender to palpation over the low back, in particular over the mid spine.  She does have scoliosis, but with increase pain with unknown SAMIA, severe pain that goes down both legs will obtain x-rays, refer to [facility]SP and orthopedics right away.  Pt does not have red flag signs.  Orders:  •  Ambulatory Referral to Comprehensive Spine Program; Future  •  XR spine lumbar complete w bending minimum 6 views; Future  •  methocarbamol (ROBAXIN) 500 mg tablet; Take 1 tablet (500 mg total) by mouth 2 (two) times a day as needed for muscle spasms  •  Ambulatory referral to Spine & Pain Management; Future    Localized edema  Pt does not have erythema or warmth of the RLE.  However, with the edema seen throughout her legs and history of smoking, will r/o DVT with duplex.  Pt is scheduled tomorrow morning.  Reviewed ED precautions.  Orders:  •  VAS VENOUS DUPLEX -LOWER LIMB UNILATERAL; Future    Current mild episode of major depressive disorder, unspecified whether recurrent (HCC)  Pt reports great improvement in her mood and sleep with the Zoloft and trazodone.  Continue at current doses.                History of Present Illness   HPI    Pt is here for bilateral leg pain.    She has low back pain that is radiating down into her left posterior leg.  Started a month ago and woke up with it.  The left leg has sharp, shocking pains going down it and the right leg has a tingling, crawling sensation.  She also has right leg edema that is new over the past few weeks without erythema or warmth.  Patient denies red flag symptoms for cauda equina such as past malignancy, IV drug use, osteoporosis, chronic steroid use, immunosuppresion, recent spinal  "procedures, new extremity weakness or paresthesia, bowel or bladder incontinence or bladder retension, fever, chills, night sweats, recent trauma, acute persistent vertigo at rest, truncal ataxia, or saddle paresthesia.    Review of Systems   Constitutional: Negative.    Respiratory:  Negative for cough, shortness of breath and wheezing.    Cardiovascular:  Positive for leg swelling (right lower extremity). Negative for chest pain.   Gastrointestinal: Negative.    Musculoskeletal:  Positive for arthralgias, back pain and gait problem. Negative for joint swelling and neck pain.   Neurological:  Negative for syncope, light-headedness and headaches.   Psychiatric/Behavioral:  Negative for dysphoric mood and sleep disturbance.    All other systems reviewed and are negative.      Objective   /66 (BP Location: Left arm, Patient Position: Sitting)   Pulse 76   Temp (!) 95.9 °F (35.5 °C) (Tympanic)   Resp 14   Ht 5' 1\" (1.549 m)   Wt 69.8 kg (153 lb 12.8 oz)   SpO2 98%   BMI 29.06 kg/m²      Physical Exam  Vitals and nursing note reviewed.   Constitutional:       General: She is not in acute distress.     Appearance: Normal appearance.   Cardiovascular:      Rate and Rhythm: Normal rate and regular rhythm.      Heart sounds: Normal heart sounds.   Pulmonary:      Effort: Pulmonary effort is normal. No respiratory distress.      Breath sounds: Normal breath sounds.   Musculoskeletal:         General: No deformity or signs of injury.      Thoracic back: No swelling, tenderness or bony tenderness. Decreased range of motion. Scoliosis present.      Lumbar back: Tenderness and bony tenderness present. No swelling or spasms. Decreased range of motion. Positive right straight leg raise test and positive left straight leg raise test. Scoliosis present.      Right hip: Normal.      Left hip: Normal.        Legs:       Comments: Right thigh tingling present, worse with SLR  L posterior leg numbness and pain, sharp, worse " with SLR.   Skin:     General: Skin is warm and dry.   Neurological:      Mental Status: She is alert and oriented to person, place, and time.

## 2025-03-26 NOTE — TELEPHONE ENCOUNTER
----- Message from Christal VALVERDE sent at 3/26/2025 11:14 AM EDT -----  03/26/25 11:14 AM    Hello, our patient Chetna Sandhu has had Diabetic Eye Exam completed/performed. Please assist in updating the patient chart by making an External outreach to [facility] The date of service is approx December, 2024.    Thank you,  Christal Bustamante LPN   PRIMARY CARE [place]

## 2025-03-26 NOTE — LETTER
Diabetic Eye Exam Form    Date Requested: 25  Patient: Chetna Sandhu  Patient : 1955   Referring Provider: Magdalena Mckeon PA-C      DIABETIC Eye Exam Date _______________________________      Type of Exam MUST be documented for Diabetic Eye Exams. Please CHECK ONE.     Retinal Exam       Dilated Retinal Exam       OCT       Optomap-Iris Exam      Fundus Photography       Left Eye - Please check Retinopathy or No Retinopathy        Exam did show retinopathy    Exam did not show retinopathy       Right Eye - Please check Retinopathy or No Retinopathy       Exam did show retinopathy    Exam did not show retinopathy       Comments __________________________________________________________    Practice Providing Exam ______________________________________________    Exam Performed By (print name) _______________________________________      Provider Signature ___________________________________________________      These reports are needed for  compliance.    Please fax this completed form and a copy of the Diabetic Eye Exam report to the [facility] Department as soon as possible via Fax [phone], yazmin Peñaloza: Phone [phone]. Our office is located at [address] [place], [place] [address] [facility].     We thank you for your assistance in treating our mutual patient.

## 2025-03-26 NOTE — ASSESSMENT & PLAN NOTE
Pt reports great improvement in her mood and sleep with the Zoloft and trazodone.  Continue at current doses.

## 2025-03-26 NOTE — LETTER
Diabetic Eye Exam Form    Date Requested: 25  Patient: Chetna Sandhu  Patient : 1955   Referring Provider: Magdalena Mckeon PA-C      DIABETIC Eye Exam Date _______________________________      Type of Exam MUST be documented for Diabetic Eye Exams. Please CHECK ONE.     Retinal Exam       Dilated Retinal Exam       OCT       Optomap-Iris Exam      Fundus Photography       Left Eye - Please check Retinopathy or No Retinopathy        Exam did show retinopathy    Exam did not show retinopathy       Right Eye - Please check Retinopathy or No Retinopathy       Exam did show retinopathy    Exam did not show retinopathy       Comments __________________________________________________________    Practice Providing Exam ______________________________________________    Exam Performed By (print name) _______________________________________      Provider Signature ___________________________________________________      These reports are needed for  compliance.    Please fax this completed form and a copy of the Diabetic Eye Exam report to the [facility] Department as soon as possible via Fax [phone], yazmin Peñaloza: Phone [phone]. Our office is located at [address] [place], [place] [address] [facility].     We thank you for your assistance in treating our mutual patient.

## 2025-03-27 ENCOUNTER — HOSPITAL ENCOUNTER (OUTPATIENT)
Dept: NON INVASIVE DIAGNOSTICS | Facility: CLINIC | Age: 70
Discharge: HOME | End: 2025-03-27
Payer: COMMERCIAL

## 2025-03-27 ENCOUNTER — TELEPHONE (OUTPATIENT)
Dept: PHYSICAL THERAPY | Facility: OTHER | Age: 70
End: 2025-03-27

## 2025-03-27 ENCOUNTER — RESULTS FOLLOW-UP (OUTPATIENT)
Age: 70
End: 2025-03-27

## 2025-03-27 DIAGNOSIS — R60.0 LOCALIZED EDEMA: ICD-10-CM

## 2025-03-27 DIAGNOSIS — N20.0 RENAL STONE: Primary | ICD-10-CM

## 2025-03-27 PROCEDURE — 93971 EXTREMITY STUDY: CPT

## 2025-03-27 PROCEDURE — 93971 EXTREMITY STUDY: CPT | Performed by: SURGERY

## 2025-03-27 NOTE — TELEPHONE ENCOUNTER
Patient called back asking if the provider can recommend a Urologist. She asked to please notify her with the information

## 2025-03-27 NOTE — TELEPHONE ENCOUNTER
"Call placed to the patient per Comprehensive Spine Program referral.    Pateint did not answer the call. Unable to leave a message \"voice mailbox has not been set up yet\".    This is the 1st attempt to reach the patient. Will defer referral per protocol.    Hx of scoliosis.  B/L Lower back pain with b/l sciatica.  Seen by FM yesterday 03/26 who ordered Xray spine lumbar and referred to CSP and PM.  "

## 2025-03-27 NOTE — TELEPHONE ENCOUNTER
----- Message from Magdalena Mckeon PA-C sent at 3/27/2025  4:59 PM EDT -----  I don't have a specific recommendation because I don't know the urologist up in this area.  Please provide her with the local office's number.  ----- Message -----  From: Christal Bustamante LPN  Sent: 3/27/2025   2:26 PM EDT  To: Magdalena Mckeon PA-C

## 2025-03-28 ENCOUNTER — TELEPHONE (OUTPATIENT)
Age: 70
End: 2025-03-28

## 2025-03-28 NOTE — TELEPHONE ENCOUNTER
Npt called stating [facility] 9am - 12:30 I rescheduled to first available 4/17/25 9:40 , she is scheduled 04/03/25 for U/S if needs to be seen sooner will need to be within transportation times.

## 2025-03-28 NOTE — TELEPHONE ENCOUNTER
New Patient    Appointment Scheduling  What office location does the patient prefer? [place]   What is the reason for the patient's appointment? NP- kidney stone   Have patient records been requested? No  If No, are the records showing in Epic: Yes    Appointment Details  Date: 4/9/25  Time: 8 am  Location: [place]  Provider: GREGORIO Levy  Does the appointment need further review? (Reason) No      HISTORY  Is the patient having active symptoms? If so, describe symptoms:  Has the patient had any previous Urologist(s)? No  Was the patient seen in the ED? No  Has the patient had any outside testing done? No  Does patient have Imaging/Lab Results: Yes  Have you had Cancer? No    INSURANCE   Have you confirmed Patient's insurance? Yes  Is the insurance accepted? Yes  Is the insurance active? Yes

## 2025-03-31 ENCOUNTER — HOSPITAL ENCOUNTER (EMERGENCY)
Facility: HOSPITAL | Age: 70
Discharge: HOME | End: 2025-03-31
Attending: EMERGENCY MEDICINE
Payer: COMMERCIAL

## 2025-03-31 ENCOUNTER — APPOINTMENT (EMERGENCY)
Dept: CT IMAGING | Facility: HOSPITAL | Age: 70
End: 2025-03-31
Payer: COMMERCIAL

## 2025-03-31 VITALS
RESPIRATION RATE: 19 BRPM | OXYGEN SATURATION: 100 % | SYSTOLIC BLOOD PRESSURE: 166 MMHG | TEMPERATURE: 97.9 F | HEART RATE: 65 BPM | DIASTOLIC BLOOD PRESSURE: 71 MMHG

## 2025-03-31 DIAGNOSIS — M54.50 LUMBOSACRAL PAIN: Primary | ICD-10-CM

## 2025-03-31 LAB
ANION GAP SERPL CALCULATED.3IONS-SCNC: 8 MMOL/L (ref 4–13)
BACTERIA UR QL AUTO: NORMAL /HPF
BASOPHILS # BLD AUTO: 0.06 THOUSANDS/ÂΜL (ref 0–0.1)
BASOPHILS NFR BLD AUTO: 1 % (ref 0–1)
BILIRUB UR QL STRIP: NEGATIVE
BUN SERPL-MCNC: 15 MG/DL (ref 5–25)
CALCIUM SERPL-MCNC: 8.9 MG/DL (ref 8.4–10.2)
CHLORIDE SERPL-SCNC: 107 MMOL/L (ref 96–108)
CLARITY UR: CLEAR
CO2 SERPL-SCNC: 24 MMOL/L (ref 21–32)
COLOR UR: COLORLESS
CREAT SERPL-MCNC: 0.81 MG/DL (ref 0.6–1.3)
EOSINOPHIL # BLD AUTO: 0.23 THOUSAND/ÂΜL (ref 0–0.61)
EOSINOPHIL NFR BLD AUTO: 2 % (ref 0–6)
ERYTHROCYTE [DISTWIDTH] IN BLOOD BY AUTOMATED COUNT: 12.5 % (ref 11.6–15.1)
GFR SERPL CREATININE-BSD FRML MDRD: 74 ML/MIN/1.73SQ M
GLUCOSE SERPL-MCNC: 93 MG/DL (ref 65–140)
GLUCOSE UR STRIP-MCNC: NEGATIVE MG/DL
HCT VFR BLD AUTO: 36.5 % (ref 34.8–46.1)
HGB BLD-MCNC: 12.4 G/DL (ref 11.5–15.4)
HGB UR QL STRIP.AUTO: NEGATIVE
IMM GRANULOCYTES # BLD AUTO: 0.08 THOUSAND/UL (ref 0–0.2)
IMM GRANULOCYTES NFR BLD AUTO: 1 % (ref 0–2)
KETONES UR STRIP-MCNC: NEGATIVE MG/DL
LEUKOCYTE ESTERASE UR QL STRIP: ABNORMAL
LYMPHOCYTES # BLD AUTO: 2.87 THOUSANDS/ÂΜL (ref 0.6–4.47)
LYMPHOCYTES NFR BLD AUTO: 26 % (ref 14–44)
MCH RBC QN AUTO: 31.5 PG (ref 26.8–34.3)
MCHC RBC AUTO-ENTMCNC: 34 G/DL (ref 31.4–37.4)
MCV RBC AUTO: 93 FL (ref 82–98)
MONOCYTES # BLD AUTO: 0.68 THOUSAND/ÂΜL (ref 0.17–1.22)
MONOCYTES NFR BLD AUTO: 6 % (ref 4–12)
NEUTROPHILS # BLD AUTO: 6.98 THOUSANDS/ÂΜL (ref 1.85–7.62)
NEUTS SEG NFR BLD AUTO: 64 % (ref 43–75)
NITRITE UR QL STRIP: NEGATIVE
NON-SQ EPI CELLS URNS QL MICRO: NORMAL /HPF
NRBC BLD AUTO-RTO: 0 /100 WBCS
PH UR STRIP.AUTO: 5 [PH]
PLATELET # BLD AUTO: 243 THOUSANDS/UL (ref 149–390)
PMV BLD AUTO: 9.9 FL (ref 8.9–12.7)
POTASSIUM SERPL-SCNC: 4 MMOL/L (ref 3.5–5.3)
PROT UR STRIP-MCNC: NEGATIVE MG/DL
RBC # BLD AUTO: 3.94 MILLION/UL (ref 3.81–5.12)
RBC #/AREA URNS AUTO: NORMAL /HPF
SODIUM SERPL-SCNC: 139 MMOL/L (ref 135–147)
SP GR UR STRIP.AUTO: 1.01 (ref 1–1.03)
UROBILINOGEN UR STRIP-ACNC: <2 MG/DL
WBC # BLD AUTO: 10.9 THOUSAND/UL (ref 4.31–10.16)
WBC #/AREA URNS AUTO: NORMAL /HPF

## 2025-03-31 PROCEDURE — 85025 COMPLETE CBC W/AUTO DIFF WBC: CPT

## 2025-03-31 PROCEDURE — 99284 EMERGENCY DEPT VISIT MOD MDM: CPT

## 2025-03-31 PROCEDURE — 81001 URINALYSIS AUTO W/SCOPE: CPT

## 2025-03-31 PROCEDURE — 80048 BASIC METABOLIC PNL TOTAL CA: CPT

## 2025-03-31 PROCEDURE — 74176 CT ABD & PELVIS W/O CONTRAST: CPT

## 2025-03-31 PROCEDURE — 36415 COLL VENOUS BLD VENIPUNCTURE: CPT

## 2025-03-31 PROCEDURE — 96374 THER/PROPH/DIAG INJ IV PUSH: CPT

## 2025-03-31 RX ORDER — LIDOCAINE 50 MG/G
1 PATCH TOPICAL DAILY
Qty: 14 PATCH | Refills: 0 | Status: SHIPPED | OUTPATIENT
Start: 2025-03-31

## 2025-03-31 RX ORDER — LIDOCAINE 50 MG/G
1 PATCH TOPICAL ONCE
Status: DISCONTINUED | OUTPATIENT
Start: 2025-03-31 | End: 2025-03-31 | Stop reason: HOSPADM

## 2025-03-31 RX ORDER — KETOROLAC TROMETHAMINE 30 MG/ML
15 INJECTION, SOLUTION INTRAMUSCULAR; INTRAVENOUS ONCE
Status: COMPLETED | OUTPATIENT
Start: 2025-03-31 | End: 2025-03-31

## 2025-03-31 RX ADMIN — LIDOCAINE 5% 1 PATCH: 700 PATCH TOPICAL at 13:11

## 2025-03-31 RX ADMIN — KETOROLAC TROMETHAMINE 15 MG: 30 INJECTION, SOLUTION INTRAMUSCULAR; INTRAVENOUS at 13:29

## 2025-03-31 NOTE — TELEPHONE ENCOUNTER
No need for Ultrasound, patient when to the ED and had a CT scan done showing approximately 2 cm right renal pelvis stone. Can follow up outpatient to discuss ureteroscopy.

## 2025-03-31 NOTE — ED PROVIDER NOTES
Time reflects when diagnosis was documented in both MDM as applicable and the Disposition within this note       Time User Action Codes Description Comment    3/31/2025  2:07 PM Michael Quintanilla Add [M54.50,  G89.29] Lumbosacral pain, chronic     3/31/2025  2:07 PM Michael Quintanilla Remove [M54.50,  G89.29] Lumbosacral pain, chronic     3/31/2025  2:07 PM Michael Quintanilla Add [M54.50] Lumbosacral pain           ED Disposition       ED Disposition   Discharge    Condition   Stable    Date/Time   Mon Mar 31, 2025  1:15 PM    Comment   Chetna Sandhu discharge to home/self care.                   Assessment & Plan       Medical Decision Making   69 y.o. female with a history  presents to ED with complaint right lumbosacral pain. Patient reports worsened pain this morning and required pain control. On evaluation patient is well appearing, with stable vital signs, Exam significant for tenderness of the right lumbosacral region with radicular pain in the L3/L4 nerve root distribution.      Differential Diagnosis: includes but is not limited to radicular pain, traumatic lumbosacral pain, and osteoarthritis    Evaluation and Management: (see ED course for additioinal details)  - Labs: cbc, bmp, and UA  - Imaging: CT abd and pelvis  - Interventions: lidoderm patches and toradol     Clinical impression is most consistent with radicular pain secondary to degenerative changes    Plan and Disposition:   - Pain control,  methocarbamol use, lidoderm patch, and follow up with urology and comprehensive spine  - Reviewed diagnosis, treatment plan, and expectant course  - Verbal and written instructions given for outpatient follow up   - Discussed reasons to Return to ED.      Amount and/or Complexity of Data Reviewed  Labs: ordered. Decision-making details documented in ED Course.  Radiology: ordered. Decision-making details documented in ED Course.    Risk  Prescription drug management.        ED Course as of 03/31/25 1425   Mon  Mar 31, 2025   1348 CT abdomen pelvis wo contrast  Severe lumbar stenosis of L5/S1 and L4/L5. Chronic lamellated right renal stone appreciated.    1358 UA w Reflex to Microscopic w Reflex to Culture(!)  Trace leuks, negative for nitrites   1403 discussed patient with urology who recommends outpatient follow-up for renal ultrasound with high-powered laser       Medications   lidocaine (LIDODERM) 5 % patch 1 patch (1 patch Topical Medication Applied 3/31/25 1311)   ketorolac (TORADOL) injection 15 mg (15 mg Intravenous Given 3/31/25 1329)       ED Risk Strat Scores                            SBIRT 22yo+      Flowsheet Row Most Recent Value   Initial Alcohol Screen: US AUDIT-C     1. How often do you have a drink containing alcohol? 0 Filed at: 03/31/2025 1127   2. How many drinks containing alcohol do you have on a typical day you are drinking?  0 Filed at: 03/31/2025 1127   3b. FEMALE Any Age, or MALE 65+: How often do you have 4 or more drinks on one occassion? 0 Filed at: 03/31/2025 1127   Audit-C Score 0 Filed at: 03/31/2025 1127   RAEGAN: How many times in the past year have you...    Used an illegal drug or used a prescription medication for non-medical reasons? Never Filed at: 03/31/2025 1127                            History of Present Illness       Chief Complaint   Patient presents with    Flank Pain     Biba Pt C/O right sided flank pain Hx of kidney stones. Has appointment for ultrasound on Thursday. Pain became too much an she called ambulance       Past Medical History:   Diagnosis Date    Anemia 1996    Cancer (HCC)     cervical    Chronic kidney disease 2022    Colon polyp     Depression     Diabetes mellitus (HCC) 1996    Hyperlipidemia     Hypertension     Inclusion cyst of vulva 03/22/2022    Irritable bowel syndrome 2022    Nodule of lower lobe of right lung       Past Surgical History:   Procedure Laterality Date    CHOLECYSTECTOMY      COLONOSCOPY      HEMICOLOECTOMY W/ ANASTOMOSIS N/A 12/14/2020     Procedure: LAPAROSCOPIC HAND-ASSISTED RIGHT HEMICOLECTOMY;  Surgeon: Ady Meek MD;  Location: [facility];  Service: Surgical Oncology    HYSTERECTOMY      partial      Family History   Problem Relation Age of Onset    Throat cancer Mother     Heart disease Father       Social History     Tobacco Use    Smoking status: Every Day     Average packs/day: 1 pack/day for 52.0 years (52.0 ttl pk-yrs)     Types: Cigarettes     Start date: 1/29/1967    Smokeless tobacco: Never    Tobacco comments:     Few times per week   Vaping Use    Vaping status: Never Used   Substance Use Topics    Alcohol use: Not Currently    Drug use: Never      E-Cigarette/Vaping    E-Cigarette Use Never User       E-Cigarette/Vaping Substances    Nicotine No     THC No     CBD No     Flavoring No     Other No     Unknown No       I have reviewed and agree with the history as documented.     Patient is a 69-year-old female with a past medical history of type 2 diabetes mellitus, hypertension, hyperlipidemia, cervical cancer presenting by EMS after an episode of severe right lower back pain that radiated into the right knee.  She denies any fevers, chills, urinary incontinence, stool incontinence, abdominal pain, chest pain.  She does report that her primary care provider ordered a lumbar x-ray for evaluation of her pain which revealed a sizable suspected kidney stone and is awaiting a renal ultrasound.  Her primary care provider also recommended referral to comprehensive spine program and patient reports that she was waiting for the renal ultrasound until she saw them.      Flank Pain  Associated symptoms: no chills, no cough, no diarrhea, no fatigue, no fever, no nausea, no shortness of breath and no vomiting        Review of Systems   Constitutional:  Negative for chills, fatigue and fever.   Respiratory:  Negative for cough, chest tightness and shortness of breath.    Cardiovascular:  Negative for palpitations.   Gastrointestinal:   Negative for abdominal pain, diarrhea, nausea and vomiting.   Genitourinary:  Positive for flank pain.   Musculoskeletal:  Negative for myalgias and neck pain.   Neurological:  Negative for weakness, numbness and headaches.   All other systems reviewed and are negative.          Objective       ED Triage Vitals   Temperature Pulse Blood Pressure Respirations SpO2 Patient Position - Orthostatic VS   03/31/25 1126 03/31/25 1126 03/31/25 1126 03/31/25 1126 03/31/25 1126 --   97.9 °F (36.6 °C) 74 (!) 202/79 18 99 %       Temp src Heart Rate Source BP Location FiO2 (%) Pain Score    -- 03/31/25 1200 03/31/25 1126 -- 03/31/25 1329     Monitor Right arm  10 - Worst Possible Pain      Vitals      Date and Time Temp Pulse SpO2 Resp BP Pain Score FACES Pain Rating User   03/31/25 1329 -- -- -- -- -- 10 - Worst Possible Pain -- DO   03/31/25 1315 -- 65 100 % 19 166/71 -- -- SB   03/31/25 1200 -- 68 97 % 19 160/68 -- -- SB   03/31/25 1130 -- 74 99 % 18 152/99 -- -- DO   03/31/25 1126 97.9 °F (36.6 °C) 74 99 % 18 202/79 -- -- DO            Physical Exam  Vitals and nursing note reviewed.   Constitutional:       General: She is not in acute distress.     Appearance: She is not ill-appearing.   HENT:      Head: Normocephalic and atraumatic.   Eyes:      Pupils: Pupils are equal, round, and reactive to light.   Cardiovascular:      Rate and Rhythm: Normal rate and regular rhythm.   Pulmonary:      Effort: Pulmonary effort is normal.   Abdominal:      General: Abdomen is flat.   Musculoskeletal:         General: Tenderness present. No swelling.   Skin:     General: Skin is warm and dry.   Neurological:      General: No focal deficit present.      Mental Status: Mental status is at baseline.   Psychiatric:         Mood and Affect: Mood normal.         Behavior: Behavior normal.         Results Reviewed       Procedure Component Value Units Date/Time    Urine Microscopic [[id number]]  (Normal) Collected: 03/31/25 1328    Lab Status:  Final result Specimen: Urine, Clean Catch Updated: 03/31/25 1355     RBC, UA 1-2 /hpf      WBC, UA 1-2 /hpf      Epithelial Cells Occasional /hpf      Bacteria, UA None Seen /hpf     UA w Reflex to Microscopic w Reflex to Culture [[id number]]  (Abnormal) Collected: 03/31/25 1328    Lab Status: Final result Specimen: Urine, Clean Catch Updated: 03/31/25 1354     Color, UA Colorless     Clarity, UA Clear     Specific Gravity, UA 1.010     pH, UA 5.0     Leukocytes, UA Trace     Nitrite, UA Negative     Protein, UA Negative mg/dl      Glucose, UA Negative mg/dl      Ketones, UA Negative mg/dl      Urobilinogen, UA <2.0 mg/dl      Bilirubin, UA Negative     Occult Blood, UA Negative    Basic metabolic panel [[id number]] Collected: 03/31/25 1316    Lab Status: Final result Specimen: Blood from Arm, Left Updated: 03/31/25 1341     Sodium 139 mmol/L      Potassium 4.0 mmol/L      Chloride 107 mmol/L      CO2 24 mmol/L      ANION GAP 8 mmol/L      BUN 15 mg/dL      Creatinine 0.81 mg/dL      Glucose 93 mg/dL      Calcium 8.9 mg/dL      eGFR 74 ml/min/1.73sq m     Narrative:      National Kidney Disease Foundation guidelines for Chronic Kidney Disease (CKD):     Stage 1 with normal or high GFR (GFR > 90 mL/min/1.73 square meters)    Stage 2 Mild CKD (GFR = 60-89 mL/min/1.73 square meters)    Stage 3A Moderate CKD (GFR = 45-59 mL/min/1.73 square meters)    Stage 3B Moderate CKD (GFR = 30-44 mL/min/1.73 square meters)    Stage 4 Severe CKD (GFR = 15-29 mL/min/1.73 square meters)    Stage 5 End Stage CKD (GFR <15 mL/min/1.73 square meters)  Note: GFR calculation is accurate only with a steady state creatinine    CBC and differential [[id number]]  (Abnormal) Collected: 03/31/25 1316    Lab Status: Final result Specimen: Blood from Arm, Left Updated: 03/31/25 1326     WBC 10.90 Thousand/uL      RBC 3.94 Million/uL      Hemoglobin 12.4 g/dL      Hematocrit 36.5 %      MCV 93 fL      MCH 31.5 pg      MCHC 34.0 g/dL      RDW 12.5 %       MPV 9.9 fL      Platelets 243 Thousands/uL      nRBC 0 /100 WBCs      Segmented % 64 %      Immature Grans % 1 %      Lymphocytes % 26 %      Monocytes % 6 %      Eosinophils Relative 2 %      Basophils Relative 1 %      Absolute Neutrophils 6.98 Thousands/µL      Absolute Immature Grans 0.08 Thousand/uL      Absolute Lymphocytes 2.87 Thousands/µL      Absolute Monocytes 0.68 Thousand/µL      Eosinophils Absolute 0.23 Thousand/µL      Basophils Absolute 0.06 Thousands/µL             CT abdomen pelvis wo contrast   Final Interpretation by Donovan Mendez MD (03/31 1338)   1.  Chronic lamellated stone in the right renal pelvis appears similar in size to 2020, though there is slight increase in associated pelvocaliectasis. Correlate for urinalysis evidence of active infection.      2.  Otherwise, no acute abdominopelvic findings.      Workstation performed: [id number]             Procedures    ED Medication and Procedure Management   Prior to Admission Medications   Prescriptions Last Dose Informant Patient Reported? Taking?   Blood Glucose Monitoring Suppl (ONE TOUCH ULTRA 2) w/Device KIT   No No   Sig: Use in the morning Test blood sugar daily   Blood Glucose Monitoring Suppl (OneTouch Verio Reflect) w/Device KIT   No No   Sig: Use 1 each in the morning   OneTouch Delica Lancets 33G MISC   No No   Sig: Check blood glucose daily   Potassium Chloride ER 20 MEQ TBCR   No No   Sig: Take 1 tablet (20 mEq total) by mouth daily   Turmeric (QC Tumeric Complex) 500 MG CAPS   No No   Sig: Take 1 capsule (500 mg total) by mouth in the morning   atorvastatin (LIPITOR) 20 mg tablet   No No   Sig: Take 1 tablet (20 mg total) by mouth daily   clindamycin (CLEOCIN T) 1 % external solution   No No   Sig: Apply topically 2 (two) times a day   coal tar-salicylic acid 1 % shampoo   No No   Sig: Apply topically daily at bedtime as needed for dandruff   doxycycline hyclate (VIBRA-TABS) 100 mg tablet   Yes No   Sig: Take 1 tablet by  mouth 2 (two) times a day   fenofibrate 160 MG tablet   No No   Sig: Take 1 tablet (160 mg total) by mouth daily   furosemide (LASIX) 20 mg tablet   No No   Sig: Take 1 tablet (20 mg total) by mouth daily   glucose blood (OneTouch Ultra) test strip   No No   Sig: Check blood sugars once daily. Please substitute with appropriate alternative as covered by patient's insurance. Dx: E11.65   ketoconazole (NIZORAL) 2 % cream   No No   Sig: Apply topically daily   magnesium (MAGTAB) 84 MG (7MEQ) TBCR   No No   Sig: Take 1 tablet (84 mg total) by mouth daily   methocarbamol (ROBAXIN) 500 mg tablet   No No   Sig: Take 1 tablet (500 mg total) by mouth 2 (two) times a day as needed for muscle spasms   metoprolol succinate (TOPROL-XL) 25 mg 24 hr tablet   No No   Sig: Take 1 tablet (25 mg total) by mouth daily   nystatin (MYCOSTATIN) powder  Self No No   Sig: Apply topically 2 (two) times a day as needed (itching)   sertraline (ZOLOFT) 50 mg tablet   No No   Sig: Take 1 tablet (50 mg total) by mouth daily   sitaGLIPtin (JANUVIA) 100 mg tablet   No No   Sig: Take 1 tablet (100 mg total) by mouth daily   traZODone (DESYREL) 50 mg tablet   No No   Sig: Take 1 tablet (50 mg total) by mouth daily at bedtime   valsartan (DIOVAN) 320 MG tablet   No No   Sig: Take 1 tablet (320 mg total) by mouth daily      Facility-Administered Medications: None     Patient's Medications   Discharge Prescriptions    LIDOCAINE (LIDODERM) 5 %    Apply 1 patch topically over 12 hours daily Remove & Discard patch within 12 hours or as directed by MD       Start Date: 3/31/2025 End Date: --       Order Dose: 1 patch       Quantity: 14 patch    Refills: 0     No discharge procedures on file.  ED SEPSIS DOCUMENTATION   Time reflects when diagnosis was documented in both MDM as applicable and the Disposition within this note       Time User Action Codes Description Comment    3/31/2025  2:07 PM Michael Quintanilla Add [M54.50,  G89.29] Lumbosacral pain, chronic      3/31/2025  2:07 PM Michael Quintanilla Remove [M54.50,  G89.29] Lumbosacral pain, chronic     3/31/2025  2:07 PM Michael Quintanilla Add [M54.50] Lumbosacral pain                  Michael Quintanilla PA-C  03/31/25 1049

## 2025-03-31 NOTE — TELEPHONE ENCOUNTER
This nurse reached out to patient to make her aware that she does not need to have US done ptv as she had a CT scan done while at the ER per Cari SPENCE. Patient is instructed to just come to her follow up appointment with Marisa SPENCE as scheduled on 4/17/25. Patient verbalizes complete understanding and states she will cancel her upcoming US.

## 2025-03-31 NOTE — DISCHARGE INSTRUCTIONS
Follow with primary care provider for review and continuation of care. For worsening symptoms or the development of severe headache, chest pain, SOB, abdominal pain, nausea, vomiting, diarrhea, or weakness, call 911 or return to the emergency department for further evaluation.     Manage pain with regular use of tylenol and ibuprofen, and lidocaine pathches. Follow up with comprehensive spine for further evaluation. Follow up with urology for further evaluation of kidney stone.

## 2025-04-01 ENCOUNTER — VBI (OUTPATIENT)
Age: 70
End: 2025-04-01

## 2025-04-01 NOTE — TELEPHONE ENCOUNTER
04/01/25 9:04 AM    Patient contacted post ED visit, outreach attempt made but message could not be left. Additional outreach attempt will be made.     Thank you.  Alla Sheikh  [facility]

## 2025-04-01 NOTE — TELEPHONE ENCOUNTER
04/01/25 9:10 AM    Patient contacted post ED visit, [facility] department spoke with patient/caregiver and outreach was successful.    Thank you.  Alla Sheikh  [facility]

## 2025-04-02 NOTE — TELEPHONE ENCOUNTER
As a follow-up, a second attempt has been made for outreach via fax to facility. Please see Contacts section for details.    Thank you  Anabela Jarrett MA

## 2025-04-08 ENCOUNTER — TELEPHONE (OUTPATIENT)
Age: 70
End: 2025-04-08

## 2025-04-08 DIAGNOSIS — M54.42 ACUTE BILATERAL LOW BACK PAIN WITH BILATERAL SCIATICA: Primary | ICD-10-CM

## 2025-04-08 DIAGNOSIS — M54.41 ACUTE BILATERAL LOW BACK PAIN WITH BILATERAL SCIATICA: Primary | ICD-10-CM

## 2025-04-08 RX ORDER — OXYCODONE HYDROCHLORIDE 5 MG/1
5 TABLET ORAL EVERY 6 HOURS PRN
Qty: 12 TABLET | Refills: 0 | Status: SHIPPED | OUTPATIENT
Start: 2025-04-08 | End: 2025-04-11

## 2025-04-08 NOTE — PROGRESS NOTES
[place] or [place]  reviewed: No red flags were identified; safe to proceed with prescription    Pt has large renal stone with slight pelvocaliectasis on recent CT scan.  This stone has been present since 2020.  She also has thoracolumbar spondylosis with severe foraminal stenosis at L4-5 and L5-S1, similar to 2020.    Will treat with brief course of opiates.  Pt was given referral to physical therapy, spine and pain management as well as urology to follow up on the above issues.

## 2025-04-08 NOTE — TELEPHONE ENCOUNTER
Pt called in wishing to speak with [facility] in regards to some pain medication. Pt reports she is experiencing kidney stone pain.     Attempted to reach out to Clinical team for [facility], was informed she is busy at the moment.     Pt is requesting if it is possible April could please send in some pain medication for her kidney stone pain? States Tylenol & Motrin are not helping.     Pt reports she is not able to make it in for any appt due to caring for her sister.     Please advise & call pt back with update. Thank you!    Chetna: [phone]

## 2025-04-09 NOTE — TELEPHONE ENCOUNTER
Upon review of the In Basket request we were able to locate, review, and update the patient chart as requested for Diabetic Eye Exam.    Any additional questions or concerns should be emailed to the Practice Liaisons via the appropriate education email address, please do not reply via In Basket.    Thank you  Anabela Jarrett MA   [facility]

## 2025-04-09 NOTE — TELEPHONE ENCOUNTER
As a final attempt, a third outreach has been made via fax to facility and telephone call to facility. Please see Contacts section for details. This encounter will be closed and completed by end of day. Should we receive the requested information because of previous outreach attempts, the requested patient's chart will be updated appropriately.     Thank you  Anabela Jarrett MA

## 2025-04-09 NOTE — TELEPHONE ENCOUNTER
Patient called - I read doctor's note, verbatim.  Patient understood and had no further questions.    She said that she does have an appointment with Urology but not until 4/17/25.

## 2025-04-24 DIAGNOSIS — R73.03 PREDIABETES: ICD-10-CM

## 2025-04-24 RX ORDER — BLOOD SUGAR DIAGNOSTIC
STRIP MISCELLANEOUS
Qty: 100 EACH | Refills: 1 | Status: SHIPPED | OUTPATIENT
Start: 2025-04-24

## 2025-04-24 NOTE — TELEPHONE ENCOUNTER
Patient called     Requested Prescriptions     Pending Prescriptions Disp Refills    glucose blood (OneTouch Ultra) test strip 100 each 3     Sig: Check blood sugars once daily. Please substitute with appropriate alternative as covered by patient's insurance. Dx: E11.65     [id number] - [address] [place] [address] [phone]

## 2025-05-05 DIAGNOSIS — M54.41 ACUTE BILATERAL LOW BACK PAIN WITH BILATERAL SCIATICA: ICD-10-CM

## 2025-05-05 DIAGNOSIS — M54.42 ACUTE BILATERAL LOW BACK PAIN WITH BILATERAL SCIATICA: ICD-10-CM

## 2025-05-05 NOTE — TELEPHONE ENCOUNTER
Patient requests refill of Gabaentin 100mg, not on med list.       Medication: methocarbamol (ROBAXIN) 500 mg tablet     Dose/Frequency: Take 1 tablet (500 mg total) by mouth 2 (two) times a day as needed for muscle spasms     Quantity: 30 tablet     Pharm[facility]     Office:   [x] PCP/Provider -   [] Speciality/Provider -     Does the patient have enough for 3 days?   [x] Yes   [] No - Send as HP to POD

## 2025-05-06 DIAGNOSIS — M54.41 ACUTE BILATERAL LOW BACK PAIN WITH BILATERAL SCIATICA: Primary | ICD-10-CM

## 2025-05-06 DIAGNOSIS — M54.42 ACUTE BILATERAL LOW BACK PAIN WITH BILATERAL SCIATICA: Primary | ICD-10-CM

## 2025-05-06 RX ORDER — METHOCARBAMOL 500 MG/1
500 TABLET, FILM COATED ORAL 2 TIMES DAILY PRN
Qty: 28 TABLET | Refills: 0 | Status: SHIPPED | OUTPATIENT
Start: 2025-05-06 | End: 2025-05-20

## 2025-05-06 RX ORDER — GABAPENTIN 100 MG/1
100 CAPSULE ORAL 2 TIMES DAILY
Qty: 28 CAPSULE | Refills: 0 | Status: SHIPPED | OUTPATIENT
Start: 2025-05-06 | End: 2025-05-20

## 2025-05-06 NOTE — TELEPHONE ENCOUNTER
Patient called again today,     Requesting medication refills    methocarbamol (ROBAXIN) 500 mg tablet () Take 1 tablet (500 mg total) by mouth 2 (two) times a day as needed for muscle spasms 30 tablet 0 ordered      Gabapentin 100 mg BID ( not currently on med list) patient was prescribed this medication when at [facility] on     [facility] [address] [phone]     Patient wants to know who is going to refill her medications and wants a call back from Samreen.    Please advise, Thank you.

## 2025-05-06 NOTE — TELEPHONE ENCOUNTER
14 day supply sent, ultimately not a longterm medication - follow up with PCP for further management

## 2025-05-07 ENCOUNTER — TELEPHONE (OUTPATIENT)
Age: 70
End: 2025-05-07

## 2025-05-07 NOTE — TELEPHONE ENCOUNTER
PA for Gabapentin 100MG SUBMITTED               via    [x][facility]-KEY: [id number]  [x]PA sent as URGENT    All office notes, labs and other pertaining documents and studies sent. Clinical questions answered. Awaiting determination from insurance company.     Turnaround time for your insurance to make a decision on your Prior Authorization can take 7-21 business days.

## 2025-05-08 NOTE — TELEPHONE ENCOUNTER
PA for Gabapentin 100MG  APPROVED     Date(s) approved             Patient advised by          [x]MyChart Message  []Phone call   []LMOM  []L/M to call office as no active Communication consent on file  []Unable to leave detailed message as VM not approved on Communication consent       Pharmacy advised by    [x]Fax  []Phone call  []Secure Chat         Approval letter scanned into Media Yes

## 2025-05-17 DIAGNOSIS — M54.42 ACUTE BILATERAL LOW BACK PAIN WITH BILATERAL SCIATICA: ICD-10-CM

## 2025-05-17 DIAGNOSIS — M54.41 ACUTE BILATERAL LOW BACK PAIN WITH BILATERAL SCIATICA: ICD-10-CM

## 2025-05-19 RX ORDER — GABAPENTIN 100 MG/1
100 CAPSULE ORAL 2 TIMES DAILY
Qty: 60 CAPSULE | Refills: 0 | Status: SHIPPED | OUTPATIENT
Start: 2025-05-19 | End: 2025-05-21 | Stop reason: SDUPTHER

## 2025-05-21 ENCOUNTER — OFFICE VISIT (OUTPATIENT)
Age: 70
End: 2025-05-21
Payer: COMMERCIAL

## 2025-05-21 ENCOUNTER — TELEPHONE (OUTPATIENT)
Age: 70
End: 2025-05-21

## 2025-05-21 ENCOUNTER — APPOINTMENT (OUTPATIENT)
Age: 70
End: 2025-05-21
Payer: COMMERCIAL

## 2025-05-21 VITALS
HEART RATE: 77 BPM | RESPIRATION RATE: 18 BRPM | OXYGEN SATURATION: 95 % | SYSTOLIC BLOOD PRESSURE: 140 MMHG | HEIGHT: 61 IN | WEIGHT: 155.8 LBS | BODY MASS INDEX: 29.42 KG/M2 | TEMPERATURE: 95.6 F | DIASTOLIC BLOOD PRESSURE: 72 MMHG

## 2025-05-21 DIAGNOSIS — D72.829 LEUKOCYTOSIS, UNSPECIFIED TYPE: ICD-10-CM

## 2025-05-21 DIAGNOSIS — F32.0 CURRENT MILD EPISODE OF MAJOR DEPRESSIVE DISORDER, UNSPECIFIED WHETHER RECURRENT (HCC): ICD-10-CM

## 2025-05-21 DIAGNOSIS — E78.2 MIXED HYPERLIPIDEMIA: ICD-10-CM

## 2025-05-21 DIAGNOSIS — I10 PRIMARY HYPERTENSION: ICD-10-CM

## 2025-05-21 DIAGNOSIS — N18.31 STAGE 3A CHRONIC KIDNEY DISEASE (HCC): ICD-10-CM

## 2025-05-21 DIAGNOSIS — E11.22 TYPE 2 DIABETES MELLITUS WITH STAGE 3A CHRONIC KIDNEY DISEASE, WITHOUT LONG-TERM CURRENT USE OF INSULIN (HCC): ICD-10-CM

## 2025-05-21 DIAGNOSIS — N18.31 TYPE 2 DIABETES MELLITUS WITH STAGE 3A CHRONIC KIDNEY DISEASE, WITHOUT LONG-TERM CURRENT USE OF INSULIN (HCC): ICD-10-CM

## 2025-05-21 DIAGNOSIS — M54.41 ACUTE BILATERAL LOW BACK PAIN WITH BILATERAL SCIATICA: Primary | ICD-10-CM
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
## 2025-05-21 DIAGNOSIS — M54.42 ACUTE BILATERAL LOW BACK PAIN WITH BILATERAL SCIATICA: Primary | ICD-10-CM

## 2025-05-21 LAB
25(OH)D3 SERPL-MCNC: 26.9 NG/ML (ref 30–100)
ANION GAP SERPL CALCULATED.3IONS-SCNC: 9 MMOL/L (ref 4–13)
BUN SERPL-MCNC: 14 MG/DL (ref 5–25)
CALCIUM SERPL-MCNC: 10 MG/DL (ref 8.4–10.2)
CHLORIDE SERPL-SCNC: 103 MMOL/L (ref 96–108)
CO2 SERPL-SCNC: 30 MMOL/L (ref 21–32)
CREAT SERPL-MCNC: 0.87 MG/DL (ref 0.6–1.3)
ERYTHROCYTE [DISTWIDTH] IN BLOOD BY AUTOMATED COUNT: 13.2 % (ref 11.6–15.1)
GFR SERPL CREATININE-BSD FRML MDRD: 68 ML/MIN/1.73SQ M
GLUCOSE P FAST SERPL-MCNC: 89 MG/DL (ref 65–99)
HCT VFR BLD AUTO: 37.4 % (ref 34.8–46.1)
HGB BLD-MCNC: 12.4 G/DL (ref 11.5–15.4)
MCH RBC QN AUTO: 31 PG (ref 26.8–34.3)
MCHC RBC AUTO-ENTMCNC: 33.2 G/DL (ref 31.4–37.4)
MCV RBC AUTO: 94 FL (ref 82–98)
PLATELET # BLD AUTO: 244 THOUSANDS/UL (ref 149–390)
PMV BLD AUTO: 10.8 FL (ref 8.9–12.7)
POTASSIUM SERPL-SCNC: 3.9 MMOL/L (ref 3.5–5.3)
RBC # BLD AUTO: 4 MILLION/UL (ref 3.81–5.12)
SODIUM SERPL-SCNC: 142 MMOL/L (ref 135–147)
WBC # BLD AUTO: 10.49 THOUSAND/UL (ref 4.31–10.16)

## 2025-05-21 PROCEDURE — 80048 BASIC METABOLIC PNL TOTAL CA: CPT

## 2025-05-21 PROCEDURE — 36415 COLL VENOUS BLD VENIPUNCTURE: CPT

## 2025-05-21 PROCEDURE — 85027 COMPLETE CBC AUTOMATED: CPT

## 2025-05-21 PROCEDURE — G2211 COMPLEX E/M VISIT ADD ON: HCPCS

## 2025-05-21 PROCEDURE — 82306 VITAMIN D 25 HYDROXY: CPT

## 2025-05-21 PROCEDURE — 99214 OFFICE O/P EST MOD 30 MIN: CPT

## 2025-05-21 RX ORDER — DOXYCYCLINE HYCLATE 100 MG
100 TABLET ORAL 2 TIMES DAILY
Status: CANCELLED | OUTPATIENT
Start: 2025-05-21

## 2025-05-21 RX ORDER — FUROSEMIDE 20 MG/1
20 TABLET ORAL DAILY
Qty: 100 TABLET | Refills: 1 | Status: SHIPPED | OUTPATIENT
Start: 2025-05-21

## 2025-05-21 RX ORDER — POTASSIUM CHLORIDE 1500 MG/1
20 TABLET, EXTENDED RELEASE ORAL DAILY
Qty: 100 TABLET | Refills: 1 | Status: SHIPPED | OUTPATIENT
Start: 2025-05-21

## 2025-05-21 RX ORDER — FENOFIBRATE 160 MG/1
160 TABLET ORAL DAILY
Qty: 100 TABLET | Refills: 1 | Status: SHIPPED | OUTPATIENT
Start: 2025-05-21 | End: 2025-12-07

## 2025-05-21 RX ORDER — VALSARTAN 320 MG/1
320 TABLET ORAL DAILY
Qty: 100 TABLET | Refills: 1 | Status: SHIPPED | OUTPATIENT
Start: 2025-05-21 | End: 2025-12-07

## 2025-05-21 RX ORDER — DIAZEPAM 5 MG/1
TABLET ORAL
COMMUNITY
Start: 2025-04-13 | End: 2025-05-21

## 2025-05-21 RX ORDER — ATORVASTATIN CALCIUM 20 MG/1
20 TABLET, FILM COATED ORAL DAILY
Qty: 100 TABLET | Refills: 1 | Status: SHIPPED | OUTPATIENT
Start: 2025-05-21

## 2025-05-21 RX ORDER — GABAPENTIN 100 MG/1
100 CAPSULE ORAL 2 TIMES DAILY
Qty: 60 CAPSULE | Refills: 0 | Status: SHIPPED | OUTPATIENT
Start: 2025-05-21

## 2025-05-21 RX ORDER — METOPROLOL SUCCINATE 25 MG/1
25 TABLET, EXTENDED RELEASE ORAL DAILY
Qty: 100 TABLET | Refills: 1 | Status: SHIPPED | OUTPATIENT
Start: 2025-05-21

## 2025-05-21 RX ORDER — DIAZEPAM 5 MG/1
TABLET ORAL
Status: CANCELLED | OUTPATIENT
Start: 2025-05-21

## 2025-05-21 RX ORDER — HYDROCODONE BITARTRATE AND ACETAMINOPHEN 5; 325 MG/1; MG/1
1-2 TABLET ORAL EVERY 6 HOURS PRN
COMMUNITY
Start: 2025-04-12 | End: 2025-05-21

## 2025-05-21 RX ORDER — POTASSIUM CHLORIDE 1500 MG/1
TABLET, EXTENDED RELEASE ORAL
COMMUNITY
Start: 2025-05-08 | End: 2025-05-21 | Stop reason: SDUPTHER

## 2025-05-21 RX ORDER — METHOCARBAMOL 500 MG/1
500 TABLET, FILM COATED ORAL 2 TIMES DAILY PRN
Qty: 60 TABLET | Refills: 0 | Status: SHIPPED | OUTPATIENT
Start: 2025-05-21 | End: 2025-06-20

## 2025-05-21 NOTE — ASSESSMENT & PLAN NOTE
Much improved, her life stressors are less with her sister being placed in a facility for 24/7 care.  She is smoking less.  She is very happy to move into her son's place.  Stable on current dose of zoloft and sleep is improved with occasional trazodone.

## 2025-05-21 NOTE — ASSESSMENT & PLAN NOTE
Lab Results   Component Value Date    HGBA1C 6.1 (H) 04/13/2025   Will get a repeat BMP today, labs are stable/around her baseline.  Continue on valsartan, stay well-hydrated, avoid nephrotoxic medications.    Orders:  •  Basic metabolic panel; Future  •  Vitamin D 25 hydroxy; Future

## 2025-05-21 NOTE — PROGRESS NOTES
Name: Chetna Sandhu      : 1955      MRN: [id number]  Encounter Provider: Magdalena Mckeon PA-C  Encounter Date: 2025   Encounter department: [facility] [place]  :  Assessment & Plan  Acute bilateral low back pain with bilateral sciatica  Patient continues to have pain with numbness down the right leg.  However, will postpone establishing with PT and pain management until she moves to [place] with her son in a month.  She was advised to call around to make appointments now so that she does not have as long of a wait when she moves there.  Orders:  •  gabapentin (NEURONTIN) 100 mg capsule; Take 1 capsule (100 mg total) by mouth in the morning and 1 capsule (100 mg total) before bedtime.  •  methocarbamol (ROBAXIN) 500 mg tablet; Take 1 tablet (500 mg total) by mouth 2 (two) times a day as needed for muscle spasms    Current mild episode of major depressive disorder, unspecified whether recurrent (HCC)  Much improved, her life stressors are less with her sister being placed in a facility for  care.  She is smoking less.  She is very happy to move into her son's place.  Stable on current dose of zoloft and sleep is improved with occasional trazodone.         Type 2 diabetes mellitus with stage 3a chronic kidney disease, without long-term current use of insulin ([facility])    Lab Results   Component Value Date    HGBA1C 6.1 (H) 2025   Will get a repeat BMP today, labs are stable/around her baseline.  Continue on valsartan, stay well-hydrated, avoid nephrotoxic medications.    Orders:  •  Basic metabolic panel; Future  •  Vitamin D 25 hydroxy; Future    Primary hypertension  Initially high, on recheck was improved.  Patient to continue on furosemide and valsartan.  Advised if she does not take her furosemide, she is to hold her potassium.  She will get repeat labs today.  She has reduced her smoking to only a few cigarettes per day at this point.    She is to make an  appointment to establish with a new PCP in [place].  She is advised to call now to make an appointment for about a month after she moves to her sons.  Orders:  •  CBC; Future  •  Basic metabolic panel; Future  •  furosemide (LASIX) 20 mg tablet; Take 1 tablet (20 mg total) by mouth daily  •  metoprolol succinate (TOPROL-XL) 25 mg 24 hr tablet; Take 1 tablet (25 mg total) by mouth daily  •  potassium chloride (Klor-Con M20) 20 mEq tablet; Take 1 tablet (20 mEq total) by mouth daily  •  valsartan (DIOVAN) 320 MG tablet; Take 1 tablet (320 mg total) by mouth daily    Mixed hyperlipidemia  Stable, continue on current medications.  Refills given for 6 months, needs to establish with new PCP in [place].  Orders:  •  atorvastatin (LIPITOR) 20 mg tablet; Take 1 tablet (20 mg total) by mouth daily  •  fenofibrate 160 MG tablet; Take 1 tablet (160 mg total) by mouth daily    Leukocytosis, unspecified type  Likely secondary to steroids while in the hospital.  She has no signs or symptoms of infection on exam.  Will get a repeat CBC today.  Orders:  •  CBC; Future    Residential relocation  Pt plans to move in the next month into her son's home in [place].  She was told not to establish with pain management here in [place] since she is moving.  She also is postponing her physical therapy due to the move.  Advised pt she look now for a PCP, PT, and pain/spine management and make appointments now as it may take a couple months until she can establish.  In the meantime, six months of refills was provided to the pt.  She will have lab work done today and will treat according to the results.                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  Consulted with neurosurgery and ortho.  No surgical intervention was recommended and was referred for OP management with  "Pain, PT/OT.    Her gabapentin was decreased with improvement of her back symptoms due to LHN.  Negative orthostatics.  Had some leukocytosis likely 2/2 steroids.  Recommended CBC and BMP in one week post discharge.  BP was also elevated, but normalized prior to discharge.    She was given referral to Pain management, PT/OT, schedule follow up with NSx, and medications are gabapentin and Robaxin.    PDMP reviewed.  Has scripts for hydrocodone and diazepam she filled a month ago from an ED visit on 4/12/25.  She states she is no longer on these medications.    She states she has continued back pain today, but it is manageable with the gabapentin and robaxin.  Occasionally uses walker, especially if she is feeling fatigued.  She is not taking the narcotics or diazepam.  She tried to make appt with pain mngt, but since she is moving in a month they advised she wait to establish in [place].  Needs refill of medications prior to her move.      Review of Systems   Constitutional: Negative.    Respiratory: Negative.     Cardiovascular: Negative.    Musculoskeletal:  Positive for back pain. Negative for gait problem.   Neurological: Negative.    Hematological:  Negative for adenopathy.   Psychiatric/Behavioral:  Negative for sleep disturbance.    All other systems reviewed and are negative.      Objective   /72 (BP Location: Left arm, Patient Position: Sitting, Cuff Size: Standard)   Pulse 77   Temp (!) 95.6 °F (35.3 °C) (Tympanic)   Resp 18   Ht 5' 1\" (1.549 m)   Wt 70.7 kg (155 lb 12.8 oz)   SpO2 95%   BMI 29.44 kg/m²      Physical Exam  Vitals and nursing note reviewed.   Constitutional:       General: She is not in acute distress.     Appearance: Normal appearance. She is not toxic-appearing.   HENT:      Head: Normocephalic and atraumatic.     Eyes:      General: Lids are normal.       Cardiovascular:      Rate and Rhythm: Normal rate and regular rhythm.      Heart sounds: Normal heart sounds.   Pulmonary:      " Effort: Pulmonary effort is normal. No respiratory distress.      Breath sounds: Normal breath sounds.   Abdominal:      General: Bowel sounds are normal.      Palpations: Abdomen is soft.      Tenderness: There is no abdominal tenderness.     Musculoskeletal:         General: No deformity or signs of injury.      Cervical back: Normal and full passive range of motion without pain.      Thoracic back: No swelling, tenderness or bony tenderness. Decreased range of motion. Scoliosis present.      Lumbar back: Tenderness and bony tenderness present. No swelling or spasms. Decreased range of motion. Positive right straight leg raise test and positive left straight leg raise test. Scoliosis present.      Right hip: Normal.      Left hip: Normal.        Legs:       Comments: Right thigh tingling present, worse with SLR  L posterior leg numbness and pain, sharp, worse with SLR.     Skin:     General: Skin is warm and dry.     Neurological:      Mental Status: She is alert and oriented to person, place, and time.      Gait: Gait is intact.     Psychiatric:         Mood and Affect: Mood normal.         Behavior: Behavior normal.

## 2025-05-21 NOTE — ASSESSMENT & PLAN NOTE
Initially high, on recheck was improved.  Patient to continue on furosemide and valsartan.  Advised if she does not take her furosemide, she is to hold her potassium.  She will get repeat labs today.  She has reduced her smoking to only a few cigarettes per day at this point.    She is to make an appointment to establish with a new PCP in [place].  She is advised to call now to make an appointment for about a month after she moves to her sons.  Orders:  •  CBC; Future  •  Basic metabolic panel; Future  •  furosemide (LASIX) 20 mg tablet; Take 1 tablet (20 mg total) by mouth daily  •  metoprolol succinate (TOPROL-XL) 25 mg 24 hr tablet; Take 1 tablet (25 mg total) by mouth daily  •  potassium chloride (Klor-Con M20) 20 mEq tablet; Take 1 tablet (20 mEq total) by mouth daily  •  valsartan (DIOVAN) 320 MG tablet; Take 1 tablet (320 mg total) by mouth daily

## 2025-05-21 NOTE — ASSESSMENT & PLAN NOTE
Lab Results   Component Value Date    EGFR 66 04/22/2025    EGFR 86 04/20/2025    EGFR 63 04/19/2025    CREATININE 0.93 04/22/2025    CREATININE 0.75 04/20/2025    CREATININE 0.97 04/19/2025     Will get a repeat BMP today, labs are stable/around her baseline.  Continue on valsartan, stay well-hydrated, avoid nephrotoxic medications.  Orders:  •  Basic metabolic panel; Future  •  Vitamin D 25 hydroxy; Future

## 2025-05-21 NOTE — TELEPHONE ENCOUNTER
Pt called and stated that she forgot to ask PCP if she can take the robaxin more than twice a day for her muscle spasms?

## 2025-05-21 NOTE — ASSESSMENT & PLAN NOTE
Stable, continue on current medications.  Refills given for 6 months, needs to establish with new PCP in [place].  Orders:  •  atorvastatin (LIPITOR) 20 mg tablet; Take 1 tablet (20 mg total) by mouth daily  •  fenofibrate 160 MG tablet; Take 1 tablet (160 mg total) by mouth daily

## 2025-05-22 ENCOUNTER — RESULTS FOLLOW-UP (OUTPATIENT)
Age: 70
End: 2025-05-22

## 2025-05-22 NOTE — TELEPHONE ENCOUNTER
Patient called for results, relayed message from April. Patient asking if there is any infection present and about how long it take for her WBC to come down. Patient requests call back to advise.

## 2025-05-23 NOTE — TELEPHONE ENCOUNTER
Patient returned call, relayed PCP message. Patient was very appreciative and did not have further questions.

## 2025-05-23 NOTE — TELEPHONE ENCOUNTER
----- Message from Magdalena Mckeon PA-C sent at 5/22/2025 12:26 PM EDT -----  She does not have an infection.  She does not have fever, chills, cough, swelling or redness that would indicate she has an infection.  This is from the steroids.  It usually has the highest number   around 2 weeks after stopping steroids and takes time to come back down, sometimes up to 5 weeks.  She is barely above the normal range which is 10.16, she is 10.4.    April  ----- Message -----  From: Jade Johnson LPN  Sent: 5/22/2025  11:54 AM EDT  To: Magdalena Mckeon PA-C      ----- Message -----  From: Marissa Bain  Sent: 5/22/2025   8:29 AM EDT  To: [facility]    ----- Message from Marissa Bain sent at 5/22/2025  8:29 AM EDT -----

## 2025-05-28 ENCOUNTER — TELEPHONE (OUTPATIENT)
Age: 70
End: 2025-05-28

## 2025-06-16 ENCOUNTER — TELEPHONE (OUTPATIENT)
Age: 70
End: 2025-06-16

## 2025-06-16 NOTE — TELEPHONE ENCOUNTER
Patient asked to speak with Samreen.  She says that [place] is [place] out [place] and needs a letter stating why she takes methocarbomol.  She asked that Samreen call her back.

## 2025-06-16 NOTE — TELEPHONE ENCOUNTER
Can write letter than she takes methocarbamol due to back pain. Her PCP is currently out on  leave and jennifer is out on medical leave.

## 2025-06-20 DIAGNOSIS — M54.41 ACUTE BILATERAL LOW BACK PAIN WITH BILATERAL SCIATICA: ICD-10-CM

## 2025-06-20 DIAGNOSIS — M54.42 ACUTE BILATERAL LOW BACK PAIN WITH BILATERAL SCIATICA: ICD-10-CM

## 2025-06-20 RX ORDER — GABAPENTIN 100 MG/1
100 CAPSULE ORAL 2 TIMES DAILY
Qty: 180 CAPSULE | Refills: 1 | Status: SHIPPED | OUTPATIENT
Start: 2025-06-20

## 2025-06-20 NOTE — TELEPHONE ENCOUNTER
Patient called to say at her last visit (5/21/25) provider agreed to supply her with 6 months worth of refills until she got situated with a new provider.    Patient is requesting a refill of gabapentin 100 mg with refills to be filled with [id number].    Requested Prescriptions     Pending Prescriptions Disp Refills    gabapentin (NEURONTIN) 100 mg capsule 60 capsule 5     Sig: Take 1 capsule (100 mg total) by mouth in the morning and 1 capsule (100 mg total) before bedtime.

## 2025-08-14 ENCOUNTER — VBI (OUTPATIENT)
Dept: ADMINISTRATIVE | Facility: OTHER | Age: 70
End: 2025-08-14

## (undated) DEVICE — GLOVE SRG BIOGEL ECLIPSE 8

## (undated) DEVICE — PROXIMATE SKIN STAPLERS (35 WIDE) CONTAINS 35 STAINLESS STEEL STAPLES (FIXED HEAD): Brand: PROXIMATE

## (undated) DEVICE — SUT MONOCRYL 4-0 PS-2 27 IN Y426H

## (undated) DEVICE — MEDI-VAC YANK SUCT HNDL W/TPRD BULBOUS TIP: Brand: CARDINAL HEALTH

## (undated) DEVICE — TROCAR: Brand: KII FIOS FIRST ENTRY

## (undated) DEVICE — 3M™ IOBAN™ 2 ANTIMICROBIAL INCISE DRAPE 6650EZ: Brand: IOBAN™ 2

## (undated) DEVICE — PROXIMATE RELOADABLE LINEAR CUTTER WITH SAFETY LOCK-OUT, 75MM: Brand: PROXIMATE

## (undated) DEVICE — ACCESS PLATFORM FOR MINIMALLY INVASIVE SURGERY.: Brand: GELPORT® LAPAROSCOPIC  SYSTEM

## (undated) DEVICE — SUT VICRYL 2-0 D-SPECIAL 27 IN D8114

## (undated) DEVICE — HARMONIC 1100 SHEARS, 36CM SHAFT LENGTH: Brand: HARMONIC

## (undated) DEVICE — IRRIG ENDO FLO TUBING

## (undated) DEVICE — BETHLEHEM MAJOR GENERAL PACK: Brand: CARDINAL HEALTH

## (undated) DEVICE — PLUMEPEN PRO 10FT

## (undated) DEVICE — TUBING SMOKE EVAC W/FILTRATION DEVICE PLUMEPORT ACTIV

## (undated) DEVICE — VISUALIZATION SYSTEM: Brand: CLEARIFY

## (undated) DEVICE — ADHESIVE SKIN HIGH VISCOSITY EXOFIN 1ML

## (undated) DEVICE — PROXIMATE LINEAR CUTTER RELOAD, BLUE, 75MM: Brand: PROXIMATE

## (undated) DEVICE — INTENDED FOR TISSUE SEPARATION, AND OTHER PROCEDURES THAT REQUIRE A SHARP SURGICAL BLADE TO PUNCTURE OR CUT.: Brand: BARD-PARKER SAFETY BLADES SIZE 11, STERILE

## (undated) DEVICE — ENDOPATH PNEUMONEEDLE INSUFFLATION NEEDLES WITH LUER LOCK CONNECTORS 120MM: Brand: ENDOPATH

## (undated) DEVICE — SUT VICRYL 0 UR-6 27 IN J603H

## (undated) DEVICE — INTENDED FOR TISSUE SEPARATION, AND OTHER PROCEDURES THAT REQUIRE A SHARP SURGICAL BLADE TO PUNCTURE OR CUT.: Brand: BARD-PARKER SAFETY BLADES SIZE 10, STERILE

## (undated) DEVICE — INSUFLATION TUBING INSUFLOW (LEXION)

## (undated) DEVICE — SUT SILK 3-0 SH CR/8 18 IN C013D

## (undated) DEVICE — INTENDED FOR TISSUE SEPARATION, AND OTHER PROCEDURES THAT REQUIRE A SHARP SURGICAL BLADE TO PUNCTURE OR CUT.: Brand: BARD-PARKER SAFETY BLADES SIZE 15, STERILE

## (undated) DEVICE — NEEDLE 25G X 1 1/2

## (undated) DEVICE — SUT PDS II 1 CTX 36 IN Z371T

## (undated) DEVICE — 3000CC GUARDIAN II: Brand: GUARDIAN

## (undated) DEVICE — CHLORAPREP HI-LITE 26ML ORANGE

## (undated) DEVICE — GLOVE INDICATOR PI UNDERGLOVE SZ 8 BLUE

## (undated) DEVICE — STERILE EMESIS BASIN                 070: Brand: CARDINAL HEALTH

## (undated) DEVICE — TROCAR: Brand: KII® SLEEVE

## (undated) DEVICE — TRAY FOLEY 16FR URIMETER SURESTEP

## (undated) DEVICE — SUT SILK 2-0 SH CR/8 18 IN C012D

## (undated) DEVICE — SUT SILK 2-0 18 IN A185H